# Patient Record
Sex: MALE | Race: BLACK OR AFRICAN AMERICAN | Employment: UNEMPLOYED | URBAN - METROPOLITAN AREA
[De-identification: names, ages, dates, MRNs, and addresses within clinical notes are randomized per-mention and may not be internally consistent; named-entity substitution may affect disease eponyms.]

---

## 2017-10-20 ENCOUNTER — APPOINTMENT (OUTPATIENT)
Dept: CT IMAGING | Age: 57
End: 2017-10-20
Attending: EMERGENCY MEDICINE
Payer: MEDICARE

## 2017-10-20 ENCOUNTER — HOSPITAL ENCOUNTER (EMERGENCY)
Age: 57
Discharge: HOME | End: 2017-10-20
Attending: EMERGENCY MEDICINE
Payer: MEDICARE

## 2017-10-20 ENCOUNTER — APPOINTMENT (OUTPATIENT)
Dept: GENERAL RADIOLOGY | Age: 57
End: 2017-10-20
Attending: EMERGENCY MEDICINE
Payer: MEDICARE

## 2017-10-20 VITALS
HEIGHT: 71 IN | RESPIRATION RATE: 17 BRPM | SYSTOLIC BLOOD PRESSURE: 168 MMHG | DIASTOLIC BLOOD PRESSURE: 98 MMHG | TEMPERATURE: 98 F | OXYGEN SATURATION: 100 % | WEIGHT: 181 LBS | BODY MASS INDEX: 25.34 KG/M2 | HEART RATE: 71 BPM

## 2017-10-20 DIAGNOSIS — R07.81 PLEURITIC CHEST PAIN: Primary | ICD-10-CM

## 2017-10-20 LAB
ALBUMIN SERPL-MCNC: 3 G/DL (ref 3.4–5)
ALBUMIN/GLOB SERPL: 0.9 {RATIO} (ref 0.8–1.7)
ALP SERPL-CCNC: 81 U/L (ref 45–117)
ALT SERPL-CCNC: 60 U/L (ref 16–61)
ANION GAP SERPL CALC-SCNC: 5 MMOL/L (ref 3–18)
AST SERPL-CCNC: 35 U/L (ref 15–37)
BASOPHILS # BLD: 0 K/UL (ref 0–0.1)
BASOPHILS NFR BLD: 0 % (ref 0–3)
BILIRUB SERPL-MCNC: 0.4 MG/DL (ref 0.2–1)
BUN SERPL-MCNC: 14 MG/DL (ref 7–18)
BUN/CREAT SERPL: 12 (ref 12–20)
CALCIUM SERPL-MCNC: 8.6 MG/DL (ref 8.5–10.1)
CHLORIDE SERPL-SCNC: 105 MMOL/L (ref 100–108)
CK MB CFR SERPL CALC: 0.6 % (ref 0–4)
CK MB SERPL-MCNC: 1 NG/ML (ref 5–25)
CK SERPL-CCNC: 160 U/L (ref 39–308)
CO2 SERPL-SCNC: 31 MMOL/L (ref 21–32)
CREAT SERPL-MCNC: 1.2 MG/DL (ref 0.6–1.3)
D DIMER PPP FEU-MCNC: 0.69 UG/ML(FEU)
DIFFERENTIAL METHOD BLD: ABNORMAL
EOSINOPHIL # BLD: 0.3 K/UL (ref 0–0.4)
EOSINOPHIL NFR BLD: 4 % (ref 0–5)
ERYTHROCYTE [DISTWIDTH] IN BLOOD BY AUTOMATED COUNT: 15.8 % (ref 11.6–14.5)
GLOBULIN SER CALC-MCNC: 3.2 G/DL (ref 2–4)
GLUCOSE SERPL-MCNC: 103 MG/DL (ref 74–99)
HCT VFR BLD AUTO: 38.4 % (ref 36–48)
HGB BLD-MCNC: 12.8 G/DL (ref 13–16)
INR PPP: 1 (ref 0.8–1.2)
LIPASE SERPL-CCNC: 108 U/L (ref 73–393)
LYMPHOCYTES # BLD: 3.5 K/UL (ref 0.8–3.5)
LYMPHOCYTES NFR BLD: 45 % (ref 20–51)
MAGNESIUM SERPL-MCNC: 1.9 MG/DL (ref 1.6–2.6)
MCH RBC QN AUTO: 24.7 PG (ref 24–34)
MCHC RBC AUTO-ENTMCNC: 33.3 G/DL (ref 31–37)
MCV RBC AUTO: 74.1 FL (ref 74–97)
MONOCYTES # BLD: 0.6 K/UL (ref 0–1)
MONOCYTES NFR BLD: 8 % (ref 2–9)
NEUTS SEG # BLD: 3.3 K/UL (ref 1.8–8)
NEUTS SEG NFR BLD: 43 % (ref 42–75)
PLATELET # BLD AUTO: 201 K/UL (ref 135–420)
PMV BLD AUTO: 10.4 FL (ref 9.2–11.8)
POTASSIUM SERPL-SCNC: 4.1 MMOL/L (ref 3.5–5.5)
PROT SERPL-MCNC: 6.2 G/DL (ref 6.4–8.2)
PROTHROMBIN TIME: 12.4 SEC (ref 11.5–15.2)
RBC # BLD AUTO: 5.18 M/UL (ref 4.7–5.5)
RBC MORPH BLD: ABNORMAL
SODIUM SERPL-SCNC: 141 MMOL/L (ref 136–145)
TROPONIN I SERPL-MCNC: <0.02 NG/ML (ref 0–0.06)
WBC # BLD AUTO: 7.7 K/UL (ref 4.6–13.2)
WBC MORPH BLD: ABNORMAL

## 2017-10-20 PROCEDURE — 80053 COMPREHEN METABOLIC PANEL: CPT | Performed by: EMERGENCY MEDICINE

## 2017-10-20 PROCEDURE — 83735 ASSAY OF MAGNESIUM: CPT | Performed by: EMERGENCY MEDICINE

## 2017-10-20 PROCEDURE — 71010 XR CHEST SNGL V: CPT

## 2017-10-20 PROCEDURE — 93005 ELECTROCARDIOGRAM TRACING: CPT

## 2017-10-20 PROCEDURE — 85025 COMPLETE CBC W/AUTO DIFF WBC: CPT | Performed by: EMERGENCY MEDICINE

## 2017-10-20 PROCEDURE — 85610 PROTHROMBIN TIME: CPT | Performed by: EMERGENCY MEDICINE

## 2017-10-20 PROCEDURE — 99285 EMERGENCY DEPT VISIT HI MDM: CPT

## 2017-10-20 PROCEDURE — 93971 EXTREMITY STUDY: CPT

## 2017-10-20 PROCEDURE — 74011250636 HC RX REV CODE- 250/636: Performed by: EMERGENCY MEDICINE

## 2017-10-20 PROCEDURE — 71275 CT ANGIOGRAPHY CHEST: CPT

## 2017-10-20 PROCEDURE — 94762 N-INVAS EAR/PLS OXIMTRY CONT: CPT

## 2017-10-20 PROCEDURE — 85379 FIBRIN DEGRADATION QUANT: CPT | Performed by: EMERGENCY MEDICINE

## 2017-10-20 PROCEDURE — 83690 ASSAY OF LIPASE: CPT | Performed by: EMERGENCY MEDICINE

## 2017-10-20 PROCEDURE — 82550 ASSAY OF CK (CPK): CPT | Performed by: EMERGENCY MEDICINE

## 2017-10-20 PROCEDURE — 74011636320 HC RX REV CODE- 636/320: Performed by: EMERGENCY MEDICINE

## 2017-10-20 PROCEDURE — 96374 THER/PROPH/DIAG INJ IV PUSH: CPT

## 2017-10-20 RX ORDER — HYDROCODONE BITARTRATE AND ACETAMINOPHEN 5; 325 MG/1; MG/1
TABLET ORAL
Qty: 12 TAB | Refills: 0 | Status: SHIPPED | OUTPATIENT
Start: 2017-10-20 | End: 2018-05-29

## 2017-10-20 RX ORDER — NITROGLYCERIN 0.4 MG/1
0.4 TABLET SUBLINGUAL
COMMUNITY

## 2017-10-20 RX ORDER — LISINOPRIL 5 MG/1
5 TABLET ORAL DAILY
COMMUNITY

## 2017-10-20 RX ORDER — ASPIRIN 81 MG/1
81 TABLET ORAL DAILY
COMMUNITY
End: 2018-05-29

## 2017-10-20 RX ORDER — MORPHINE SULFATE 2 MG/ML
4 INJECTION, SOLUTION INTRAMUSCULAR; INTRAVENOUS
Status: COMPLETED | OUTPATIENT
Start: 2017-10-20 | End: 2017-10-20

## 2017-10-20 RX ORDER — RANITIDINE 300 MG/1
300 TABLET ORAL DAILY
COMMUNITY

## 2017-10-20 RX ORDER — CLOPIDOGREL BISULFATE 75 MG/1
75 TABLET ORAL
COMMUNITY

## 2017-10-20 RX ORDER — ATORVASTATIN CALCIUM 20 MG/1
20 TABLET, FILM COATED ORAL
COMMUNITY

## 2017-10-20 RX ORDER — OXYCODONE AND ACETAMINOPHEN 5; 325 MG/1; MG/1
1 TABLET ORAL
COMMUNITY
End: 2017-10-20

## 2017-10-20 RX ADMIN — IOPAMIDOL 100 ML: 755 INJECTION, SOLUTION INTRAVENOUS at 11:51

## 2017-10-20 RX ADMIN — MORPHINE SULFATE 4 MG: 2 INJECTION, SOLUTION INTRAMUSCULAR; INTRAVENOUS at 10:14

## 2017-10-20 NOTE — PROCEDURES
[facility]  *** FINAL REPORT ***    Name: Shine Alanis  MRN: [id number]    Outpatient  : 20 Mar 1960  HIS Order #: [id number]  [address] [facility] [place] #: [id number]  Date: 20 Oct 2017    TYPE OF TEST: Peripheral Venous Testing    REASON FOR TEST  Pain in limb    Right Leg:-  Deep venous thrombosis:           No  Superficial venous thrombosis:    No  Deep venous insufficiency:        Not examined  Superficial venous insufficiency: Not examined      INTERPRETATION/FINDINGS  Duplex images were obtained using 2-D gray scale, color flow, and  spectral Doppler analysis. Right leg :  1. Deep vein(s) visualized include the common femoral, deep femoral,  proximal femoral, mid femoral, distal femoral, popliteal(above knee),  popliteal(fossa), popliteal(below knee), posterior tibial and peroneal   veins. 2. No evidence of deep venous thrombosis detected in the veins  visualized. 3. No evidence of deep vein thrombosis in the contralateral common  femoral vein. 4. Superficial vein(s) visualized include the great saphenous vein. 5. No evidence of superficial thrombosis detected. ADDITIONAL COMMENTS    I have personally reviewed the data relevant to the interpretation of  this  study.     TECHNOLOGIST: Luz Lopez  Signed: 10/20/2017 11:20 AM    PHYSICIAN: Colton Bolton MD  Signed: 10/20/2017 03:05 PM

## 2017-10-20 NOTE — ED PROVIDER NOTES
[address]  EMERGENCY DEPARTMENT HISTORY AND PHYSICAL EXAM       Date: 10/20/2017   Patient Name: Roland Otoole   YOB: 1960  Medical Record Number: [id number]    History of Presenting Illness     Chief Complaint   Patient presents with    Chest Pain        History Provided By:  patient    Additional History: 9:43 AM  Roland Otoole is a 65 y.o. male who presents to the emergency department via EMS C/O CP onset last night at 5 PM worsening with deep inspiration. Right foot swelling and pain. Pt had stent placement 1 week ago after an episode of chest pain. Pt tried Plavix and 1 baby ASA prior to EMS arrival. EMS gave 324 mg ASA. Pt is a former smoker who quit last week. No PMHx of DM. PMHx of HLD, HTN, and GERD. Followed by Cardiology Dr. Yi Mckeon. Pt denies N/V and any other associated signs or sx. Primary Care Provider: Alyse Syed MD   Specialist:    Past History     Past Medical History:   Past Medical History:   Diagnosis Date    Acid reflux     High cholesterol     Hypertension         Past Surgical History:   Past Surgical History:   Procedure Laterality Date    HX BACK SURGERY      HX CORONARY STENT PLACEMENT      HX OTHER SURGICAL Right     Knee surgery        Family History:   History reviewed. No pertinent family history. Social History:   Social History   Substance Use Topics    Smoking status: Current Some Day Smoker     Years: 35.00    Smokeless tobacco: Never Used      Comment: Pt reports he stopped smoking on 10/13/17 with coronary stent placement.  Alcohol use Yes      Comment: Occasional        Allergies:   No Known Allergies     Review of Systems   Review of Systems   Cardiovascular: Positive for chest pain and leg swelling. Gastrointestinal: Positive for abdominal pain. Negative for nausea and vomiting. Musculoskeletal:        (+) Foot swelling   All other systems reviewed and are negative.       Physical Exam  Vitals: 10/20/17 1000 10/20/17 1030 10/20/17 1130 10/20/17 1238   BP: (!) 156/94 (!) 143/94 (!) 147/97 (!) 168/98   Pulse: 70 65 63 71   Resp: 22 17 19 17   Temp:       SpO2: 98% 100% 96% 100%   Weight:       Height:           Physical Exam   Nursing note and vitals reviewed. Constitutional: Well appearing, no acute distress  Head: Normocephalic, Atraumatic  Neck: Supple  Cardiovascular: Regular rate and rhythm, no murmurs, rubs, or gallops  Chest: Normal work of breathing and chest excursion bilaterally  Lungs: Clear to ausculation bilaterally  Abdomen: Soft, non tender, non distended, normoactive bowel sounds  Back: No evidence of trauma or deformity  Extremities: No evidence of trauma or deformity, Mild RLE edema. Skin: Warm and dry  Neuro: Alert and appropriate  Psychiatric: Normal mood and affect       Diagnostic Study Results     Labs -      Recent Results (from the past 12 hour(s))   EKG, 12 LEAD, INITIAL    Collection Time: 10/20/17  9:49 AM   Result Value Ref Range    Ventricular Rate 68 BPM    Atrial Rate 68 BPM    P-R Interval 154 ms    QRS Duration 84 ms    Q-T Interval 408 ms    QTC Calculation (Bezet) 433 ms    Calculated P Axis 51 degrees    Calculated T Axis 10 degrees    Diagnosis       Normal sinus rhythm  Normal ECG  No previous ECGs available     CBC WITH AUTOMATED DIFF    Collection Time: 10/20/17  9:52 AM   Result Value Ref Range    WBC 7.7 4.6 - 13.2 K/uL    RBC 5.18 4.70 - 5.50 M/uL    HGB 12.8 (L) 13.0 - 16.0 g/dL    HCT 38.4 36.0 - 48.0 %    MCV 74.1 74.0 - 97.0 FL    MCH 24.7 24.0 - 34.0 PG    MCHC 33.3 31.0 - 37.0 g/dL    RDW 15.8 (H) 11.6 - 14.5 %    PLATELET 385 220 - 226 K/uL    MPV 10.4 9.2 - 11.8 FL    NEUTROPHILS 43 42 - 75 %    LYMPHOCYTES 45 20 - 51 %    MONOCYTES 8 2 - 9 %    EOSINOPHILS 4 0 - 5 %    BASOPHILS 0 0 - 3 %    ABS. NEUTROPHILS 3.3 1.8 - 8.0 K/UL    ABS. LYMPHOCYTES 3.5 0.8 - 3.5 K/UL    ABS. MONOCYTES 0.6 0 - 1.0 K/UL    ABS. EOSINOPHILS 0.3 0.0 - 0.4 K/UL    ABS. BASOPHILS 0.0 0.0 - 0.1 K/UL    RBC COMMENTS ANISOCYTOSIS  1+        RBC COMMENTS OVALOCYTES  1+        RBC COMMENTS SPHEROCYTES  1+        RBC COMMENTS TEARDROP CELLS  1+        RBC COMMENTS RBC FRAGMENTS  1+        WBC COMMENTS ATYPICAL LYMPHOCYTES PRESENT      DF MANUAL     D DIMER    Collection Time: 10/20/17  9:52 AM   Result Value Ref Range    D DIMER 0.69 (H) <0.46 ug/ml(FEU)   PROTHROMBIN TIME + INR    Collection Time: 10/20/17  9:52 AM   Result Value Ref Range    Prothrombin time 12.4 11.5 - 15.2 sec    INR 1.0 0.8 - 1.2     METABOLIC PANEL, COMPREHENSIVE    Collection Time: 10/20/17  9:52 AM   Result Value Ref Range    Sodium 141 136 - 145 mmol/L    Potassium 4.1 3.5 - 5.5 mmol/L    Chloride 105 100 - 108 mmol/L    CO2 31 21 - 32 mmol/L    Anion gap 5 3.0 - 18 mmol/L    Glucose 103 (H) 74 - 99 mg/dL    BUN 14 7.0 - 18 MG/DL    Creatinine 1.20 0.6 - 1.3 MG/DL    BUN/Creatinine ratio 12 12 - 20      GFR est AA >60 >60 ml/min/1.73m2    GFR est non-AA >60 >60 ml/min/1.73m2    Calcium 8.6 8.5 - 10.1 MG/DL    Bilirubin, total 0.4 0.2 - 1.0 MG/DL    ALT (SGPT) 60 16 - 61 U/L    AST (SGOT) 35 15 - 37 U/L    Alk. phosphatase 81 45 - 117 U/L    Protein, total 6.2 (L) 6.4 - 8.2 g/dL    Albumin 3.0 (L) 3.4 - 5.0 g/dL    Globulin 3.2 2.0 - 4.0 g/dL    A-G Ratio 0.9 0.8 - 1.7     MAGNESIUM    Collection Time: 10/20/17  9:52 AM   Result Value Ref Range    Magnesium 1.9 1.6 - 2.6 mg/dL   LIPASE    Collection Time: 10/20/17  9:52 AM   Result Value Ref Range    Lipase 108 73 - 393 U/L   CARDIAC PANEL,(CK, CKMB & TROPONIN)    Collection Time: 10/20/17  9:52 AM   Result Value Ref Range     39 - 308 U/L    CK - MB 1.0 <3.6 ng/ml    CK-MB Index 0.6 0.0 - 4.0 %    Troponin-I, Qt. <0.02 0.00 - 0.06 NG/ML       Radiologic Studies -  The following have been ordered and reviewed:  CTA CHEST W OR W WO CONT   Final Result   Impression:      1. No CTPA evidence of acute pulmonary thromboembolism.        2.  Mild bibasilar partial atelectasis. Anomalous draining left superior  pulmonary vein. Punctate indeterminate nodule apical segment right upper lobe;  statistically likely benign but if risk factors for lung cancer such as smoking  or significant exposure present, follow-up noncontrast chest CT in 12 months is  recommended. As read by the radiologist.    XR CHEST SNGL V   Final Result   IMPRESSION:        1. No acute cardiopulmonary disease. As read by the radiologist.    Barabara Nae LOWER EXT VENOUS RIGHT   INTERPRETATION/FINDINGS  Duplex images were obtained using 2-D gray scale, color flow, and  spectral Doppler analysis. Right leg :  1. Deep vein(s) visualized include the common femoral, deep femoral,  proximal femoral, mid femoral, distal femoral, popliteal(above knee),  popliteal(fossa), popliteal(below knee), posterior tibial and peroneal   veins. 2. No evidence of deep venous thrombosis detected in the veins  visualized. 3. No evidence of deep vein thrombosis in the contralateral common  femoral vein. 4. Superficial vein(s) visualized include the great saphenous vein. 5. No evidence of superficial thrombosis detected.     ADDITIONAL COMMENTS     I have personally reviewed the data relevant to the interpretation of  this study.     TECHNOLOGIST: Gerardo Mclaughlin  Signed: 10/20/2017 11:20 AM     As read by technologist       .    Medical Decision Making   I am the first provider for this patient. I reviewed the vital signs, available nursing notes, past medical history, past surgical history, family history and social history. Vital Signs-Reviewed the patient's vital signs. Patient Vitals for the past 12 hrs:   Temp Pulse Resp BP SpO2   10/20/17 1238 - 71 17 (!) 168/98 100 %   10/20/17 1130 - 63 19 (!) 147/97 96 %   10/20/17 1030 - 65 17 (!) 143/94 100 %   10/20/17 1000 - 70 22 (!) 156/94 98 %   10/20/17 0949 98 °F (36.7 °C) 64 18 (!) 150/109 100 %       Pulse Oximetry Analysis - Normal 100% on room air.      Cardiac Monitor:   Rate: 68 bpm  Rhythm: Normal Sinus Rhythm     EKG interpretation: (Subsequent EMS)  Rhythm: NSR. Rate (approx.): 71 bpm; QRS duration at 0.090 s. EKG read by Laron Duncan MD at 9:37 AM    EKG interpretation: (Preliminary)  NSR at 68 bpm. QRS duration at 84 ms. EKG read by Laron Duncan MD at 9:51 AM    Old Medical Records: Nursing notes. Provider Notes:      Procedures:   Procedures    ED Course:  9:43 AM  Initial assessment performed. The patients presenting problems have been discussed, and they are in agreement with the care plan formulated and outlined with them. I have encouraged them to ask questions as they arise throughout their visit. Medications Given in the ED:  Medications   morphine injection 4 mg (4 mg IntraVENous Given 10/20/17 1014)   iopamidol (ISOVUE-370) 76 % injection 100 mL (100 mL IntraVENous Given 10/20/17 1151)       Discharge Note:  12:35 PM  Pt has been reexamined. Patient has no new complaints, changes, or physical findings. Care plan outlined and precautions discussed. Results were reviewed with the patient. All medications were reviewed with the patient; will d/c home with Hydrocodone Acetaminophen. All of pt's questions and concerns were addressed. Patient was instructed and agrees to follow up with Cardiology, as well as to return to the ED upon further deterioration. Patient is ready to go home. Critical Care Time:       Diagnosis   Clinical Impression:   1. Pleuritic chest pain         Discussion:  65year old male presents with pleuritic CP since last night with recent stent. Trop negative. D Dimer postive, CTA negative. Discussed with cardio, Dr. Amanda Morelos, who recommends d/c with f/u in his office. Pt understands and agrees with this plan.       Follow-up Information     Follow up With Details Comments Contact Info    Randolph Todd MD Schedule an appointment as soon as possible for a visit For Cardiology Follow Up [place] [address] [facility]  [phone]      Saul Barber MD Schedule an appointment as soon as possible for a visit For Primary Care Follow Up [address]  Bettye Woodson and Amena PETIT. [phone] [facility]      [facility] EMERGENCY DEPT Go to As needed, If symptoms worsen [address] [place] Arellano  [phone]          Discharge Medication List as of 10/20/2017 12:36 PM      CONTINUE these medications which have NOT CHANGED    Details   clopidogrel (PLAVIX) 75 mg tab Take 75 mg by mouth., Historical Med      aspirin delayed-release 81 mg tablet Take 81 mg by mouth daily. , Historical Med      atorvastatin (LIPITOR) 20 mg tablet Take  by mouth daily. , Historical Med      lisinopril (PRINIVIL, ZESTRIL) 5 mg tablet Take 5 mg by mouth daily. , Historical Med      nitroglycerin (NITROSTAT) 0.4 mg SL tablet 0.4 mg by SubLINGual route every five (5) minutes as needed for Chest Pain., Historical Med      raNITIdine (ZANTAC) 300 mg tablet Take 300 mg by mouth daily. , Historical Med      oxyCODONE-acetaminophen (PERCOCET) 5-325 mg per tablet Take 1 Tab by mouth every four (4) hours as needed for Pain., Historical Med             _______________________________   Attestations: This note is prepared by Gerhard Kang, acting as a Scribe for Agusto Pritchett MD on 9:51 AM on 10/20/2017. Agusto Pritchett MD: The scribe's documentation has been prepared under my direction and personally reviewed by me in its entirety.   _______________________________

## 2017-10-20 NOTE — ED NOTES
Pt medicated for pain. Wife remains at bedside. Pt calm and cooperative. CCM showing NSR. Call light within reach. Warm blankets provided.

## 2017-10-20 NOTE — DISCHARGE INSTRUCTIONS
Chest Pain: Care Instructions  Your Care Instructions  There are many things that can cause chest pain. Some are not serious and will get better on their own in a few days. But some kinds of chest pain need more testing and treatment. Your doctor may have recommended a follow-up visit in the next 8 to 12 hours. If you are not getting better, you may need more tests or treatment. Even though your doctor has released you, you still need to watch for any problems. The doctor carefully checked you, but sometimes problems can develop later. If you have new symptoms or if your symptoms do not get better, get medical care right away. If you have worse or different chest pain or pressure that lasts more than 5 minutes or you passed out (lost consciousness), call 911 or seek other emergency help right away. A medical visit is only one step in your treatment. Even if you feel better, you still need to do what your doctor recommends, such as going to all suggested follow-up appointments and taking medicines exactly as directed. This will help you recover and help prevent future problems. How can you care for yourself at home? · Rest until you feel better. · Take your medicine exactly as prescribed. Call your doctor if you think you are having a problem with your medicine. · Do not drive after taking a prescription pain medicine. When should you call for help? Call 911 if:  · You passed out (lost consciousness). · You have severe difficulty breathing. · You have symptoms of a heart attack. These may include:  ¨ Chest pain or pressure, or a strange feeling in your chest.  ¨ Sweating. ¨ Shortness of breath. ¨ Nausea or vomiting. ¨ Pain, pressure, or a strange feeling in your back, neck, jaw, or upper belly or in one or both shoulders or arms. ¨ Lightheadedness or sudden weakness. ¨ A fast or irregular heartbeat.   After you call 911, the  may tell you to chew 1 adult-strength or 2 to 4 low-dose aspirin. Wait for an ambulance. Do not try to drive yourself. Call your doctor today if:  · You have any trouble breathing. · Your chest pain gets worse. · You are dizzy or lightheaded, or you feel like you may faint. · You are not getting better as expected. · You are having new or different chest pain. Where can you learn more? Go to http://nathaly-meghana.info/. Enter A120 in the search box to learn more about \"Chest Pain: Care Instructions. \"  Current as of: March 20, 2017  Content Version: 11.3  © 7899-3329 [employer]. Care instructions adapted under license by [employer] (which disclaims liability or warranty for this information). If you have questions about a medical condition or this instruction, always ask your healthcare professional. [address] any warranty or liability for your use of this information.

## 2017-10-20 NOTE — ED TRIAGE NOTES
Pt brought by EMS for chest pain, 9/10, onset 10/19/17 at 1700. Pt reports he had a stent placed at [facility] on Friday. Pt reports the chest pain increases with deep breathing. Sepsis Screening completed    (  )Patient meets SIRS criteria. (X) Patient does not meet SIRS criteria.       SIRS Criteria is achieved when two or more of the following are present   Temperature < 96.8°F (36°C) or > 100.9°F (38.3°C)   Heart Rate > 90 beats per minute   Respiratory Rate > 20 breaths per minute   WBC count > 12,000 or <4,000 or > 10% bands

## 2017-10-26 LAB
ATRIAL RATE: 68 BPM
CALCULATED P AXIS, ECG09: 51 DEGREES
CALCULATED T AXIS, ECG11: 10 DEGREES
DIAGNOSIS, 93000: NORMAL
P-R INTERVAL, ECG05: 154 MS
Q-T INTERVAL, ECG07: 408 MS
QRS DURATION, ECG06: 84 MS
QTC CALCULATION (BEZET), ECG08: 433 MS
VENTRICULAR RATE, ECG03: 68 BPM

## 2018-05-29 ENCOUNTER — HOSPITAL ENCOUNTER (OUTPATIENT)
Dept: PREADMISSION TESTING | Age: 58
Discharge: HOME | End: 2018-05-29
Payer: MEDICARE

## 2018-05-29 VITALS — HEIGHT: 71 IN | BODY MASS INDEX: 30.8 KG/M2 | WEIGHT: 220 LBS

## 2018-05-29 LAB
ALBUMIN SERPL-MCNC: 3.6 G/DL (ref 3.4–5)
ALBUMIN/GLOB SERPL: 1.1 {RATIO} (ref 0.8–1.7)
ALP SERPL-CCNC: 101 U/L (ref 45–117)
ALT SERPL-CCNC: 38 U/L (ref 16–61)
ANION GAP SERPL CALC-SCNC: 8 MMOL/L (ref 3–18)
APPEARANCE UR: CLEAR
AST SERPL-CCNC: 20 U/L (ref 15–37)
BACTERIA SPEC CULT: NORMAL
BILIRUB SERPL-MCNC: 0.5 MG/DL (ref 0.2–1)
BILIRUB UR QL: NEGATIVE
BUN SERPL-MCNC: 14 MG/DL (ref 7–18)
BUN/CREAT SERPL: 13 (ref 12–20)
CALCIUM SERPL-MCNC: 8.8 MG/DL (ref 8.5–10.1)
CHLORIDE SERPL-SCNC: 105 MMOL/L (ref 100–108)
CO2 SERPL-SCNC: 28 MMOL/L (ref 21–32)
COLOR UR: YELLOW
CREAT SERPL-MCNC: 1.1 MG/DL (ref 0.6–1.3)
ERYTHROCYTE [DISTWIDTH] IN BLOOD BY AUTOMATED COUNT: 16.5 % (ref 11.6–14.5)
GLOBULIN SER CALC-MCNC: 3.4 G/DL (ref 2–4)
GLUCOSE SERPL-MCNC: 86 MG/DL (ref 74–99)
GLUCOSE UR STRIP.AUTO-MCNC: NEGATIVE MG/DL
HCT VFR BLD AUTO: 39.6 % (ref 36–48)
HGB BLD-MCNC: 12.5 G/DL (ref 13–16)
HGB UR QL STRIP: NEGATIVE
KETONES UR QL STRIP.AUTO: NEGATIVE MG/DL
LEUKOCYTE ESTERASE UR QL STRIP.AUTO: NEGATIVE
MCH RBC QN AUTO: 23.9 PG (ref 24–34)
MCHC RBC AUTO-ENTMCNC: 31.6 G/DL (ref 31–37)
MCV RBC AUTO: 75.9 FL (ref 74–97)
NITRITE UR QL STRIP.AUTO: NEGATIVE
PH UR STRIP: 5 [PH] (ref 5–8)
PLATELET # BLD AUTO: 216 K/UL (ref 135–420)
POTASSIUM SERPL-SCNC: 4.2 MMOL/L (ref 3.5–5.5)
PROT SERPL-MCNC: 7 G/DL (ref 6.4–8.2)
PROT UR STRIP-MCNC: NEGATIVE MG/DL
RBC # BLD AUTO: 5.22 M/UL (ref 4.7–5.5)
SERVICE CMNT-IMP: NORMAL
SODIUM SERPL-SCNC: 141 MMOL/L (ref 136–145)
SP GR UR REFRACTOMETRY: 1.02 (ref 1–1.03)
UROBILINOGEN UR QL STRIP.AUTO: 0.2 EU/DL (ref 0.2–1)
WBC # BLD AUTO: 6.2 K/UL (ref 4.6–13.2)

## 2018-05-29 PROCEDURE — 80053 COMPREHEN METABOLIC PANEL: CPT | Performed by: ORTHOPAEDIC SURGERY

## 2018-05-29 PROCEDURE — 87086 URINE CULTURE/COLONY COUNT: CPT | Performed by: ORTHOPAEDIC SURGERY

## 2018-05-29 PROCEDURE — 85027 COMPLETE CBC AUTOMATED: CPT | Performed by: ORTHOPAEDIC SURGERY

## 2018-05-29 PROCEDURE — 81003 URINALYSIS AUTO W/O SCOPE: CPT | Performed by: ORTHOPAEDIC SURGERY

## 2018-05-29 PROCEDURE — 87641 MR-STAPH DNA AMP PROBE: CPT | Performed by: ORTHOPAEDIC SURGERY

## 2018-05-29 PROCEDURE — 36415 COLL VENOUS BLD VENIPUNCTURE: CPT | Performed by: ORTHOPAEDIC SURGERY

## 2018-05-29 RX ORDER — CEFAZOLIN SODIUM/WATER 2 G/20 ML
2 SYRINGE (ML) INTRAVENOUS ONCE
Status: DISCONTINUED | OUTPATIENT
Start: 2018-05-29 | End: 2018-05-29 | Stop reason: SDUPTHER

## 2018-05-29 RX ORDER — CALCIUM POLYCARBOPHIL 625 MG
625 TABLET ORAL DAILY
COMMUNITY

## 2018-05-29 RX ORDER — ACETAMINOPHEN 325 MG/1
TABLET ORAL
Status: ON HOLD | COMMUNITY
End: 2018-07-27

## 2018-05-29 RX ORDER — CLONAZEPAM 0.5 MG/1
0.5 TABLET ORAL
COMMUNITY

## 2018-05-29 RX ORDER — CEFAZOLIN SODIUM/WATER 2 G/20 ML
2 SYRINGE (ML) INTRAVENOUS ONCE
Status: CANCELLED | OUTPATIENT
Start: 2018-05-29 | End: 2018-05-29

## 2018-05-29 RX ORDER — SERTRALINE HYDROCHLORIDE 25 MG/1
25 TABLET, FILM COATED ORAL DAILY
Status: ON HOLD | COMMUNITY
End: 2018-07-02

## 2018-05-29 RX ORDER — SODIUM CHLORIDE, SODIUM LACTATE, POTASSIUM CHLORIDE, CALCIUM CHLORIDE 600; 310; 30; 20 MG/100ML; MG/100ML; MG/100ML; MG/100ML
125 INJECTION, SOLUTION INTRAVENOUS CONTINUOUS
Status: DISCONTINUED | OUTPATIENT
Start: 2018-05-29 | End: 2018-05-29 | Stop reason: SDUPTHER

## 2018-05-29 RX ORDER — SODIUM CHLORIDE, SODIUM LACTATE, POTASSIUM CHLORIDE, CALCIUM CHLORIDE 600; 310; 30; 20 MG/100ML; MG/100ML; MG/100ML; MG/100ML
125 INJECTION, SOLUTION INTRAVENOUS CONTINUOUS
Status: CANCELLED | OUTPATIENT
Start: 2018-05-29

## 2018-05-31 LAB
BACTERIA SPEC CULT: NORMAL
SERVICE CMNT-IMP: NORMAL

## 2018-06-25 ENCOUNTER — HOSPITAL ENCOUNTER (INPATIENT)
Age: 58
LOS: 2 days | Discharge: HOME HEALTH | DRG: 470 | End: 2018-06-27
Attending: ORTHOPAEDIC SURGERY | Admitting: ORTHOPAEDIC SURGERY
Payer: MEDICARE

## 2018-06-25 ENCOUNTER — ANESTHESIA (OUTPATIENT)
Dept: SURGERY | Age: 58
DRG: 470 | End: 2018-06-25
Payer: MEDICARE

## 2018-06-25 ENCOUNTER — ANESTHESIA EVENT (OUTPATIENT)
Dept: SURGERY | Age: 58
DRG: 470 | End: 2018-06-25
Payer: MEDICARE

## 2018-06-25 DIAGNOSIS — M17.0 PRIMARY OSTEOARTHRITIS OF BOTH KNEES: Primary | ICD-10-CM

## 2018-06-25 PROBLEM — M17.9 DJD (DEGENERATIVE JOINT DISEASE) OF KNEE: Status: ACTIVE | Noted: 2018-06-25

## 2018-06-25 LAB
ABO + RH BLD: NORMAL
ATRIAL RATE: 67 BPM
BLOOD GROUP ANTIBODIES SERPL: NORMAL
CALCULATED P AXIS, ECG09: 43 DEGREES
CALCULATED R AXIS, ECG10: -8 DEGREES
CALCULATED T AXIS, ECG11: 5 DEGREES
DIAGNOSIS, 93000: NORMAL
P-R INTERVAL, ECG05: 170 MS
Q-T INTERVAL, ECG07: 402 MS
QRS DURATION, ECG06: 84 MS
QTC CALCULATION (BEZET), ECG08: 424 MS
SPECIMEN EXP DATE BLD: NORMAL
VENTRICULAR RATE, ECG03: 67 BPM

## 2018-06-25 PROCEDURE — 74011250636 HC RX REV CODE- 250/636: Performed by: ORTHOPAEDIC SURGERY

## 2018-06-25 PROCEDURE — 77030005521 HC CATH URETH FOL38 BARD -B: Performed by: ORTHOPAEDIC SURGERY

## 2018-06-25 PROCEDURE — 97161 PT EVAL LOW COMPLEX 20 MIN: CPT

## 2018-06-25 PROCEDURE — 77030012508 HC MSK AIRWY LMA AMBU -A: Performed by: ANESTHESIOLOGY

## 2018-06-25 PROCEDURE — 77010033678 HC OXYGEN DAILY

## 2018-06-25 PROCEDURE — 76060000034 HC ANESTHESIA 1.5 TO 2 HR: Performed by: ORTHOPAEDIC SURGERY

## 2018-06-25 PROCEDURE — 77030020813 HC INST SCULP CEM KT DISP S&N -B: Performed by: ORTHOPAEDIC SURGERY

## 2018-06-25 PROCEDURE — C1713 ANCHOR/SCREW BN/BN,TIS/BN: HCPCS | Performed by: ORTHOPAEDIC SURGERY

## 2018-06-25 PROCEDURE — 77030011640 HC PAD GRND REM COVD -A: Performed by: ORTHOPAEDIC SURGERY

## 2018-06-25 PROCEDURE — 77030012893

## 2018-06-25 PROCEDURE — C9290 INJ, BUPIVACAINE LIPOSOME: HCPCS | Performed by: ORTHOPAEDIC SURGERY

## 2018-06-25 PROCEDURE — 77030039267 HC ADH SKN EXOFIN S2SG -B: Performed by: ORTHOPAEDIC SURGERY

## 2018-06-25 PROCEDURE — 77030002966 HC SUT PDS J&J -A: Performed by: ORTHOPAEDIC SURGERY

## 2018-06-25 PROCEDURE — 74011250636 HC RX REV CODE- 250/636

## 2018-06-25 PROCEDURE — 77030027138 HC INCENT SPIROMETER -A

## 2018-06-25 PROCEDURE — 77030011628: Performed by: ORTHOPAEDIC SURGERY

## 2018-06-25 PROCEDURE — 77030020754 HC CUF TRNQT 2BLA STRY -B: Performed by: ORTHOPAEDIC SURGERY

## 2018-06-25 PROCEDURE — 77030032489 HC SLV COMPR SCD FT CUF COVD -B: Performed by: ORTHOPAEDIC SURGERY

## 2018-06-25 PROCEDURE — 77030020259 HC SOL INJ SOD CL 0.9% 100ML BG: Performed by: ORTHOPAEDIC SURGERY

## 2018-06-25 PROCEDURE — 74011000250 HC RX REV CODE- 250: Performed by: ORTHOPAEDIC SURGERY

## 2018-06-25 PROCEDURE — 93005 ELECTROCARDIOGRAM TRACING: CPT

## 2018-06-25 PROCEDURE — 77030020782 HC GWN BAIR PAWS FLX 3M -B: Performed by: ORTHOPAEDIC SURGERY

## 2018-06-25 PROCEDURE — 36415 COLL VENOUS BLD VENIPUNCTURE: CPT | Performed by: ORTHOPAEDIC SURGERY

## 2018-06-25 PROCEDURE — 76210000006 HC OR PH I REC 0.5 TO 1 HR: Performed by: ORTHOPAEDIC SURGERY

## 2018-06-25 PROCEDURE — 74011258636 HC RX REV CODE- 258/636: Performed by: ORTHOPAEDIC SURGERY

## 2018-06-25 PROCEDURE — 77030038020 HC MANFLD NEPTUNE STRY -B: Performed by: ORTHOPAEDIC SURGERY

## 2018-06-25 PROCEDURE — 77030031118: Performed by: ORTHOPAEDIC SURGERY

## 2018-06-25 PROCEDURE — 76010000153 HC OR TIME 1.5 TO 2 HR: Performed by: ORTHOPAEDIC SURGERY

## 2018-06-25 PROCEDURE — 77030022295 HC SEAL BPLR VSL DISP MEDT -F: Performed by: ORTHOPAEDIC SURGERY

## 2018-06-25 PROCEDURE — 0SRC0J9 REPLACEMENT OF RIGHT KNEE JOINT WITH SYNTHETIC SUBSTITUTE, CEMENTED, OPEN APPROACH: ICD-10-PCS | Performed by: ORTHOPAEDIC SURGERY

## 2018-06-25 PROCEDURE — 77030013708 HC HNDPC SUC IRR PULS STRY –B: Performed by: ORTHOPAEDIC SURGERY

## 2018-06-25 PROCEDURE — 74011000258 HC RX REV CODE- 258: Performed by: ORTHOPAEDIC SURGERY

## 2018-06-25 PROCEDURE — 77030038010: Performed by: ORTHOPAEDIC SURGERY

## 2018-06-25 PROCEDURE — 74011000250 HC RX REV CODE- 250

## 2018-06-25 PROCEDURE — C1776 JOINT DEVICE (IMPLANTABLE): HCPCS | Performed by: ORTHOPAEDIC SURGERY

## 2018-06-25 PROCEDURE — 77030003028 HC SUT VCRL J&J -A: Performed by: ORTHOPAEDIC SURGERY

## 2018-06-25 PROCEDURE — 97116 GAIT TRAINING THERAPY: CPT

## 2018-06-25 PROCEDURE — 74011250636 HC RX REV CODE- 250/636: Performed by: ANESTHESIOLOGY

## 2018-06-25 PROCEDURE — 74011250637 HC RX REV CODE- 250/637: Performed by: ORTHOPAEDIC SURGERY

## 2018-06-25 PROCEDURE — 77030012893: Performed by: ORTHOPAEDIC SURGERY

## 2018-06-25 PROCEDURE — 65270000029 HC RM PRIVATE

## 2018-06-25 PROCEDURE — 86900 BLOOD TYPING SEROLOGIC ABO: CPT | Performed by: ORTHOPAEDIC SURGERY

## 2018-06-25 PROCEDURE — 77030036563 HC WRP CLD THER KNE S2SG -B: Performed by: ORTHOPAEDIC SURGERY

## 2018-06-25 DEVICE — CEMENT BNE 20GM HALF DOSE PMMA VISC RADPQ FAST: Type: IMPLANTABLE DEVICE | Site: KNEE | Status: FUNCTIONAL

## 2018-06-25 DEVICE — CEMENT BNE 40GM FULL DOSE PMMA W/O ANTIBIO HI VISC N RADPQ: Type: IMPLANTABLE DEVICE | Site: KNEE | Status: FUNCTIONAL

## 2018-06-25 DEVICE — IMPLANTABLE DEVICE: Type: IMPLANTABLE DEVICE | Site: KNEE | Status: FUNCTIONAL

## 2018-06-25 RX ORDER — DIPHENHYDRAMINE HYDROCHLORIDE 50 MG/ML
12.5 INJECTION, SOLUTION INTRAMUSCULAR; INTRAVENOUS
Status: DISCONTINUED | OUTPATIENT
Start: 2018-06-25 | End: 2018-06-27 | Stop reason: HOSPADM

## 2018-06-25 RX ORDER — TRAMADOL HYDROCHLORIDE 50 MG/1
100 TABLET ORAL 4 TIMES DAILY
Status: DISCONTINUED | OUTPATIENT
Start: 2018-06-25 | End: 2018-06-27 | Stop reason: HOSPADM

## 2018-06-25 RX ORDER — ZOLPIDEM TARTRATE 5 MG/1
5 TABLET ORAL
Status: DISCONTINUED | OUTPATIENT
Start: 2018-06-25 | End: 2018-06-27 | Stop reason: HOSPADM

## 2018-06-25 RX ORDER — NITROGLYCERIN 0.4 MG/1
0.4 TABLET SUBLINGUAL
Status: DISCONTINUED | OUTPATIENT
Start: 2018-06-25 | End: 2018-06-27 | Stop reason: HOSPADM

## 2018-06-25 RX ORDER — ATORVASTATIN CALCIUM 20 MG/1
20 TABLET, FILM COATED ORAL
Status: DISCONTINUED | OUTPATIENT
Start: 2018-06-25 | End: 2018-06-27 | Stop reason: HOSPADM

## 2018-06-25 RX ORDER — FENTANYL CITRATE 50 UG/ML
25 INJECTION, SOLUTION INTRAMUSCULAR; INTRAVENOUS
Status: ACTIVE | OUTPATIENT
Start: 2018-06-25 | End: 2018-06-25

## 2018-06-25 RX ORDER — SODIUM CHLORIDE 0.9 % (FLUSH) 0.9 %
5-10 SYRINGE (ML) INJECTION EVERY 8 HOURS
Status: DISCONTINUED | OUTPATIENT
Start: 2018-06-25 | End: 2018-06-27 | Stop reason: HOSPADM

## 2018-06-25 RX ORDER — ONDANSETRON 2 MG/ML
INJECTION INTRAMUSCULAR; INTRAVENOUS AS NEEDED
Status: DISCONTINUED | OUTPATIENT
Start: 2018-06-25 | End: 2018-06-25 | Stop reason: HOSPADM

## 2018-06-25 RX ORDER — ENOXAPARIN SODIUM 100 MG/ML
40 INJECTION SUBCUTANEOUS EVERY 24 HOURS
Status: DISCONTINUED | OUTPATIENT
Start: 2018-06-26 | End: 2018-06-27 | Stop reason: HOSPADM

## 2018-06-25 RX ORDER — LISINOPRIL 5 MG/1
5 TABLET ORAL DAILY
Status: DISCONTINUED | OUTPATIENT
Start: 2018-06-26 | End: 2018-06-27 | Stop reason: HOSPADM

## 2018-06-25 RX ORDER — INSULIN LISPRO 100 [IU]/ML
INJECTION, SOLUTION INTRAVENOUS; SUBCUTANEOUS ONCE
Status: DISCONTINUED | OUTPATIENT
Start: 2018-06-25 | End: 2018-06-25 | Stop reason: HOSPADM

## 2018-06-25 RX ORDER — EPHEDRINE SULFATE/0.9% NACL/PF 25 MG/5 ML
SYRINGE (ML) INTRAVENOUS AS NEEDED
Status: DISCONTINUED | OUTPATIENT
Start: 2018-06-25 | End: 2018-06-25 | Stop reason: HOSPADM

## 2018-06-25 RX ORDER — LIDOCAINE HYDROCHLORIDE 20 MG/ML
INJECTION, SOLUTION EPIDURAL; INFILTRATION; INTRACAUDAL; PERINEURAL AS NEEDED
Status: DISCONTINUED | OUTPATIENT
Start: 2018-06-25 | End: 2018-06-25 | Stop reason: HOSPADM

## 2018-06-25 RX ORDER — ONDANSETRON 2 MG/ML
4 INJECTION INTRAMUSCULAR; INTRAVENOUS ONCE
Status: DISCONTINUED | OUTPATIENT
Start: 2018-06-25 | End: 2018-06-25 | Stop reason: HOSPADM

## 2018-06-25 RX ORDER — CLONAZEPAM 0.5 MG/1
0.5 TABLET ORAL
Status: DISCONTINUED | OUTPATIENT
Start: 2018-06-25 | End: 2018-06-27 | Stop reason: HOSPADM

## 2018-06-25 RX ORDER — KETOROLAC TROMETHAMINE 15 MG/ML
15 INJECTION, SOLUTION INTRAMUSCULAR; INTRAVENOUS EVERY 6 HOURS
Status: COMPLETED | OUTPATIENT
Start: 2018-06-25 | End: 2018-06-26

## 2018-06-25 RX ORDER — CLOPIDOGREL BISULFATE 75 MG/1
75 TABLET ORAL DAILY
Status: DISCONTINUED | OUTPATIENT
Start: 2018-06-26 | End: 2018-06-27 | Stop reason: HOSPADM

## 2018-06-25 RX ORDER — SERTRALINE HYDROCHLORIDE 50 MG/1
25 TABLET, FILM COATED ORAL DAILY
Status: DISCONTINUED | OUTPATIENT
Start: 2018-06-26 | End: 2018-06-27 | Stop reason: HOSPADM

## 2018-06-25 RX ORDER — HYDROMORPHONE HYDROCHLORIDE 1 MG/ML
1 INJECTION, SOLUTION INTRAMUSCULAR; INTRAVENOUS; SUBCUTANEOUS
Status: DISCONTINUED | OUTPATIENT
Start: 2018-06-25 | End: 2018-06-27 | Stop reason: HOSPADM

## 2018-06-25 RX ORDER — NALOXONE HYDROCHLORIDE 0.4 MG/ML
0.1 INJECTION, SOLUTION INTRAMUSCULAR; INTRAVENOUS; SUBCUTANEOUS
Status: DISCONTINUED | OUTPATIENT
Start: 2018-06-25 | End: 2018-06-25 | Stop reason: HOSPADM

## 2018-06-25 RX ORDER — FENTANYL CITRATE 50 UG/ML
INJECTION, SOLUTION INTRAMUSCULAR; INTRAVENOUS AS NEEDED
Status: DISCONTINUED | OUTPATIENT
Start: 2018-06-25 | End: 2018-06-25 | Stop reason: HOSPADM

## 2018-06-25 RX ORDER — MIDAZOLAM HYDROCHLORIDE 1 MG/ML
INJECTION, SOLUTION INTRAMUSCULAR; INTRAVENOUS AS NEEDED
Status: DISCONTINUED | OUTPATIENT
Start: 2018-06-25 | End: 2018-06-25 | Stop reason: HOSPADM

## 2018-06-25 RX ORDER — CEFAZOLIN SODIUM/WATER 2 G/20 ML
2 SYRINGE (ML) INTRAVENOUS EVERY 8 HOURS
Status: COMPLETED | OUTPATIENT
Start: 2018-06-25 | End: 2018-06-26

## 2018-06-25 RX ORDER — OXYCODONE AND ACETAMINOPHEN 5; 325 MG/1; MG/1
1 TABLET ORAL AS NEEDED
Status: DISCONTINUED | OUTPATIENT
Start: 2018-06-25 | End: 2018-06-25 | Stop reason: HOSPADM

## 2018-06-25 RX ORDER — MAGNESIUM SULFATE 100 %
4 CRYSTALS MISCELLANEOUS AS NEEDED
Status: DISCONTINUED | OUTPATIENT
Start: 2018-06-25 | End: 2018-06-25 | Stop reason: HOSPADM

## 2018-06-25 RX ORDER — SODIUM CHLORIDE, SODIUM LACTATE, POTASSIUM CHLORIDE, CALCIUM CHLORIDE 600; 310; 30; 20 MG/100ML; MG/100ML; MG/100ML; MG/100ML
50 INJECTION, SOLUTION INTRAVENOUS CONTINUOUS
Status: DISCONTINUED | OUTPATIENT
Start: 2018-06-25 | End: 2018-06-25 | Stop reason: HOSPADM

## 2018-06-25 RX ORDER — CEFAZOLIN SODIUM/WATER 2 G/20 ML
2 SYRINGE (ML) INTRAVENOUS ONCE
Status: COMPLETED | OUTPATIENT
Start: 2018-06-25 | End: 2018-06-25

## 2018-06-25 RX ORDER — DOCUSATE SODIUM 100 MG/1
100 CAPSULE, LIQUID FILLED ORAL 2 TIMES DAILY
Status: DISCONTINUED | OUTPATIENT
Start: 2018-06-25 | End: 2018-06-27 | Stop reason: HOSPADM

## 2018-06-25 RX ORDER — ACETAMINOPHEN 325 MG/1
650 TABLET ORAL
Status: DISCONTINUED | OUTPATIENT
Start: 2018-06-25 | End: 2018-06-27 | Stop reason: HOSPADM

## 2018-06-25 RX ORDER — METOCLOPRAMIDE HYDROCHLORIDE 5 MG/ML
10 INJECTION INTRAMUSCULAR; INTRAVENOUS
Status: DISCONTINUED | OUTPATIENT
Start: 2018-06-25 | End: 2018-06-27 | Stop reason: HOSPADM

## 2018-06-25 RX ORDER — HYDROMORPHONE HYDROCHLORIDE 2 MG/ML
0.5 INJECTION, SOLUTION INTRAMUSCULAR; INTRAVENOUS; SUBCUTANEOUS
Status: COMPLETED | OUTPATIENT
Start: 2018-06-25 | End: 2018-06-25

## 2018-06-25 RX ORDER — SODIUM CHLORIDE, SODIUM LACTATE, POTASSIUM CHLORIDE, CALCIUM CHLORIDE 600; 310; 30; 20 MG/100ML; MG/100ML; MG/100ML; MG/100ML
125 INJECTION, SOLUTION INTRAVENOUS CONTINUOUS
Status: DISCONTINUED | OUTPATIENT
Start: 2018-06-25 | End: 2018-06-25

## 2018-06-25 RX ORDER — SODIUM CHLORIDE 0.9 % (FLUSH) 0.9 %
5-10 SYRINGE (ML) INJECTION AS NEEDED
Status: DISCONTINUED | OUTPATIENT
Start: 2018-06-25 | End: 2018-06-25 | Stop reason: HOSPADM

## 2018-06-25 RX ORDER — GLYCOPYRROLATE 0.2 MG/ML
INJECTION INTRAMUSCULAR; INTRAVENOUS AS NEEDED
Status: DISCONTINUED | OUTPATIENT
Start: 2018-06-25 | End: 2018-06-25 | Stop reason: HOSPADM

## 2018-06-25 RX ORDER — DEXTROSE 50 % IN WATER (D50W) INTRAVENOUS SYRINGE
25-50 AS NEEDED
Status: DISCONTINUED | OUTPATIENT
Start: 2018-06-25 | End: 2018-06-25 | Stop reason: HOSPADM

## 2018-06-25 RX ORDER — DEXTROSE, SODIUM CHLORIDE, SODIUM LACTATE, POTASSIUM CHLORIDE, AND CALCIUM CHLORIDE 5; .6; .31; .03; .02 G/100ML; G/100ML; G/100ML; G/100ML; G/100ML
75 INJECTION, SOLUTION INTRAVENOUS CONTINUOUS
Status: DISPENSED | OUTPATIENT
Start: 2018-06-25 | End: 2018-06-26

## 2018-06-25 RX ORDER — SODIUM CHLORIDE 0.9 % (FLUSH) 0.9 %
5-10 SYRINGE (ML) INJECTION AS NEEDED
Status: DISCONTINUED | OUTPATIENT
Start: 2018-06-25 | End: 2018-06-27 | Stop reason: HOSPADM

## 2018-06-25 RX ORDER — PROPOFOL 10 MG/ML
INJECTION, EMULSION INTRAVENOUS AS NEEDED
Status: DISCONTINUED | OUTPATIENT
Start: 2018-06-25 | End: 2018-06-25 | Stop reason: HOSPADM

## 2018-06-25 RX ORDER — RANITIDINE 150 MG/1
300 TABLET, FILM COATED ORAL DAILY
Status: DISCONTINUED | OUTPATIENT
Start: 2018-06-26 | End: 2018-06-27 | Stop reason: HOSPADM

## 2018-06-25 RX ORDER — OXYCODONE HYDROCHLORIDE 5 MG/1
5 TABLET ORAL
Status: DISCONTINUED | OUTPATIENT
Start: 2018-06-25 | End: 2018-06-27

## 2018-06-25 RX ORDER — CALCIUM POLYCARBOPHIL 625 MG
1 TABLET ORAL DAILY
Status: DISCONTINUED | OUTPATIENT
Start: 2018-06-26 | End: 2018-06-27 | Stop reason: HOSPADM

## 2018-06-25 RX ADMIN — KETOROLAC TROMETHAMINE 15 MG: 15 INJECTION, SOLUTION INTRAMUSCULAR; INTRAVENOUS at 23:00

## 2018-06-25 RX ADMIN — GLYCOPYRROLATE 0.2 MG: 0.2 INJECTION INTRAMUSCULAR; INTRAVENOUS at 10:04

## 2018-06-25 RX ADMIN — HYDROMORPHONE HYDROCHLORIDE 0.5 MG: 2 INJECTION, SOLUTION INTRAMUSCULAR; INTRAVENOUS; SUBCUTANEOUS at 12:17

## 2018-06-25 RX ADMIN — FENTANYL CITRATE 50 MCG: 50 INJECTION, SOLUTION INTRAMUSCULAR; INTRAVENOUS at 11:18

## 2018-06-25 RX ADMIN — DOCUSATE SODIUM 100 MG: 100 CAPSULE, LIQUID FILLED ORAL at 21:18

## 2018-06-25 RX ADMIN — OXYCODONE HYDROCHLORIDE 5 MG: 5 TABLET ORAL at 23:00

## 2018-06-25 RX ADMIN — TRAMADOL HYDROCHLORIDE 100 MG: 50 TABLET, FILM COATED ORAL at 14:09

## 2018-06-25 RX ADMIN — LIDOCAINE HYDROCHLORIDE 60 MG: 20 INJECTION, SOLUTION EPIDURAL; INFILTRATION; INTRACAUDAL; PERINEURAL at 10:06

## 2018-06-25 RX ADMIN — TRAMADOL HYDROCHLORIDE 100 MG: 50 TABLET, FILM COATED ORAL at 19:31

## 2018-06-25 RX ADMIN — ONDANSETRON 4 MG: 2 INJECTION INTRAMUSCULAR; INTRAVENOUS at 10:04

## 2018-06-25 RX ADMIN — OXYCODONE HYDROCHLORIDE 5 MG: 5 TABLET ORAL at 17:42

## 2018-06-25 RX ADMIN — ENOXAPARIN SODIUM 40 MG: 40 INJECTION, SOLUTION INTRAVENOUS; SUBCUTANEOUS at 23:50

## 2018-06-25 RX ADMIN — FENTANYL CITRATE 25 MCG: 50 INJECTION, SOLUTION INTRAMUSCULAR; INTRAVENOUS at 11:01

## 2018-06-25 RX ADMIN — FENTANYL CITRATE 25 MCG: 50 INJECTION, SOLUTION INTRAMUSCULAR; INTRAVENOUS at 10:47

## 2018-06-25 RX ADMIN — ATORVASTATIN CALCIUM 20 MG: 20 TABLET, FILM COATED ORAL at 21:18

## 2018-06-25 RX ADMIN — SODIUM CHLORIDE, SODIUM LACTATE, POTASSIUM CHLORIDE, AND CALCIUM CHLORIDE 125 ML/HR: 600; 310; 30; 20 INJECTION, SOLUTION INTRAVENOUS at 08:32

## 2018-06-25 RX ADMIN — SODIUM CHLORIDE, SODIUM LACTATE, POTASSIUM CHLORIDE, CALCIUM CHLORIDE, AND DEXTROSE MONOHYDRATE 75 ML/HR: 600; 310; 30; 20; 5 INJECTION, SOLUTION INTRAVENOUS at 15:19

## 2018-06-25 RX ADMIN — KETOROLAC TROMETHAMINE 15 MG: 15 INJECTION, SOLUTION INTRAMUSCULAR; INTRAVENOUS at 19:32

## 2018-06-25 RX ADMIN — SODIUM CHLORIDE, SODIUM LACTATE, POTASSIUM CHLORIDE, AND CALCIUM CHLORIDE: 600; 310; 30; 20 INJECTION, SOLUTION INTRAVENOUS at 11:05

## 2018-06-25 RX ADMIN — SODIUM CHLORIDE, SODIUM LACTATE, POTASSIUM CHLORIDE, CALCIUM CHLORIDE, AND DEXTROSE MONOHYDRATE 75 ML/HR: 600; 310; 30; 20; 5 INJECTION, SOLUTION INTRAVENOUS at 14:10

## 2018-06-25 RX ADMIN — Medication 2 G: at 10:01

## 2018-06-25 RX ADMIN — Medication 10 MG: at 10:21

## 2018-06-25 RX ADMIN — FENTANYL CITRATE 25 MCG: 50 INJECTION, SOLUTION INTRAMUSCULAR; INTRAVENOUS at 10:35

## 2018-06-25 RX ADMIN — MIDAZOLAM HYDROCHLORIDE 2 MG: 1 INJECTION, SOLUTION INTRAMUSCULAR; INTRAVENOUS at 10:01

## 2018-06-25 RX ADMIN — PROPOFOL 200 MG: 10 INJECTION, EMULSION INTRAVENOUS at 10:07

## 2018-06-25 RX ADMIN — FENTANYL CITRATE 100 MCG: 50 INJECTION, SOLUTION INTRAMUSCULAR; INTRAVENOUS at 10:06

## 2018-06-25 RX ADMIN — KETOROLAC TROMETHAMINE 15 MG: 15 INJECTION, SOLUTION INTRAMUSCULAR; INTRAVENOUS at 14:09

## 2018-06-25 RX ADMIN — HYDROMORPHONE HYDROCHLORIDE 0.5 MG: 2 INJECTION, SOLUTION INTRAMUSCULAR; INTRAVENOUS; SUBCUTANEOUS at 12:37

## 2018-06-25 RX ADMIN — Medication 2 G: at 17:42

## 2018-06-25 RX ADMIN — FENTANYL CITRATE 25 MCG: 50 INJECTION, SOLUTION INTRAMUSCULAR; INTRAVENOUS at 10:30

## 2018-06-25 RX ADMIN — Medication 10 MG: at 10:24

## 2018-06-25 RX ADMIN — TRAMADOL HYDROCHLORIDE 100 MG: 50 TABLET, FILM COATED ORAL at 21:18

## 2018-06-25 RX ADMIN — Medication 10 ML: at 21:18

## 2018-06-25 RX ADMIN — HYDROMORPHONE HYDROCHLORIDE 0.5 MG: 2 INJECTION, SOLUTION INTRAMUSCULAR; INTRAVENOUS; SUBCUTANEOUS at 12:07

## 2018-06-25 RX ADMIN — HYDROMORPHONE HYDROCHLORIDE 0.5 MG: 2 INJECTION, SOLUTION INTRAMUSCULAR; INTRAVENOUS; SUBCUTANEOUS at 12:27

## 2018-06-25 NOTE — ANESTHESIA PREPROCEDURE EVALUATION
Anesthetic History   No history of anesthetic complications            Review of Systems / Medical History  Patient summary reviewed, nursing notes reviewed and pertinent labs reviewed    Pulmonary  Within defined limits                 Neuro/Psych   Within defined limits           Cardiovascular    Hypertension          CAD         GI/Hepatic/Renal     GERD           Endo/Other  Within defined limits           Other Findings            Physical Exam    Airway  Mallampati: II  TM Distance: 4 - 6 cm  Neck ROM: normal range of motion   Mouth opening: Normal     Cardiovascular  Regular rate and rhythm,  S1 and S2 normal,  no murmur, click, rub, or gallop             Dental    Dentition: Lower partial plate and Upper partial plate     Pulmonary  Breath sounds clear to auscultation               Abdominal  GI exam deferred       Other Findings            Anesthetic Plan    ASA: 3  Anesthesia type: general          Induction: Intravenous  Anesthetic plan and risks discussed with: Family and Patient

## 2018-06-25 NOTE — PROGRESS NOTES
1320 - Patient arrives to unit at this time. Admission completed at this time. Patient is A/O x 4. IV to right hand intact and patent. Brando to LLE and plexi's applied bilaterally. Elastic dressing to RLE CDI. Denies numbness/tingling. Pain 9/10. Patient was oriented to the room to include use of call bell, meal ordering, and use of incentive spirometer. Patient was given explanation of \"up for dinner\" program and has verbalized understanding. Phone and call bell left within reach. Plan of care for the day addressed with patient. Educated on pain medication availability and possible side effects. 1742 - Pain 9/10. PRN oxycodone 5 mg pain medication administered at this time. Patient has been educated on side effects. Side effect education sheets have been provided. 1940 - Bedside and Verbal shift change report given to Tamie Christina RN by Deanna Camargo RN. Report included the following information SBAR, Kardex, OR Summary, Intake/Output and MAR.

## 2018-06-25 NOTE — OP NOTES
[facility] [place]  OPERATIVE REPORT    Ryan Carvalho  MR#: [id number]  : 1960  ACCOUNT #: [de-identified]   DATE OF SERVICE: 2018    SURGEON:  Lukasz Camacho MD    ASSISTANT:  None. ANESTHESIA:  General.      ANESTHESIOLOGIST:  Dr. Tolbert Range:  None. COMPLICATIONS:  None. ESTIMATED BLOOD LOSS:  25 mL     IMPLANTS:  ConforMIS iTotal right knee. INDICATIONS:  A 56-year-old male with symptomatic right knee arthritis who comes into surgery for knee replacement:      PREOPERATIVE DIAGNOSIS:  Right knee degenerative joint disease. POSTOPERATIVE DIAGNOSIS:  Right knee degenerative joint disease. OPERATION:  Right total knee replacement. DESCRIPTION:  The patient was brought to the operating room after receiving antibiotics. General anesthesia was then administered. Tourniquet was placed on the right side. The right lower extremity was prepped and draped sterilely. After exsanguination, tourniquet was inflated to 350 mmHg. Midline incision was made. Flaps were developed. Medial arthrotomy was performed. The patella was everted, measured, cut, drilled to accept a 3-hole oval patella and a protective cap was placed. At this point, with the knee flexed to 90 degrees, utilizing the patient's specific femoral guide, the distal femoral cut was made. A patient specific guide was used to cut the proximal tibia and the extension gap was verified. At this point, the patient specific guide was pinned onto the femur and the anterior, posterior and chamfering cut was made. Two additional blocks were used to create the chamfering cuts. At this point, the remnants of the menisci were removed preserving the PCL and popliteus. Posterior capsule was Bovied and additional local anesthesia was injected throughout the knee. A trial reduction was made with the femoral trial and a spacer block. The knee could be fully extended with normal stability.  At this point, the tibia was prepared with a tibial patient specific guide. At this point, the wound was thoroughly irrigated and cement was prepared. Tibial component was cemented. The medial and lateral bearings were snapped into position and then the femoral component was cemented, followed by the patella component. Excess cement was removed and allowed to cure. At this point, after irrigation, the arthrotomy incision was closed with Vicryl, subcu with Vicryl, skin was closed with Dermabond and staples. An Aquacel dressing, followed by a thigh-high YUSUF stocking was applied. Tourniquet was released with normal filling distally and the patient remained neurovascularly intact went to recovery in stable condition.       MD Dorcas Trujillo / Suzie.Goodpasture  D: 06/25/2018 09:54     T: 06/25/2018 12:45  JOB #: [id number]

## 2018-06-25 NOTE — IP AVS SNAPSHOT
[address] [address] 
[phone] Patient: Lisa Dubois MRN: [id number] A check briana indicates which time of day the medication should be taken. My Medications START taking these medications Instructions Each Dose to Equal  
 Morning Noon Evening Bedtime  
 oxyCODONE IR 5 mg immediate release tablet Commonly known as:  Lanie Mane Your last dose was: Your next dose is: Take 2 Tabs by mouth every four (4) hours as needed. Max Daily Amount: 60 mg.  
 10 mg  
    
   
   
   
  
 traMADol 50 mg tablet Commonly known as:  ULTRAM  
   
Your last dose was: Your next dose is: Take 2 Tabs by mouth four (4) times daily. Max Daily Amount: 400 mg.  
 100 mg CHANGE how you take these medications Instructions Each Dose to Equal  
 Morning Noon Evening Bedtime * TYLENOL 325 mg tablet Generic drug:  acetaminophen What changed:  Another medication with the same name was added. Make sure you understand how and when to take each. Your last dose was: Your next dose is: Take  by mouth every four (4) hours as needed for Pain. * acetaminophen 325 mg tablet Commonly known as:  TYLENOL What changed: You were already taking a medication with the same name, and this prescription was added. Make sure you understand how and when to take each. Your last dose was: Your next dose is: Take 2 Tabs by mouth every four (4) hours as needed. 650 mg  
    
   
   
   
  
 * Notice: This list has 2 medication(s) that are the same as other medications prescribed for you. Read the directions carefully, and ask your doctor or other care provider to review them with you. CONTINUE taking these medications  Instructions Each Dose to Equal  
 Morning Noon Evening Bedtime  
 atorvastatin 20 mg tablet Commonly known as:  LIPITOR Your last dose was: Your next dose is: Take 20 mg by mouth nightly. 20 mg  
    
   
   
   
  
 FIBER-TABS 625 mg tablet Generic drug:  calcium polycarbophil Your last dose was: Your next dose is: Take 625 mg by mouth daily. 625 mg  
    
   
   
   
  
 KlonoPIN 0.5 mg tablet Generic drug:  clonazePAM  
   
Your last dose was: Your next dose is: Take  by mouth nightly as needed. lisinopril 5 mg tablet Commonly known as:  Vancleave Ashwini Your last dose was: Your next dose is: Take 5 mg by mouth daily. 5 mg  
    
   
   
   
  
 nitroglycerin 0.4 mg SL tablet Commonly known as:  NITROSTAT Your last dose was: Your next dose is: 0.4 mg by SubLINGual route every five (5) minutes as needed for Chest Pain. 0.4 mg  
    
   
   
   
  
 PLAVIX 75 mg Tab Generic drug:  clopidogrel Your last dose was: Your next dose is: Take 75 mg by mouth. 75 mg  
    
   
   
   
  
 raNITIdine 300 mg tablet Commonly known as:  ZANTAC Your last dose was: Your next dose is: Take 300 mg by mouth daily. 300 mg ZOLOFT 25 mg tablet Generic drug:  sertraline Your last dose was: Your next dose is: Take 25 mg by mouth daily. 25 mg Where to Get Your Medications Information on where to get these meds will be given to you by the nurse or doctor. ! Ask your nurse or doctor about these medications  
  acetaminophen 325 mg tablet  
 oxyCODONE IR 5 mg immediate release tablet  
 traMADol 50 mg tablet

## 2018-06-25 NOTE — ANESTHESIA POSTPROCEDURE EVALUATION
Post-Anesthesia Evaluation and Assessment    Cardiovascular Function/Vital Signs  Visit Vitals    /90    Pulse 87    Temp 36.2 °C (97.2 °F)    Resp 19    Ht 5' 11\" (1.803 m)    Wt 97.6 kg (215 lb 2 oz)    SpO2 95%    BMI 30 kg/m2       Patient is status post Procedure(s):  RIGHT TOTAL KNEE REPLACEMENT W/CONFORMIS. Nausea/Vomiting: Controlled. Postoperative hydration reviewed and adequate. Pain:  Pain Scale 1: Numeric (0 - 10) (06/25/18 1236)  Pain Intensity 1: 2 (06/25/18 1236)   Managed. Neurological Status:   Neuro (WDL): Within Defined Limits (06/25/18 1155)   At baseline. Mental Status and Level of Consciousness: Baseline and stable. Pulmonary Status:   O2 Device: Nasal cannula (06/25/18 1236)   Adequate oxygenation and airway patent. Complications related to anesthesia: None    Post-anesthesia assessment completed. No concerns. Patient has met all discharge requirements.     Signed By: Clotilde Villatoro MD

## 2018-06-25 NOTE — PROGRESS NOTES
Problem: Mobility Impaired (Adult and Pediatric)  Goal: *Acute Goals and Plan of Care (Insert Text)  In 1-7 days pt will be able to perform:  ST.  Bed mobility:  Rolling L to R to L modified independent for positioning. 2.  Supine to sit to supine S with HR for meals. 3.  Sit to stand to sit S with RW in prep for ambulation. LT.  Gait:  Ambulate >150ft S with RW, WBAT, for home/community mobility. 2.  Stair Negotiation:  Ascend/descend >5 steps CGA with HR for home entry. 3.  Activity tolerance: Tolerate up in chair 1-2 hours for ADLs. 4.  Patient/Family Education:  Patient/family to be independent with HEP for follow-up care and safe discharge. physical Therapy EVALUATION    Patient: Austen Joyner (89 y.o. male)  Date: 2018  Primary Diagnosis: RIGHT KNEE DEGENERATIVE JOINT DISEASE,HYPERTENSION,HYPERLIPIDEMIA  DJD (degenerative joint disease) of knee  Procedure(s) (LRB):  RIGHT TOTAL KNEE REPLACEMENT W/CONFORMIS (Right) Day of Surgery   Precautions:   Fall, WBAT    ASSESSMENT :  Based on the objective data described below, the patient presents with decreased functional mobility and independence in regard to bed mobility, transfers, gt quality and tolerance, R knee AROM, R knee strength, pain, stair negotiation and safety due to recent R TKA surgery. Pt rating pain in R knee 9/10 on numerical pain scale. Pt required CGA for bed mobility, min A/CGA for sit<>stand. Pt able to participate in gt training w/ RW, WBAT, GB and CGA to hallway w/ antalgic pattern. Pt returned to sitting EOB  w/ all needs within reach, and ice pack to R knee. Nurse Jasbir aware and visitor present. Recommend [facility] upon hospital d/c. Recommend RW vs axillary crutches. Patient will benefit from skilled intervention to address the above impairments.   Patients rehabilitation potential is considered to be Good  Factors which may influence rehabilitation potential include:   []         None noted  []         Mental ability/status  []         Medical condition  []         Home/family situation and support systems  []         Safety awareness  [x]         Pain tolerance/management  []         Other:      PLAN :  Recommendations and Planned Interventions:  [x]           Bed Mobility Training             []    Neuromuscular Re-Education  [x]           Transfer Training                   []    Orthotic/Prosthetic Training  [x]           Gait Training                          []    Modalities  [x]           Therapeutic Exercises          []    Edema Management/Control  [x]           Therapeutic Activities            [x]    Patient and Family Training/Education  []           Other (comment):    Frequency/Duration: Patient will be followed by physical therapy twice daily to address goals. Discharge Recommendations: Home Health  Further Equipment Recommendations for Discharge: rolling walker     SUBJECTIVE:   Patient stated I did not even know the surgery was done.     OBJECTIVE DATA SUMMARY:     Past Medical History:   Diagnosis Date    Acid reflux     Arthritis     CAD (coronary artery disease)     MI x 2 with stents    GERD (gastroesophageal reflux disease)     High cholesterol     Hypertension 2010     Past Surgical History:   Procedure Laterality Date    HX CORONARY STENT PLACEMENT      HX HEENT      teeth removal    HX ORTHOPAEDIC Right     knee open procedure after accident    NEUROLOGICAL PROCEDURE UNLISTED      lower back     Barriers to Learning/Limitations: None  Compensate with: visual, verbal, tactile, kinesthetic cues/model  Prior Level of Function/Home Situation:   Home Situation  Home Environment: Apartment  # Steps to Enter: 15  Rails to Enter: Yes  Hand Rails : Bilateral  One/Two Story Residence: One story  Living Alone: No  Support Systems: Spouse/Significant Other/Partner  Patient Expects to be Discharged to[de-identified] Private residence  Current DME Used/Available at Home: Crutches  Critical Behavior:  Neurologic State: Alert; Appropriate for age  Orientation Level: Oriented X4  Cognition: Appropriate decision making; Appropriate for age attention/concentration; Appropriate safety awareness; Follows commands  Safety/Judgement: Awareness of environment  Psychosocial  Patient Behaviors: Calm; Cooperative  Purposeful Interaction: Yes  Pt Identified Daily Priority: Clinical issues (comment)  Caritas Process: Nurture loving kindness;Establish trust;Teaching/learning  Caring Interventions: Reassure  Reassure: Therapeutic listening; Informing  Skin Condition/Temp: Dry;Warm  Skin Integrity: Incision (comment) (R knee)  Skin Integumentary  Skin Color: Appropriate for ethnicity  Skin Condition/Temp: Dry;Warm  Skin Integrity: Incision (comment) (R knee)  Turgor: Non-tenting  Hair Growth: Present  Varicosities: Absent  Strength:    Strength: Generally decreased, functional  Tone & Sensation:   Tone: Normal  Sensation: Intact  Range Of Motion:  AROM: Generally decreased, functional  Functional Mobility:  Bed Mobility:  Supine to Sit: Contact guard assistance (vc)  Scooting: Contact guard assistance (vc)  Transfers:  Sit to Stand: Minimum assistance;Contact guard assistance (vc)  Stand to Sit: Contact guard assistance (vc)  Balance:   Sitting: Intact  Standing: Intact; With support  Ambulation/Gait Training:  Distance (ft): 30 Feet (ft)  Assistive Device: Walker, rolling;Gait belt  Ambulation - Level of Assistance: Contact guard assistance (vc)  Gait Abnormalities: Antalgic;Decreased step clearance; Step to gait  Right Side Weight Bearing: As tolerated  Base of Support: Shift to left  Stance: Right decreased  Speed/Suad: Slow  Step Length: Left shortened;Right shortened  Swing Pattern: Left asymmetrical;Right asymmetrical  Interventions: Safety awareness training; Tactile cues; Verbal cues  Therapeutic Exercises:   HEP written copy issued to pt per MD protocol.   Pain:  Pain Scale 1: Numeric (0 - 10)  Pain Intensity 1: 9  Pain Location 1: Knee  Pain Orientation 1: Right  Pain Description 1: Aching  Pain Intervention(s) 1: Medication (see MAR); Ice  Activity Tolerance:   Fair   Please refer to the flowsheet for vital signs taken during this treatment. After treatment:   [x]         Patient left in no apparent distress sitting up EOB  []         Patient left in no apparent distress in bed  [x]         Call bell left within reach  [x]         Nursing notified  []         Caregiver present  []         Bed alarm activated    COMMUNICATION/EDUCATION:   [x]         Fall prevention education was provided and the patient/caregiver indicated understanding. [x]         Patient/family have participated as able in goal setting and plan of care. [x]         Patient/family agree to work toward stated goals and plan of care. []         Patient understands intent and goals of therapy, but is neutral about his/her participation. []         Patient is unable to participate in goal setting and plan of care. Thank you for this referral.  Don Alvarado, PT   Time Calculation: 23 mins      Eval Complexity: History: HIGH Complexity :3+ comorbidities / personal factors will impact the outcome/ POC Exam:MEDIUM Complexity : 3 Standardized tests and measures addressing body structure, function, activity limitation and / or participation in recreation  Presentation: LOW Complexity : Stable, uncomplicated  Clinical Decision Making:Low Complexity amb >30' Overall Complexity:LOW      Mobility  Current  CJ= 20-39%   Goal  CI= 1-19%. The severity rating is based on the Level of Assistance required for Functional Mobility and ADLs.

## 2018-06-25 NOTE — BRIEF OP NOTE
BRIEF OPERATIVE NOTE    Date of Procedure: 6/25/2018   Preoperative Diagnosis: RIGHT KNEE DEGENERATIVE JOINT DISEASE  Postoperative Diagnosis: same    Procedure(s):  RIGHT TOTAL KNEE REPLACEMENT W/SIMONEIS  Surgeon(s) and Role:     * Bereket Nguyễn MD - Primary         Surgical Assistant: none    Surgical Staff:  * No surgical staff found *  No case tracking events are documented in the log.   Anesthesia: General   Estimated Blood Loss: 25ml  Specimens: * No specimens in log *   Findings: djd   Complications: none  Implants: * No implants in log *

## 2018-06-25 NOTE — H&P
History and Physical        Patient: Aysha Hallman               Sex: male          DOA: 6/25/2018         YOB: 1960      Age:  63 y.o.        LOS:  LOS: 0 days        HPI:     Aysha Hallman is a 63 y.o. male who has right knee DJD for TKR has failed non-op therapy    Past Medical History:   Diagnosis Date    Acid reflux     Arthritis     CAD (coronary artery disease)     MI x 2 with stents    GERD (gastroesophageal reflux disease)     High cholesterol     Hypertension 2010       Past Surgical History:   Procedure Laterality Date    HX CORONARY STENT PLACEMENT      HX HEENT      teeth removal    HX ORTHOPAEDIC Right     knee open procedure after accident   [id number] [facility]      lower back       History reviewed. No pertinent family history. Social History     Social History    Marital status:      Spouse name: N/A    Number of children: N/A    Years of education: N/A     Social History Main Topics    Smoking status: Current Some Day Smoker     Packs/day: 1.00     Years: 40.00    Smokeless tobacco: Never Used      Comment: advised to hold all tobacco 24 hours prior     Alcohol use Yes      Comment: 2 x month    Drug use: No    Sexual activity: Not Asked     Other Topics Concern    None     Social History Narrative       Prior to Admission medications    Medication Sig Start Date End Date Taking? Authorizing Provider   acetaminophen (TYLENOL) 325 mg tablet Take  by mouth every four (4) hours as needed for Pain. Yes Historical Provider   lisinopril (PRINIVIL, ZESTRIL) 5 mg tablet Take 5 mg by mouth daily. Yes Laverne Up MD   raNITIdine (ZANTAC) 300 mg tablet Take 300 mg by mouth daily. Yes Laverne Up MD   clonazePAM (KLONOPIN) 0.5 mg tablet Take  by mouth nightly as needed. Historical Provider   sertraline (ZOLOFT) 25 mg tablet Take 25 mg by mouth daily.     Historical Provider   calcium polycarbophil (FIBER-TABS) 625 mg tablet Take 625 mg by mouth daily. Historical Provider   clopidogrel (PLAVIX) 75 mg tab Take 75 mg by mouth. Laverne Up MD   atorvastatin (LIPITOR) 20 mg tablet Take 20 mg by mouth nightly. Laverne Up MD   nitroglycerin (NITROSTAT) 0.4 mg SL tablet 0.4 mg by SubLINGual route every five (5) minutes as needed for Chest Pain. Laverne Up MD       No Known Allergies    Review of Systems  Pertinent items are noted in the History of Present Illness. Physical Exam:      Visit Vitals    BP (!) 135/94 (BP 1 Location: Right arm, BP Patient Position: At rest)    Pulse 66    Temp 96.6 °F (35.9 °C)    Resp 18    Ht 5' 11\" (1.803 m)    Wt 97.6 kg (215 lb 2 oz)    SpO2 99%    BMI 30 kg/m2       Physical Exam:  Physical Exam:   General:  Alert, cooperative, no distress, appears stated age. Eyes:  Conjunctivae/corneas clear. PERRL, EOMs intact. Fundi benign   Ears:  Normal TMs and external ear canals both ears. Nose: Nares normal. Septum midline. Mucosa normal. No drainage or sinus tenderness. Mouth/Throat: Lips, mucosa, and tongue normal. Teeth and gums normal.   Neck: Supple, symmetrical, trachea midline, no adenopathy, thyroid: no enlargement/tenderness/nodules, no carotid bruit and no JVD. Back:   Symmetric, no curvature. ROM normal. No CVA tenderness. Lungs:   Clear to auscultation bilaterally. Heart:  Regular rate and rhythm, S1, S2 normal, no murmur, click, rub or gallop. Abdomen:   Soft, non-tender. Bowel sounds normal. No masses,  No organomegaly. Extremities: Extremities normal, atraumatic, no cyanosis or edema. Right knee pain with ROM   Pulses: 2+ and symmetric all extremities. Skin: Skin color, texture, turgor normal. No rashes or lesions   Lymph nodes: Cervical, supraclavicular, and axillary nodes normal.   Neurologic: CNII-XII intact. Normal strength, sensation and reflexes throughout. Labs Reviewed: All lab results for the last 24 hours reviewed.     Assessment/Plan     Active Problems:    DJD (degenerative joint disease) of knee (6/25/2018)    CAD hx stent    Right TKR

## 2018-06-25 NOTE — IP AVS SNAPSHOT
[address] [address] 
[phone] Patient: Robbi Sparks MRN: [id number] ZTA: About your hospitalization You were admitted on:  2018 You last received care in the:  THE [facility] [address] You were discharged on:  2018 Why you were hospitalized Your primary diagnosis was:  Not on File Your diagnoses also included:  Djd (Degenerative Joint Disease) Of Knee Follow-up Information Follow up With Details Comments Contact Info Harrison Ireland MD On 7/10/2018 Follow up appointment @ 9:45am [address] 1000 [facility] [id number] [phone] 300 [facility] to continue managing your healthcare needs. [phone] Jozef Moise MD    [address] 1000 [facility] [address] 
[phone] Discharge Orders None A check briana indicates which time of day the medication should be taken. My Medications START taking these medications Instructions Each Dose to Equal  
 Morning Noon Evening Bedtime  
 oxyCODONE IR 5 mg immediate release tablet Commonly known as:  Seldon Breslow Your last dose was: Your next dose is: Take 2 Tabs by mouth every four (4) hours as needed. Max Daily Amount: 60 mg.  
 10 mg  
    
   
   
   
  
 traMADol 50 mg tablet Commonly known as:  ULTRAM  
   
Your last dose was: Your next dose is: Take 2 Tabs by mouth four (4) times daily. Max Daily Amount: 400 mg.  
 100 mg CHANGE how you take these medications Instructions Each Dose to Equal  
 Morning Noon Evening Bedtime * TYLENOL 325 mg tablet Generic drug:  acetaminophen What changed:  Another medication with the same name was added. Make sure you understand how and when to take each. Your last dose was: Your next dose is: Take  by mouth every four (4) hours as needed for Pain. * acetaminophen 325 mg tablet Commonly known as:  TYLENOL What changed: You were already taking a medication with the same name, and this prescription was added. Make sure you understand how and when to take each. Your last dose was: Your next dose is: Take 2 Tabs by mouth every four (4) hours as needed. 650 mg  
    
   
   
   
  
 * Notice: This list has 2 medication(s) that are the same as other medications prescribed for you. Read the directions carefully, and ask your doctor or other care provider to review them with you. CONTINUE taking these medications Instructions Each Dose to Equal  
 Morning Noon Evening Bedtime  
 atorvastatin 20 mg tablet Commonly known as:  LIPITOR Your last dose was: Your next dose is: Take 20 mg by mouth nightly. 20 mg  
    
   
   
   
  
 FIBER-TABS 625 mg tablet Generic drug:  calcium polycarbophil Your last dose was: Your next dose is: Take 625 mg by mouth daily. 625 mg  
    
   
   
   
  
 KlonoPIN 0.5 mg tablet Generic drug:  clonazePAM  
   
Your last dose was: Your next dose is: Take  by mouth nightly as needed. lisinopril 5 mg tablet Commonly known as:  Pecola Cypher Your last dose was: Your next dose is: Take 5 mg by mouth daily. 5 mg  
    
   
   
   
  
 nitroglycerin 0.4 mg SL tablet Commonly known as:  NITROSTAT Your last dose was: Your next dose is: 0.4 mg by SubLINGual route every five (5) minutes as needed for Chest Pain. 0.4 mg  
    
   
   
   
  
 PLAVIX 75 mg Tab Generic drug:  clopidogrel Your last dose was: Your next dose is: Take 75 mg by mouth. 75 mg  
    
   
   
   
  
 raNITIdine 300 mg tablet Commonly known as:  ZANTAC Your last dose was: Your next dose is: Take 300 mg by mouth daily. 300 mg ZOLOFT 25 mg tablet Generic drug:  sertraline Your last dose was: Your next dose is: Take 25 mg by mouth daily. 25 mg Where to Get Your Medications Information on where to get these meds will be given to you by the nurse or doctor. ! Ask your nurse or doctor about these medications  
  acetaminophen 325 mg tablet  
 oxyCODONE IR 5 mg immediate release tablet  
 traMADol 50 mg tablet Opioid Education Prescription Opioids: What You Need to Know: 
 
Prescription opioids can be used to help relieve moderate-to-severe pain and are often prescribed following a surgery or injury, or for certain health conditions. These medications can be an important part of treatment but also come with serious risks. Opioids are strong pain medicines. Examples include hydrocodone, oxycodone, fentanyl, and morphine. Heroin is an example of an illegal opioid. It is important to work with your health care provider to make sure you are getting the safest, most effective care. WHAT ARE THE RISKS AND SIDE EFFECTS OF OPIOID USE? Prescription opioids carry serious risks of addiction and overdose, especially with prolonged use. An opioid overdose, often marked by slow breathing, can cause sudden death. The use of prescription opioids can have a number of side effects as well, even when taken as directed. · Tolerance-meaning you might need to take more of a medication for the same pain relief · Physical dependence-meaning you have symptoms of withdrawal when the medication is stopped. Withdrawal symptoms can include nausea, sweating, chills, diarrhea, stomach cramps, and muscle aches. Withdrawal can last up to several weeks, depending on which drug you took and how long you took it. · Increased sensitivity to pain · Constipation · Nausea, vomiting, and dry mouth · Sleepiness and dizziness · Confusion · Depression · Low levels of testosterone that can result in lower sex drive, energy, and strength · Itching and sweating RISKS ARE GREATER WITH:      
· History of drug misuse, substance use disorder, or overdose · Mental health conditions (such as depression or anxiety) · Sleep apnea · Older age (72 years or older) · Pregnancy Avoid alcohol while taking prescription opioids. Also, unless specifically advised by your health care provider, medications to avoid include: · Benzodiazepines (such as Xanax or Valium) · Muscle relaxants (such as Soma or Flexeril) · Hypnotics (such as Ambien or Lunesta) · Other prescription opioids KNOW YOUR OPTIONS Talk to your health care provider about ways to manage your pain that don't involve prescription opioids. Some of these options may actually work better and have fewer risks and side effects. Options may include: 
· Pain relievers such as acetaminophen, ibuprofen, and naproxen · Some medications that are also used for depression or seizures · Physical therapy and exercise · Counseling to help patients learn how to cope better with triggers of pain and stress. · Application of heat or cold compress · Massage therapy · Relaxation techniques Be Informed Make sure you know the name of your medication, how much and how often to take it, and its potential risks & side effects. IF YOU ARE PRESCRIBED OPIOIDS FOR PAIN: 
· Never take opioids in greater amounts or more often than prescribed. Remember the goal is not to be pain-free but to manage your pain at a tolerable level. · Follow up with your primary care provider to: · Work together to create a plan on how to manage your pain. · Talk about ways to help manage your pain that don't involve prescription opioids. · Talk about any and all concerns and side effects. · Help prevent misuse and abuse. · Never sell or share prescription opioids · Help prevent misuse and abuse. · Store prescription opioids in a secure place and out of reach of others (this may include visitors, children, friends, and family). · Safely dispose of unused/unwanted prescription opioids: Find your community drug take-back program or your pharmacy mail-back program, or flush them down the toilet, following guidance from the Food and Drug Administration (www.fda.gov/Drugs/ResourcesForYou). · Visit www.cdc.gov/drugoverdose to learn about the risks of opioid abuse and overdose. · If you believe you may be struggling with addiction, tell your health care provider and ask for guidance or call [employer] at 1-582-588-OHID. Discharge Instructions Knee Arthroscopy: What to Expect at [facility] Your Recovery Arthroscopy is a way to find problems and do surgery inside a joint without making a large cut (incision). Your doctor put a lighted tube with a tiny camera-called an arthroscope, or scope-and surgical tools through small incisions in your knee. You will feel tired for several days. Your knee will be swollen, and you may notice that your skin is a different color near the cuts (incisions). The swelling is normal and will start to go away in a few days. Keeping your leg higher than your heart will help with swelling and pain. You will probably need about 6 weeks to recover. If your doctor repaired damaged tissue, recovery will take longer. You may have to limit your activity until your knee strength and movement return to normal. You may also be in a physical rehabilitation (rehab) program. 
You may be able to return to a desk job or your normal routine in a few days.  But if you do physical labor, it may be as long as 2 months before you can return to work. This care sheet gives you a general idea about how long it will take for you to recover. But each person recovers at a different pace. Follow the steps below to get better as quickly as possible. How can you care for yourself at home? Activity ? · Rest when you feel tired. Getting enough sleep will help you recover. Use pillows to raise your ankle and leg above the level of your heart. ? · Try to walk each day, after your doctor has said you can. Start by walking a little more than you did the day before. Bit by bit, increase the amount you walk. Walking boosts blood flow and helps prevent pneumonia and constipation. ? · You may have a brace or crutches or both. ? · Your doctor will tell you how often and how much you can move your leg and knee. ? · If you have a desk job, you may be able to return to work a few days after the surgery. If you lift things or stand or walk a lot at work, it may be as long as 2 months before you can return. ? · You can take a shower 48 to 72 hours after surgery and clean the incisions with regular soap and water. Do not take a bath or soak your knee until your doctor says it is okay. ? · Ask your doctor when you can drive again. ? · If you had a repair of torn tissue, follow your doctor's instructions for lifting things or moving your knee. Diet ? · You can eat your normal diet. If your stomach is upset, try bland, low-fat foods like plain rice, broiled chicken, toast, and yogurt. ? · Drink plenty of fluids, unless your doctor tells you not to. ? · You may notice that your bowel movements are not regular right after your surgery. This is common. Try to avoid constipation and straining with bowel movements. You may want to take a fiber supplement every day. If you have not had a bowel movement after a couple of days, ask your doctor about taking a mild laxative. Medicines ? · Your doctor will tell you if and when you can restart your medicines. He or she will also give you instructions about taking any new medicines. ? · If you take blood thinners, such as warfarin (Coumadin), clopidogrel (Plavix), or aspirin, be sure to talk to your doctor. He or she will tell you if and when to start taking those medicines again. Make sure that you understand exactly what your doctor wants you to do. ? · Be safe with medicines. Take pain medicines exactly as directed. ¨ If the doctor gave you a prescription medicine for pain, take it as prescribed. ¨ If you are not taking a prescription pain medicine, ask your doctor if you can take an over-the-counter medicine. ? · If you think your pain medicine is making you sick to your stomach: 
¨ Take your medicine after meals (unless your doctor has told you not to). ¨ Ask your doctor for a different pain medicine. ? · If your doctor prescribed antibiotics, take them as directed. Do not stop taking them just because you feel better. You need to take the full course of antibiotics. Incision care ? · If you have a dressing over your cuts (incisions), keep it clean and dry. You may remove it 48 to 72 hours after the surgery. ? · If your incisions are open to the air, keep the area clean and dry. ? · If you have strips of tape on the incisions, leave the tape on for a week or until it falls off. Exercise ? · Move your toes and ankle as much as your bandages will allow. ? · Bend and straighten your knee slowly several times during the day. ? · Depending on why you had the surgery, you may have to do ankle and leg exercises. Your doctor or physical therapist will give you exercises as part of a rehabilitation program.  
? · Stop any activity that causes sharp pain. Talk to your doctor or physical therapist about what sports or other exercise you can do. Ice and elevation ? · To reduce swelling and pain, put ice or a cold pack on your knee for 10 to 20 minutes at a time. Do this every 1 to 2 hours. Put a thin cloth between the ice and your skin. Follow-up care is a key part of your treatment and safety. Be sure to make and go to all appointments, and call your doctor if you are having problems. It's also a good idea to know your test results and keep a list of the medicines you take. When should you call for help? Call 911 anytime you think you may need emergency care. For example, call if: 
? · You passed out (lost consciousness). ? · You have severe trouble breathing. ? · You have sudden chest pain and shortness of breath, or you cough up blood. ?Call your doctor now or seek immediate medical care if: 
? · Your foot or toes are numb or tingling. ? · Your foot is cool or pale, or it changes color. ? · You have signs of a blood clot, such as: 
¨ Pain in your calf, back of the knee, thigh, or groin. ¨ Redness and swelling in your leg or groin. ? · You are sick to your stomach or cannot keep fluids down. ? · You have pain that does not get better after you take pain medicine. ? · You have loose stitches, or your incision comes open. ? · Bright red blood has soaked through the bandage over your incision. ? · You have signs of infection, such as: 
¨ Increased pain, swelling, warmth, or redness. ¨ Red streaks leading from the incisions. ¨ Pus draining from the incisions. ¨ A fever. ? Watch closely for any changes in your health, and be sure to contact your doctor if: 
? · You do not have a bowel movement after taking a laxative. Where can you learn more? Go to http://nathaly-meghana.info/. Enter K551 in the search box to learn more about \"Knee Arthroscopy: What to Expect at Home. \" Current as of: March 21, 2017 Content Version: 11.4 © 5985-6410 Healthwise[employer] Incorporated.  Care instructions adapted under license by Keegan Stokes (which disclaims liability or warranty for this information). If you have questions about a medical condition or this instruction, always ask your healthcare professional. [address] any warranty or liability for your use of this information. Madefire Announcement We are excited to announce that we are making your provider's discharge notes available to you in Madefire. You will see these notes when they are completed and signed by the physician that discharged you from your recent hospital stay. If you have any questions or concerns about any information you see in Madefire, please call the Health Information Department where you were seen or reach out to your Primary Care Provider for more information about your plan of care. Introducing [facility]! Radhames Gallegos introduces Madefire patient portal. Now you can access parts of your medical record, email your doctor's office, and request medication refills online. 1. In your internet browser, go to [email or web address] 2. Click on the First Time User? Click Here link in the Sign In box. You will see the New Member Sign Up page. 3. Enter your Madefire Access Code exactly as it appears below. You will not need to use this code after youve completed the sign-up process. If you do not sign up before the expiration date, you must request a new code. · Madefire Access Code: [email or web address] Expires: 8/27/2018  8:56 AM 
 
4. Enter the last four digits of your Social Security Number (xxxx) and Date of Birth (mm/dd/yyyy) as indicated and click Submit. You will be taken to the next sign-up page. 5. Create a Madefire ID. This will be your Madefire login ID and cannot be changed, so think of one that is secure and easy to remember. 6. Create a Madefire password. You can change your password at any time. 7. Enter your Password Reset Question and Answer. This can be used at a later time if you forget your password. 8. Enter your e-mail address. You will receive e-mail notification when new information is available in [address] 9. Click Sign Up. You can now view and download portions of your medical record. 10. Click the Download Summary menu link to download a portable copy of your medical information. If you have questions, please visit the Frequently Asked Questions section of the [employer] website. Remember, [employer] is NOT to be used for urgent needs. For medical emergencies, dial 911. Now available from your iPhone and Android! Introducing Jed Butterfield As a [employer] patient, I wanted to make you aware of our electronic visit tool called Jed Butterfield. [employer] 24/7 allows you to connect within minutes with a medical provider 24 hours a day, seven days a week via a mobile device or tablet or logging into a secure website from your computer. You can access Jed Butterfield from anywhere in the [place]. A virtual visit might be right for you when you have a simple condition and feel like you just dont want to get out of bed, or cant get away from work for an appointment, when your regular [employer] provider is not available (evenings, weekends or holidays), or when youre out of town and need minor care. Electronic visits cost only $49 and if the [employer] 24/7 provider determines a prescription is needed to treat your condition, one can be electronically transmitted to a nearby pharmacy*. Please take a moment to enroll today if you have not already done so. The enrollment process is free and takes just a few minutes. To enroll, please download the [employer] 24/7 jono to your tablet or phone, or visit [email or web address] to enroll on your computer.    
And, as an [address] [place] [address] patient with a [employer] account, the results of your visits will be scanned into your electronic medical record and your primary care provider will be able to view the scanned results. We urge you to continue to see your regular [employer] provider for your ongoing medical care. And while your primary care provider may not be the one available when you seek a Jed Gonzalesfin virtual visit, the peace of mind you get from getting a real diagnosis real time can be priceless. For more information on Jed [employer]Med[employer]rahamfin, view our Frequently Asked Questions (FAQs) at [email or web address]. Sincerely, 
 
Tonio Vargas MD 
Chief Medical Officer Hilton Sanabria *:  certain medications cannot be prescribed via My [facility]I Unresulted tests-please follow up with your PCP on these results Procedure/Test Authorizing Provider CBC W/O DIFF Michael Hammer MD  
 EKG, 12 LEAD, INITIAL Michael Hammer MD  
 METABOLIC PANEL, Christiana Klein MD  
  
Providers Seen During Your Hospitalization Provider Specialty Primary office phone Michael Hammer MD Orthopedic Surgery [phone] Your Primary Care Physician (PCP) Primary Care Physician Office Phone Office Fax [place] Cornea [phone] You are allergic to the following No active allergies Recent Documentation Height Weight BMI Smoking Status 1.803 m 97.6 kg 30 kg/m2 Current Some Day Smoker Emergency Contacts Name Discharge Info Relation Home Work Mobile Alen Rizoheron DISCHARGE CAREGIVER [3] Girlfriend [18]   [phone] Patient Belongings  The following personal items are in your possession at time of discharge: 
  Dental Appliances:  (wtih Cordellia)  Visual Aid: Glasses      Home Medications: None   Jewelry: None  Clothing: Undergarments, Footwear, Socks, Shirt, Pants (with Cordellia)    Other Valuables: Eyeglasses, Jeovanny Copeland (comment) (and overnight bag with toiletries with Cordellia) Please provide this summary of care documentation to your next provider. Signatures-by signing, you are acknowledging that this After Visit Summary has been reviewed with you and you have received a copy. Patient Signature:  ____________________________________________________________ Date:  ____________________________________________________________  
  
Lavanda Ferries Provider Signature:  ____________________________________________________________ Date:  ____________________________________________________________

## 2018-06-26 LAB
ANION GAP SERPL CALC-SCNC: 7 MMOL/L (ref 3–18)
BUN SERPL-MCNC: 14 MG/DL (ref 7–18)
BUN/CREAT SERPL: 11 (ref 12–20)
CALCIUM SERPL-MCNC: 7.9 MG/DL (ref 8.5–10.1)
CHLORIDE SERPL-SCNC: 101 MMOL/L (ref 100–108)
CO2 SERPL-SCNC: 29 MMOL/L (ref 21–32)
CREAT SERPL-MCNC: 1.22 MG/DL (ref 0.6–1.3)
ERYTHROCYTE [DISTWIDTH] IN BLOOD BY AUTOMATED COUNT: 15.7 % (ref 11.6–14.5)
GLUCOSE SERPL-MCNC: 146 MG/DL (ref 74–99)
HCT VFR BLD AUTO: 31.7 % (ref 36–48)
HGB BLD-MCNC: 10.4 G/DL (ref 13–16)
MCH RBC QN AUTO: 24.4 PG (ref 24–34)
MCHC RBC AUTO-ENTMCNC: 32.8 G/DL (ref 31–37)
MCV RBC AUTO: 74.2 FL (ref 74–97)
PLATELET # BLD AUTO: 129 K/UL (ref 135–420)
POTASSIUM SERPL-SCNC: 4 MMOL/L (ref 3.5–5.5)
RBC # BLD AUTO: 4.27 M/UL (ref 4.7–5.5)
SODIUM SERPL-SCNC: 137 MMOL/L (ref 136–145)
WBC # BLD AUTO: 8.5 K/UL (ref 4.6–13.2)

## 2018-06-26 PROCEDURE — 80048 BASIC METABOLIC PNL TOTAL CA: CPT | Performed by: ORTHOPAEDIC SURGERY

## 2018-06-26 PROCEDURE — 97166 OT EVAL MOD COMPLEX 45 MIN: CPT

## 2018-06-26 PROCEDURE — 36415 COLL VENOUS BLD VENIPUNCTURE: CPT | Performed by: ORTHOPAEDIC SURGERY

## 2018-06-26 PROCEDURE — 85027 COMPLETE CBC AUTOMATED: CPT | Performed by: ORTHOPAEDIC SURGERY

## 2018-06-26 PROCEDURE — 97110 THERAPEUTIC EXERCISES: CPT

## 2018-06-26 PROCEDURE — 97116 GAIT TRAINING THERAPY: CPT

## 2018-06-26 PROCEDURE — 74011258636 HC RX REV CODE- 258/636: Performed by: ORTHOPAEDIC SURGERY

## 2018-06-26 PROCEDURE — 65270000029 HC RM PRIVATE

## 2018-06-26 PROCEDURE — 74011250636 HC RX REV CODE- 250/636: Performed by: ORTHOPAEDIC SURGERY

## 2018-06-26 PROCEDURE — 74011250637 HC RX REV CODE- 250/637: Performed by: ORTHOPAEDIC SURGERY

## 2018-06-26 RX ORDER — CYCLOBENZAPRINE HCL 10 MG
10 TABLET ORAL
Status: DISCONTINUED | OUTPATIENT
Start: 2018-06-26 | End: 2018-06-27 | Stop reason: HOSPADM

## 2018-06-26 RX ADMIN — OXYCODONE HYDROCHLORIDE 5 MG: 5 TABLET ORAL at 21:19

## 2018-06-26 RX ADMIN — OXYCODONE HYDROCHLORIDE 5 MG: 5 TABLET ORAL at 08:08

## 2018-06-26 RX ADMIN — DOCUSATE SODIUM 100 MG: 100 CAPSULE, LIQUID FILLED ORAL at 21:19

## 2018-06-26 RX ADMIN — OXYCODONE HYDROCHLORIDE 5 MG: 5 TABLET ORAL at 12:18

## 2018-06-26 RX ADMIN — SODIUM CHLORIDE, SODIUM LACTATE, POTASSIUM CHLORIDE, CALCIUM CHLORIDE, AND DEXTROSE MONOHYDRATE 75 ML/HR: 600; 310; 30; 20; 5 INJECTION, SOLUTION INTRAVENOUS at 04:37

## 2018-06-26 RX ADMIN — DOCUSATE SODIUM 100 MG: 100 CAPSULE, LIQUID FILLED ORAL at 08:05

## 2018-06-26 RX ADMIN — OXYCODONE HYDROCHLORIDE 5 MG: 5 TABLET ORAL at 17:35

## 2018-06-26 RX ADMIN — KETOROLAC TROMETHAMINE 15 MG: 15 INJECTION, SOLUTION INTRAMUSCULAR; INTRAVENOUS at 12:18

## 2018-06-26 RX ADMIN — TRAMADOL HYDROCHLORIDE 100 MG: 50 TABLET, FILM COATED ORAL at 19:46

## 2018-06-26 RX ADMIN — SERTRALINE HYDROCHLORIDE 25 MG: 50 TABLET ORAL at 08:06

## 2018-06-26 RX ADMIN — RANITIDINE 300 MG: 150 TABLET ORAL at 08:05

## 2018-06-26 RX ADMIN — CALCIUM POLYCARBOPHIL 625 MG: 625 TABLET, FILM COATED ORAL at 08:05

## 2018-06-26 RX ADMIN — KETOROLAC TROMETHAMINE 15 MG: 15 INJECTION, SOLUTION INTRAMUSCULAR; INTRAVENOUS at 05:06

## 2018-06-26 RX ADMIN — OXYCODONE HYDROCHLORIDE 5 MG: 5 TABLET ORAL at 03:50

## 2018-06-26 RX ADMIN — TRAMADOL HYDROCHLORIDE 100 MG: 50 TABLET, FILM COATED ORAL at 08:08

## 2018-06-26 RX ADMIN — CYCLOBENZAPRINE HYDROCHLORIDE 10 MG: 10 TABLET, FILM COATED ORAL at 13:39

## 2018-06-26 RX ADMIN — ATORVASTATIN CALCIUM 20 MG: 20 TABLET, FILM COATED ORAL at 21:19

## 2018-06-26 RX ADMIN — TRAMADOL HYDROCHLORIDE 100 MG: 50 TABLET, FILM COATED ORAL at 13:39

## 2018-06-26 RX ADMIN — Medication 1 MG: at 22:20

## 2018-06-26 RX ADMIN — Medication 10 ML: at 13:40

## 2018-06-26 RX ADMIN — Medication 10 ML: at 21:19

## 2018-06-26 RX ADMIN — TRAMADOL HYDROCHLORIDE 100 MG: 50 TABLET, FILM COATED ORAL at 01:47

## 2018-06-26 RX ADMIN — CLOPIDOGREL BISULFATE 75 MG: 75 TABLET ORAL at 08:06

## 2018-06-26 RX ADMIN — Medication 2 G: at 01:47

## 2018-06-26 NOTE — PROGRESS NOTES
Problem: Falls - Risk of  Goal: *Absence of Falls  Document Patricia Fall Risk and appropriate interventions in the flowsheet.    Outcome: Progressing Towards Goal  Fall Risk Interventions:  Mobility Interventions: Assess mobility with egress test, Utilize walker, cane, or other assitive device, Patient to call before getting OOB, OT consult for ADLs, PT Consult for mobility concerns         Medication Interventions: Assess postural VS orthostatic hypotension, Teach patient to arise slowly, Patient to call before getting OOB    Elimination Interventions: Elevated toilet seat, Call light in reach, Toileting schedule/hourly rounds    History of Falls Interventions: Door open when patient unattended, Investigate reason for fall

## 2018-06-26 NOTE — PROGRESS NOTES
Transition of Care (YVON) Plan:     Chart reviewed, met with pt and wife in room. Pt plans discharge home, wife will be home to assist. [address] offered, pt chose [facility] [phone] for follow up; referral placed with CMS. Pt has RW for home. YVNO Transportation:   How is patient being transported at discharge? Family/Friend      When? Once cleared by Therapy between 12-2pm     Is transport scheduled? N/A      Follow-up appointment and transportation:   PCP/Specialist?  See AVS for Appointment         Who is transporting to the follow-up appointment? Family/Friend      Is transport for follow up appointment scheduled? N/A    Communication plan (with patient/family): Who is being called? Patient or Next of Kin? Responsible party? Patient      What number(s) is to be used? See Facesheet      What service provider is calling for [address] services? When are they calling? 24-48 hours following discharge    Readmission Risk? (Green/Low; Yellow/Moderate; Red/High):  Green    Care Management Interventions  PCP Verified by CM:  Yes  Transition of Care Consult (CM Consult): [address]: No  [facility] Patient already serviced by other home care/hospice agency [facility])  Discharge Durable Medical Equipment: No  Physical Therapy Consult: Yes  Occupational Therapy Consult: Yes  Current Support Network: Lives with Spouse, Own Home  Confirm Follow Up Transport: Family  Plan discussed with Pt/Family/Caregiver: Yes  Freedom of Choice Offered: Yes  Discharge Location  Discharge Placement: Home with home health

## 2018-06-26 NOTE — PROGRESS NOTES
Problem: Falls - Risk of  Goal: *Absence of Falls  Document Patricia Fall Risk and appropriate interventions in the flowsheet.    Outcome: Progressing Towards Goal  Fall Risk Interventions:  Mobility Interventions: Patient to call before getting OOB, Utilize walker, cane, or other assitive device         Medication Interventions: Assess postural VS orthostatic hypotension, Patient to call before getting OOB, Teach patient to arise slowly    Elimination Interventions: Call light in reach, Patient to call for help with toileting needs, Urinal in reach    History of Falls Interventions: Door open when patient unattended, Investigate reason for fall

## 2018-06-26 NOTE — PROGRESS NOTES
Problem: Mobility Impaired (Adult and Pediatric)  Goal: *Acute Goals and Plan of Care (Insert Text)  In 1-7 days pt will be able to perform:  ST.  Bed mobility:  Rolling L to R to L modified independent for positioning. 2.  Supine to sit to supine S with HR for meals. 3.  Sit to stand to sit S with RW in prep for ambulation. LT.  Gait:  Ambulate >150ft S with RW, WBAT, for home/community mobility. 2.  Stair Negotiation:  Ascend/descend >5 steps CGA with HR for home entry. 3.  Activity tolerance: Tolerate up in chair 1-2 hours for ADLs. 4.  Patient/Family Education:  Patient/family to be independent with HEP for follow-up care and safe discharge. Pt refused PT due to:    []  Nausea/vomiting  []  Eating  []  Pain  []  Pt lethargic  [x]  Pt LE draining serosanguineous fluid. Mina Holcomb RN in the room to reinforce dressing. Will f/u later as schedule allows. Thank you.     Shelly Herring PT, DPT

## 2018-06-26 NOTE — PROGRESS NOTES
Problem: Mobility Impaired (Adult and Pediatric)  Goal: *Acute Goals and Plan of Care (Insert Text)  In 1-7 days pt will be able to perform:  ST.  Bed mobility:  Rolling L to R to L modified independent for positioning. 2.  Supine to sit to supine S with HR for meals. 3.  Sit to stand to sit S with RW in prep for ambulation. LT.  Gait:  Ambulate >150ft S with RW, WBAT, for home/community mobility. 2.  Stair Negotiation:  Ascend/descend >5 steps CGA with HR for home entry. 3.  Activity tolerance: Tolerate up in chair 1-2 hours for ADLs. 4.  Patient/Family Education:  Patient/family to be independent with HEP for follow-up care and safe discharge. Pt not seen by PT due to:    []  Nausea/vomiting  []  Eating  []  Pain  [x]  Pt lethargic; just got back to bed and requested time to rest.  []  Off Unit  Will f/u later as schedule allows. Thank you.     Shelly Herring PT, DPT

## 2018-06-26 NOTE — PROGRESS NOTES
Problem: Falls - Risk of  Goal: *Absence of Falls  Document Patricia Fall Risk and appropriate interventions in the flowsheet.    Outcome: Progressing Towards Goal  Fall Risk Interventions:  Mobility Interventions: Patient to call before getting OOB         Medication Interventions: Patient to call before getting OOB    Elimination Interventions: Patient to call for help with toileting needs    History of Falls Interventions: Door open when patient unattended, Investigate reason for fall

## 2018-06-26 NOTE — PROGRESS NOTES
POD1  Alert  VSS  N/V intact  Hgb good  Right knee dressing in place  PT improving; not cleared  Plan : Dressing change, PT: Home weds

## 2018-06-26 NOTE — PROGRESS NOTES
Problem: Mobility Impaired (Adult and Pediatric)  Goal: *Acute Goals and Plan of Care (Insert Text)  In 1-7 days pt will be able to perform:  ST.  Bed mobility:  Rolling L to R to L modified independent for positioning. 2.  Supine to sit to supine S with HR for meals. 3.  Sit to stand to sit S with RW in prep for ambulation. LT.  Gait:  Ambulate >150ft S with RW, WBAT, for home/community mobility. 2.  Stair Negotiation:  Ascend/descend >5 steps CGA with HR for home entry. 3.  Activity tolerance: Tolerate up in chair 1-2 hours for ADLs. 4.  Patient/Family Education:  Patient/family to be independent with HEP for follow-up care and safe discharge. physical Therapy TREATMENT    Patient: Belle Chris (61 y.o. male)  Date: 2018  Diagnosis: RIGHT KNEE DEGENERATIVE JOINT DISEASE,HYPERTENSION,HYPERLIPIDEMIA  DJD (degenerative joint disease) of knee <principal problem not specified>  Procedure(s) (LRB):  RIGHT TOTAL KNEE REPLACEMENT W/CONFORMIS (Right) 1 Day Post-Op  Precautions: Fall, WBAT   Chart, physical therapy assessment, plan of care and goals were reviewed. ASSESSMENT:  Pt making good progress toward PT goals at this time. Pt did require SBA for bed mobility and supervision level assist for functional transfers. During gait training pt ambulated [facility] distances with RW/GB use with supervision level assist with increased knee flexion during all phases of gait thus required moderate verbal cuing for proper heel strike instead of forefoot strike. Left pt in bed with all needs met, call bell within reach, ice pack to R knee and flexi pumps in place. PT will focus on transfer training, gait training and stair training for safe return to home.     Progression toward goals:  []      Improving appropriately and progressing toward goals  [x]      Improving slowly and progressing toward goals  []      Not making progress toward goals and plan of care will be adjusted     PLAN:  Patient continues to benefit from skilled intervention to address the above impairments. Continue treatment per established plan of care. Discharge Recommendations:  Home Health  Further Equipment Recommendations for Discharge:  rolling walker     SUBJECTIVE:   Patient stated I am doing ok, my leg feels really stiff and swollen.     OBJECTIVE DATA SUMMARY:   Critical Behavior:  Neurologic State: Alert, Appropriate for age  Orientation Level: Appropriate for age, Oriented X4  Cognition: Follows commands  Safety/Judgement: Awareness of environment, Fall prevention, Good awareness of safety precautions, Insight into deficits  Functional Mobility Training:  Bed Mobility:   Supine to Sit: Stand-by assistance  Sit to Supine: Stand-by assistance  Scooting: Stand-by assistance   Transfers:  Sit to Stand: Supervision; Additional time  Stand to Sit: Supervision; Additional time  Balance:  Sitting: Intact  Standing: Intact; With support  Ambulation/Gait Training:  Distance (ft): 160 Feet (ft)  Assistive Device: Walker, rolling;Gait belt  Ambulation - Level of Assistance: Supervision   Gait Abnormalities: Antalgic;Decreased step clearance  Right Side Weight Bearing: As tolerated   Base of Support: Shift to left  Stance: Right decreased; Left increased  Speed/Suad: Pace decreased (<100 feet/min)  Step Length: Right shortened;Left shortened  Swing Pattern: Right asymmetrical;Left asymmetrical   Interventions: Visual/Demos; Verbal cues; Tactile cues; Safety awareness training   Therapeutic Exercises:   HEP included ankle pumps, heel slides, quad sets, AAROM SLR, LAQ, knee flexion and seated marches x 10 reps  Pain:  Pain Scale 1: Numeric (0 - 10)  Pain Intensity 1: 7  Pain Location 1: Knee  Pain Orientation 1: Right; Anterior  Pain Description 1: Aching  Pain Intervention(s) 1: Cold pack; Rest;Repositioned  Activity Tolerance:   Fair+  Please refer to the flowsheet for vital signs taken during this treatment.   After treatment:   [] Patient left in no apparent distress sitting up in chair  [x] Patient left in no apparent distress in bed  [x] Call bell left within reach  [x] Nursing notified  [x] Caregiver present  [] Bed alarm activated      Amanda Herring PT, DPT       Time Calculation: 15 mins

## 2018-06-26 NOTE — PROGRESS NOTES
0725 Assumed care of pt at this time. Pt in bed with no signs of distress. Pt left with call light within reach and encouraged to call for assistance. 4870 Head to toe assessment completed at this time  Patient is A&OX4. Pt denies N/V chest pain and SOB or distress. Pt is calm and cooperative. Pedal pulses are present. Capillary refill less than 3 seconds. Skin in warm and dry with ACE wrap dressing on right knee and is CDI. Lungs are clear bilaterally. Patient instructed on use and reason for incentive spirometer. Bowel sounds are active. Abdomen is soft and non-tender. YUSUF on LLE & Plexi in place bilaterally . Positive dorsalis pedis pulse, sensation, warm. No tingling or numbness to lower extremities. 18 g needle in the Rt hand. Pain scale explained to patient. Reasons for taking PRN meds explained to patient. Patient instructed to call for prn when needed. Pain level is 7, medicated as per MAR. Patient was oriented to call bell and bed function. Will continue to monitor    0920 Reinforced dressing with ACE wrap and placed ice pack, left the pt in comfortable condition. 1218 Pt state pain as 8/10. Pt received medication as per MAR. Potential medication side effects explained to patient, patient verbalizes understanding, opportunities for questions provided. Patient stable, no apparent distress at this time, bed in locked position, call bell within reach. 1339 Pt state pain as 7/10. Pt received medication as per MAR. Potential medication side effects explained to patient, patient verbalizes understanding, opportunities for questions provided. Patient stable, no apparent distress at this time, bed in locked position, call bell within reach. 1735 Pt state pain as 8/10. Pt received medication as per MAR. Potential medication side effects explained to patient, patient verbalizes understanding, opportunities for questions provided.   Patient stable, no apparent distress at this time, bed in locked position, call bell within reach. Shift summary: Patient had uneventful shift. Patient ambulated with assistance using walker. Pain remained well-controlled with medication.  No issues/concerns at this time

## 2018-06-26 NOTE — PROGRESS NOTES
20:00 Assessment completed. Lungs are clear bilat. but are decreased in the bases. Ace wrap on RLE remains C/D/I with + CMS & + PP. Denies pain or discomfort in calves. Ice pack was refilled & re-applied. Resting quietly in bed with visitor @ bedside. 21:00 Ace wrap on RLE re-enforced with ABD pads x2 & ace wrap.    22:30 Shift assessment completed. See ns flow sheet for details. 02:50 Reassessed with 0 changes noted. Ace wrap on RLE remains C/D/I with + CMS & + PP. Ice pack was refilled & re-applied. Resting quietly in bed with eyes closed between cares x for voiding per urinal w/o difficulty. Girlfriend remains @ bedside. 07:25 Bedside and Verbal shift change report given to Robert Mercedes RN (oncoming nurse) by Jesi Schmitz RN (offgoing nurse). Report included the following information SBAR.

## 2018-06-26 NOTE — PROGRESS NOTES
1936 -Assumed care of pt at this time. Pt in bed with no signs of distress. Pt left with call light within reach and encouraged to call for assistance. 1947 -  Head to toe assessment performed at this time. Pt denies any chest pain or SOB. Pt denies any numbness or tingling to extremities. Pt encouraged to manage pain and to use the incentive spirometer. Pt educated on the side effects of medications taken. Pt left with call light within reach and bed in low position. Will continue to monitor. 2220 - Pt state pain as 10/10. Pt received medication per MAR and ice. Pt informed of the side effects of the medication and the next time medication can be given. Pt encouraged to call for assistance and to manage pain. Pt encouraged to use incentive spirometer. Pt left with call light within reach, bed in low position and wheels locked. Will continue to monitor.

## 2018-06-26 NOTE — ROUTINE PROCESS
Bedside shift change report given to Floridalma SIMON Rn (oncoming nurse) by Anu SOLANO RN (offgoing nurse). Report included the following information SBAR, Kardex and MAR.

## 2018-06-26 NOTE — PROGRESS NOTES
Problem: Mobility Impaired (Adult and Pediatric)  Goal: *Acute Goals and Plan of Care (Insert Text)  In 1-7 days pt will be able to perform:  ST.  Bed mobility:  Rolling L to R to L modified independent for positioning. 2.  Supine to sit to supine S with HR for meals. 3.  Sit to stand to sit S with RW in prep for ambulation. LT.  Gait:  Ambulate >150ft S with RW, WBAT, for home/community mobility. 2.  Stair Negotiation:  Ascend/descend >5 steps CGA with HR for home entry. 3.  Activity tolerance: Tolerate up in chair 1-2 hours for ADLs. 4.  Patient/Family Education:  Patient/family to be independent with HEP for follow-up care and safe discharge. physical Therapy TREATMENT    Patient: Valeria Marie (53 y.o. male)  Date: 2018  Diagnosis: RIGHT KNEE DEGENERATIVE JOINT DISEASE,HYPERTENSION,HYPERLIPIDEMIA  DJD (degenerative joint disease) of knee <principal problem not specified>  Procedure(s) (LRB):  RIGHT TOTAL KNEE REPLACEMENT W/CONFORMIS (Right) 1 Day Post-Op  Precautions: Fall, WBAT   Chart, physical therapy assessment, plan of care and goals were reviewed. ASSESSMENT:  Pt motivated to participate with PT at this time with c/o 6/10 pain prior to PT and 7/10 pain post PT. During gait training pt ambulated 160 feet with RW/GB use with a slow/antalgic gait pattern with decreased heel strike requiring verbal cuing to perform heel-toe pattern. Left pt sitting up at the EOB with all needs met, call bell within reach and ice pack to R knee. Educated pt to NOT get up without nursing staff; pt and fiance verbalized understanding. Recommend HHPT at time of discharge. Progression toward goals:  []      Improving appropriately and progressing toward goals  [x]      Improving slowly and progressing toward goals  []      Not making progress toward goals and plan of care will be adjusted     PLAN:  Patient continues to benefit from skilled intervention to address the above impairments.   Continue treatment per established plan of care. Discharge Recommendations:  Home Health  Further Equipment Recommendations for Discharge:  rolling walker     SUBJECTIVE:   Patient stated I am feeling better.     OBJECTIVE DATA SUMMARY:   Critical Behavior:  Neurologic State: Alert, Appropriate for age  Orientation Level: Appropriate for age, Oriented X4  Cognition: Appropriate decision making, Appropriate safety awareness, Appropriate for age attention/concentration, Follows commands  Safety/Judgement: Awareness of environment, Fall prevention, Good awareness of safety precautions, Insight into deficits  Functional Mobility Training:  Bed Mobility:  Supine to Sit: Contact guard assistance; Additional time   Scooting: Supervision; Additional time   Transfers:  Sit to Stand: Supervision; Additional time  Stand to Sit: Supervision  Balance:  Sitting: Intact  Standing: Intact; With support  Ambulation/Gait Training:  Distance (ft): 160 Feet (ft)  Assistive Device: Walker, rolling;Gait belt  Ambulation - Level of Assistance: Supervision   Gait Abnormalities: Antalgic;Decreased step clearance  Right Side Weight Bearing: As tolerated   Base of Support: Shift to left  Stance: Right decreased; Left increased  Speed/Suad: Slow  Step Length: Right shortened;Left shortened  Swing Pattern: Right asymmetrical;Left asymmetrical   Interventions: Visual/Demos; Tactile cues; Verbal cues; Safety awareness training   Therapeutic Exercises:   HEP included ankle pumps, heel slides, marching, LAQ, quad sets x 10 reps  Pain:  Pain Scale 1: Numeric (0 - 10)  Pain Intensity 1: 6  Pain Location 1: Knee  Pain Orientation 1: Right  Pain Description 1: Aching  Pain Intervention(s) 1: Cold pack; Rest;Repositioned  Activity Tolerance:   Fair+/Good  Please refer to the flowsheet for vital signs taken during this treatment.   After treatment:   [] Patient left in no apparent distress sitting up in chair  [x] Patient left in no apparent distress in bed  [x] Call cook left within reach  [x] Nursing notified  [x] Caregiver present  [] Bed alarm activated      Carlo Herring PT, DPT     Time Calculation: 32 mins

## 2018-06-26 NOTE — PROGRESS NOTES
Problem: Self Care Deficits Care Plan (Adult)  Goal: *Acute Goals and Plan of Care (Insert Text)  Outcome: Resolved/Met Date Met: 06/26/18  Occupational Therapy EVALUATION/discharge    Patient: Robbi Sparks (83 y.o. male)  Date: 6/26/2018  Primary Diagnosis: RIGHT KNEE DEGENERATIVE JOINT Nichole Climjames  DJD (degenerative joint disease) of knee  Procedure(s) (LRB):  RIGHT TOTAL KNEE REPLACEMENT W/CONFORMIS (Right) 1 Day Post-Op   Precautions: R TKA,  Fall, WBAT    ASSESSMENT AND RECOMMENDATIONS:  Based on the objective data described below, the patient presents with ability to perform ADL tasks at baseline level. Patient and spouse in bed w/ pt agreeable to sitting up. Pt w/ significant knee flexion. Encouraged full upright standing w/ knee straightening. Pt instructed on adaptive strategies and RW mgmt. Noted shoulders hiked and RW adjusted w/ improved ability to safely move in room and improved knee extension. Spouse instructed on assisting w/ YUSUF hose w/ verbalizing understanding. Pt with no further OT/ADL concerns at this time. Education: Reviewed home safety, body mechanics, importance of moving every hour to prevent joint stiffness, role of ice for edema/pain control, Rolling Walker management/safety, and adaptive dressing techniques with patient verbalizing understanding at this time     Discharge Recommendations: Home Health  Further Equipment Recommendations for Discharge: rolling walker      SUBJECTIVE:   Patient stated I got my pants on already.     OBJECTIVE DATA SUMMARY:     Past Medical History:   Diagnosis Date    Acid reflux     Arthritis     CAD (coronary artery disease)     MI x 2 with stents    GERD (gastroesophageal reflux disease)     High cholesterol     Hypertension 2010     Past Surgical History:   Procedure Laterality Date    HX CORONARY STENT PLACEMENT      HX HEENT      teeth removal    HX ORTHOPAEDIC Right     knee open procedure after accident    NEUROLOGICAL PROCEDURE UNLISTED      lower back     Barriers to Learning/Limitations: yes;  cognitive  Compensate with: visual, verbal, tactile, kinesthetic cues/model    Prior Level of Function/Home Situation: Supportive spouse; architectural barriers of steps  Home Situation  Home Environment: Apartment  # Steps to Enter: 15  Rails to Enter: Yes  Hand Rails : Bilateral  One/Two Story Residence: One story  Living Alone: No  Support Systems: Spouse/Significant Other/Partner  Patient Expects to be Discharged to[de-identified] Private residence  Current DME Used/Available at Home: Crutches, Transfer bench  Tub or Shower Type: Tub/Shower combination  [x]     Right hand dominant   []     Left hand dominant    Cognitive/Behavioral Status:  Neurologic State: Alert  Orientation Level: Oriented X4  Cognition: Follows commands  Safety/Judgement: Awareness of environment;Decreased awareness of need for safety    Skin: R knee incision w/ Mepilex   Edema: R knee w/ mild edema, ACE and ice applied    Vision/Perceptual:     WFL      Coordination:  Coordination: Within functional limits  Fine Motor Skills-Upper: Left Intact; Right Intact    Gross Motor Skills-Upper: Left Intact; Right Intact    Balance:  Sitting: Intact  Standing: Intact; With support    Strength:  Strength: Generally decreased, functional  Tone & Sensation:  Tone: Normal  Sensation: Intact  Range of Motion:  AROM: Generally decreased, functional  PROM: Generally decreased, functional    Functional Mobility and Transfers for ADLs:  Bed Mobility:  Supine to Sit: Stand-by assistance  Sit to Supine: Stand-by assistance  Scooting: Stand-by assistance  Transfers:  Sit to Stand: Supervision   Toilet Transfer : Supervision   Bathroom Mobility: Supervision/set up;Contact guard assistance    ADL Assessment:  Feeding: Setup    Oral Facial Hygiene/Grooming: Contact guard assistance    Bathing: Contact guard assistance;Minimum assistance    Upper Body Dressing: Setup    Lower Body Dressing: Contact guard assistance;Minimum assistance; Additional time    Toileting: Supervision    ADL Intervention:  Feeding  Drink to Mouth: Supervision/set-up    Upper Body Dressing Assistance  Pullover Shirt: Supervision/set-up    Lower Body Dressing Assistance  Underpants: Stand-by assistance  Pants With Button/Zipper: Stand-by assistance  Socks: Contact guard assistance  Antiembolitic Stockings: Minimum assistance  Position Performed: Seated edge of bed;Bending forward method    Toileting  Toileting Assistance: Supervision/set up  Bladder Hygiene: Supervision/set-up  Clothing Management: Contact guard assistance    Cognitive Retraining  Problem Solving: Inductive reason; Identifying the task; Identifying the problem;General alternative solution;Deductive reason  Executive Functions: Executing cognitive plans  Organizing/Sequencing: Breaking task down;Prioritizing  Following Commands: Awareness of environment; Follows one step commands/directions  Safety/Judgement: Awareness of environment;Decreased awareness of need for safety  Cues: Tactile cues provided;Verbal cues provided;Visual cues provided    Pain:  Pre-treatment: 5/10  Post-treatment: 5/10    Activity Tolerance:   Patient able to stand 1-2 minute(s). Patient able to complete ADLs with frequent rest breaks. Patient limited by pain/ROM/strength. Patient unsteady     Please refer to the flowsheet for vital signs taken during this treatment. After treatment:   []  Patient left in no apparent distress sitting up in chair  [x]  Patient left in no apparent distress in bed  [x]  Call bell left within reach  [x]  Nursing notified  [x]  Caregiver present/spouse  []  Bed alarm activated    COMMUNICATION/EDUCATION:   Communication/Collaboration:  [x]      Home safety education was provided and the patient/caregiver indicated understanding. [x]      Patient/family have participated as able and agree with findings and recommendations.   []      Patient is unable to participate in plan of care at this time. Thank you for this referral.  Courtney Escobar, OTR/L  Time Calculation: 16 mins    G-Codes (GP)  Self Care   Current  CJ= 20-39%   Goal  CJ= 20-39%   D/C  CJ= 20-39%  The severity rating is based on the professional judgement & direct observation of Level of Assistance required for Functional Mobility and ADLs. Eval Complexity: History: HIGH Complexity : Extensive review of history including physical, cognitive and psychosocial history ; Examination: MEDIUM Complexity : 3-5 performance deficits relating to physical, cognitive , or psychosocial skils that result in activity limitations and / or participation restrictions; Decision Making:MEDIUM Complexity : Patient may present with comorbidities that affect occupational performnce.  Miniml to moderate modification of tasks or assistance (eg, physical or verbal ) with assesment(s) is necessary to enable patient to complete evaluation

## 2018-06-27 VITALS
SYSTOLIC BLOOD PRESSURE: 151 MMHG | WEIGHT: 215.13 LBS | HEART RATE: 90 BPM | DIASTOLIC BLOOD PRESSURE: 82 MMHG | HEIGHT: 71 IN | RESPIRATION RATE: 18 BRPM | BODY MASS INDEX: 30.12 KG/M2 | TEMPERATURE: 98.8 F | OXYGEN SATURATION: 96 %

## 2018-06-27 PROBLEM — M17.9 DJD (DEGENERATIVE JOINT DISEASE) OF KNEE: Status: RESOLVED | Noted: 2018-06-25 | Resolved: 2018-06-27

## 2018-06-27 PROCEDURE — 97530 THERAPEUTIC ACTIVITIES: CPT

## 2018-06-27 PROCEDURE — 97116 GAIT TRAINING THERAPY: CPT

## 2018-06-27 PROCEDURE — 74011250637 HC RX REV CODE- 250/637: Performed by: ORTHOPAEDIC SURGERY

## 2018-06-27 PROCEDURE — 74011250636 HC RX REV CODE- 250/636: Performed by: ORTHOPAEDIC SURGERY

## 2018-06-27 RX ORDER — OXYCODONE HYDROCHLORIDE 5 MG/1
10 TABLET ORAL
Qty: 60 TAB | Refills: 0 | Status: SHIPPED | OUTPATIENT
Start: 2018-06-27 | End: 2018-07-30

## 2018-06-27 RX ORDER — ACETAMINOPHEN 325 MG/1
650 TABLET ORAL
Qty: 120 TAB | Refills: 0 | Status: ON HOLD | OUTPATIENT
Start: 2018-06-27 | End: 2018-07-27

## 2018-06-27 RX ORDER — OXYCODONE HYDROCHLORIDE 5 MG/1
10 TABLET ORAL
Status: DISCONTINUED | OUTPATIENT
Start: 2018-06-27 | End: 2018-06-27 | Stop reason: HOSPADM

## 2018-06-27 RX ORDER — TRAMADOL HYDROCHLORIDE 50 MG/1
100 TABLET ORAL 4 TIMES DAILY
Qty: 60 TAB | Refills: 0 | Status: SHIPPED | OUTPATIENT
Start: 2018-06-27 | End: 2018-07-30

## 2018-06-27 RX ADMIN — SERTRALINE HYDROCHLORIDE 25 MG: 50 TABLET ORAL at 08:26

## 2018-06-27 RX ADMIN — OXYCODONE HYDROCHLORIDE 5 MG: 5 TABLET ORAL at 08:26

## 2018-06-27 RX ADMIN — DOCUSATE SODIUM 100 MG: 100 CAPSULE, LIQUID FILLED ORAL at 08:26

## 2018-06-27 RX ADMIN — CALCIUM POLYCARBOPHIL 625 MG: 625 TABLET, FILM COATED ORAL at 08:26

## 2018-06-27 RX ADMIN — RANITIDINE 300 MG: 150 TABLET ORAL at 08:26

## 2018-06-27 RX ADMIN — Medication 1 MG: at 14:41

## 2018-06-27 RX ADMIN — ACETAMINOPHEN 650 MG: 325 TABLET ORAL at 09:13

## 2018-06-27 RX ADMIN — OXYCODONE HYDROCHLORIDE 5 MG: 5 TABLET ORAL at 04:24

## 2018-06-27 RX ADMIN — CLOPIDOGREL BISULFATE 75 MG: 75 TABLET ORAL at 08:26

## 2018-06-27 RX ADMIN — TRAMADOL HYDROCHLORIDE 100 MG: 50 TABLET, FILM COATED ORAL at 14:35

## 2018-06-27 RX ADMIN — ENOXAPARIN SODIUM 40 MG: 40 INJECTION, SOLUTION INTRAVENOUS; SUBCUTANEOUS at 00:38

## 2018-06-27 RX ADMIN — Medication 1 MG: at 10:40

## 2018-06-27 RX ADMIN — ACETAMINOPHEN 650 MG: 325 TABLET ORAL at 00:38

## 2018-06-27 RX ADMIN — OXYCODONE HYDROCHLORIDE 5 MG: 5 TABLET ORAL at 00:38

## 2018-06-27 RX ADMIN — TRAMADOL HYDROCHLORIDE 100 MG: 50 TABLET, FILM COATED ORAL at 02:19

## 2018-06-27 RX ADMIN — LISINOPRIL 5 MG: 5 TABLET ORAL at 08:26

## 2018-06-27 NOTE — DISCHARGE INSTRUCTIONS
Knee Arthroscopy: What to Expect at Home  Your Recovery    Arthroscopy is a way to find problems and do surgery inside a joint without making a large cut (incision). Your doctor put a lighted tube with a tiny camera-called an arthroscope, or scope-and surgical tools through small incisions in your knee. You will feel tired for several days. Your knee will be swollen, and you may notice that your skin is a different color near the cuts (incisions). The swelling is normal and will start to go away in a few days. Keeping your leg higher than your heart will help with swelling and pain. You will probably need about 6 weeks to recover. If your doctor repaired damaged tissue, recovery will take longer. You may have to limit your activity until your knee strength and movement return to normal. You may also be in a physical rehabilitation (rehab) program.  You may be able to return to a desk job or your normal routine in a few days. But if you do physical labor, it may be as long as 2 months before you can return to work. This care sheet gives you a general idea about how long it will take for you to recover. But each person recovers at a different pace. Follow the steps below to get better as quickly as possible. How can you care for yourself at home? Activity  ? · Rest when you feel tired. Getting enough sleep will help you recover. Use pillows to raise your ankle and leg above the level of your heart. ? · Try to walk each day, after your doctor has said you can. Start by walking a little more than you did the day before. Bit by bit, increase the amount you walk. Walking boosts blood flow and helps prevent pneumonia and constipation. ? · You may have a brace or crutches or both. ? · Your doctor will tell you how often and how much you can move your leg and knee. ? · If you have a desk job, you may be able to return to work a few days after the surgery.  If you lift things or stand or walk a lot at work, it may be as long as 2 months before you can return. ? · You can take a shower 48 to 72 hours after surgery and clean the incisions with regular soap and water. Do not take a bath or soak your knee until your doctor says it is okay. ? · Ask your doctor when you can drive again. ? · If you had a repair of torn tissue, follow your doctor's instructions for lifting things or moving your knee. Diet  ? · You can eat your normal diet. If your stomach is upset, try bland, low-fat foods like plain rice, broiled chicken, toast, and yogurt. ? · Drink plenty of fluids, unless your doctor tells you not to. ? · You may notice that your bowel movements are not regular right after your surgery. This is common. Try to avoid constipation and straining with bowel movements. You may want to take a fiber supplement every day. If you have not had a bowel movement after a couple of days, ask your doctor about taking a mild laxative. Medicines  ? · Your doctor will tell you if and when you can restart your medicines. He or she will also give you instructions about taking any new medicines. ? · If you take blood thinners, such as warfarin (Coumadin), clopidogrel (Plavix), or aspirin, be sure to talk to your doctor. He or she will tell you if and when to start taking those medicines again. Make sure that you understand exactly what your doctor wants you to do. ? · Be safe with medicines. Take pain medicines exactly as directed. ¨ If the doctor gave you a prescription medicine for pain, take it as prescribed. ¨ If you are not taking a prescription pain medicine, ask your doctor if you can take an over-the-counter medicine. ? · If you think your pain medicine is making you sick to your stomach:  ¨ Take your medicine after meals (unless your doctor has told you not to). ¨ Ask your doctor for a different pain medicine. ? · If your doctor prescribed antibiotics, take them as directed.  Do not stop taking them just because you feel better. You need to take the full course of antibiotics. Incision care  ? · If you have a dressing over your cuts (incisions), keep it clean and dry. You may remove it 48 to 72 hours after the surgery. ? · If your incisions are open to the air, keep the area clean and dry. ? · If you have strips of tape on the incisions, leave the tape on for a week or until it falls off. Exercise  ? · Move your toes and ankle as much as your bandages will allow. ? · Bend and straighten your knee slowly several times during the day. ? · Depending on why you had the surgery, you may have to do ankle and leg exercises. Your doctor or physical therapist will give you exercises as part of a rehabilitation program.   ? · Stop any activity that causes sharp pain. Talk to your doctor or physical therapist about what sports or other exercise you can do. Ice and elevation  ? · To reduce swelling and pain, put ice or a cold pack on your knee for 10 to 20 minutes at a time. Do this every 1 to 2 hours. Put a thin cloth between the ice and your skin. Follow-up care is a key part of your treatment and safety. Be sure to make and go to all appointments, and call your doctor if you are having problems. It's also a good idea to know your test results and keep a list of the medicines you take. When should you call for help? Call 911 anytime you think you may need emergency care. For example, call if:  ? · You passed out (lost consciousness). ? · You have severe trouble breathing. ? · You have sudden chest pain and shortness of breath, or you cough up blood. ?Call your doctor now or seek immediate medical care if:  ? · Your foot or toes are numb or tingling. ? · Your foot is cool or pale, or it changes color. ? · You have signs of a blood clot, such as:  ¨ Pain in your calf, back of the knee, thigh, or groin. ¨ Redness and swelling in your leg or groin.    ? · You are sick to your stomach or cannot keep fluids down.   ? · You have pain that does not get better after you take pain medicine. ? · You have loose stitches, or your incision comes open. ? · Bright red blood has soaked through the bandage over your incision. ? · You have signs of infection, such as:  ¨ Increased pain, swelling, warmth, or redness. ¨ Red streaks leading from the incisions. ¨ Pus draining from the incisions. ¨ A fever. ? Watch closely for any changes in your health, and be sure to contact your doctor if:  ? · You do not have a bowel movement after taking a laxative. Where can you learn more? Go to http://nathaly-meghana.info/. Enter A956 in the search box to learn more about \"Knee Arthroscopy: What to Expect at Home. \"  Current as of: March 21, 2017  Content Version: 11.4  © 6385-3377 [employer]. Care instructions adapted under license by [employer] (which disclaims liability or warranty for this information). If you have questions about a medical condition or this instruction, always ask your healthcare professional. [address] any warranty or liability for your use of this information.

## 2018-06-27 NOTE — PROGRESS NOTES
Problem: Mobility Impaired (Adult and Pediatric)  Goal: *Acute Goals and Plan of Care (Insert Text)  In 1-7 days pt will be able to perform:  ST.  Bed mobility:  Rolling L to R to L modified independent for positioning. 2.  Supine to sit to supine S with HR for meals. 3.  Sit to stand to sit S with RW in prep for ambulation. LT.  Gait:  Ambulate >150ft S with RW, WBAT, for home/community mobility. 2.  Stair Negotiation:  Ascend/descend >5 steps CGA with HR for home entry. 3.  Activity tolerance: Tolerate up in chair 1-2 hours for ADLs. 4.  Patient/Family Education:  Patient/family to be independent with HEP for follow-up care and safe discharge. Attempted PT session this morning however pt just received pain med and is requesting PT to return later. Will return as pt schedule allows and is willing to participate. Nurse Razia aware and present.

## 2018-06-27 NOTE — DISCHARGE SUMMARY
Discharge Summary    Patient: Magen Arrington               Sex: male          DOA: 6/25/2018         YOB: 1960      Age:  63 y.o.        LOS:  LOS: 2 days                Admit Date: 6/25/2018    Discharge Date: 6/27/2018    Admission Diagnoses: RIGHT KNEE DEGENERATIVE JOINT Dasia Martínez  DJD (degenerative joint disease) of knee    Discharge Diagnoses:    Problem List as of 6/27/2018  Date Reviewed: 6/27/2018          Codes Class Noted - Resolved    RESOLVED: DJD (degenerative joint disease) of knee ICD-10-CM: M17.10  ICD-9-CM: 715.36  6/25/2018 - 6/27/2018              Discharge Condition: Good    Hospital Course: tkr    Consults: None    Significant Diagnostic Studies: none    Discharge Medications:     Current Discharge Medication List      START taking these medications    Details   !! acetaminophen (TYLENOL) 325 mg tablet Take 2 Tabs by mouth every four (4) hours as needed. Qty: 120 Tab, Refills: 0    Associated Diagnoses: Primary osteoarthritis of both knees      oxyCODONE IR (ROXICODONE) 5 mg immediate release tablet Take 2 Tabs by mouth every four (4) hours as needed. Max Daily Amount: 60 mg.  Qty: 60 Tab, Refills: 0    Associated Diagnoses: Primary osteoarthritis of both knees      traMADol (ULTRAM) 50 mg tablet Take 2 Tabs by mouth four (4) times daily. Max Daily Amount: 400 mg. Qty: 60 Tab, Refills: 0    Associated Diagnoses: Primary osteoarthritis of both knees       ! ! - Potential duplicate medications found. Please discuss with provider. CONTINUE these medications which have NOT CHANGED    Details   !! acetaminophen (TYLENOL) 325 mg tablet Take  by mouth every four (4) hours as needed for Pain. lisinopril (PRINIVIL, ZESTRIL) 5 mg tablet Take 5 mg by mouth daily. raNITIdine (ZANTAC) 300 mg tablet Take 300 mg by mouth daily. clonazePAM (KLONOPIN) 0.5 mg tablet Take  by mouth nightly as needed.       sertraline (ZOLOFT) 25 mg tablet Take 25 mg by mouth daily. calcium polycarbophil (FIBER-TABS) 625 mg tablet Take 625 mg by mouth daily. clopidogrel (PLAVIX) 75 mg tab Take 75 mg by mouth. atorvastatin (LIPITOR) 20 mg tablet Take 20 mg by mouth nightly. nitroglycerin (NITROSTAT) 0.4 mg SL tablet 0.4 mg by SubLINGual route every five (5) minutes as needed for Chest Pain. !! - Potential duplicate medications found. Please discuss with provider.           Activity: Activity as tolerated    Diet: Regular Diet    Wound Care: Keep wound clean and dry    Follow-up: 2 weeks

## 2018-06-27 NOTE — PROGRESS NOTES
[address] 86 care of pt at this time. Assessment complete. Pt alert and oriented x 4. Denies SOB and chest pain. Pt lungs clear bilaterally. Cap refill  less than 3 seconds. Pt denies numbness and tingling to all extremities. Stated pain 8/10. Pt has 18 G IV to right hand. Pt has ACE bandage dressing to right knee CDI. Plexis bilaterally to feet and TEDs in place to LLE. Pt encouraged to continue use of IS. Pt verbalized understanding. Ice pack applied. Call light and possessions within reach. Bed in low position. Will continue to monitor. 1  Pt spouse concerned pt \"feels hot\". Temp taken axillary 99.2 Tylenol given, nurse educated pt on purpose and significance of using Incentive spirometer. Pt and spouse verbalized understanding. Pain is not being managed with 5mg of oxycodone nurse will call physician and make him aware. Will recommend dilaudid PO    0922  Paged Dr Majo Damon at this time to inform him that oxycodone is not managing his pain. Pt has had IV dilaudid one time last night and he said it helped. Nurse will suggest dilaudid PO for pain control. 1048  ACE bandage removed. Incision site intact. Knee slightly swollen with few blisters. No s/s of infection. ABD pad and YUSUF hose applied to RLE    1236  Nurse aware that pt has cleared PT at this time. Awaiting for rolling walker to be delivered    1237  Pt states dilaudid works better to control his pain. Nurse will contact Dr Majo Damon to see if pres. Can be changed. 56  Dr Majo Damon paged at this time. In regard for pain medication script. 1  Pt states he is ok going home with oxycodone does not want it changed. 1433  Dual AVS reviewed with Mariam Whittnigton RN. All medications reviewed individually with patient. Opportunities for questions and concerns provided. Patient to be discharged via (mode of transport ie. Car, ambulance or air transport) car. Patient's arm band appropriately discarded.

## 2018-06-27 NOTE — PROGRESS NOTES
Problem: Falls - Risk of  Goal: *Absence of Falls  Document Patricia Fall Risk and appropriate interventions in the flowsheet.    Outcome: Progressing Towards Goal  Fall Risk Interventions:  Mobility Interventions: Utilize walker, cane, or other assitive device, PT Consult for assist device competence, PT Consult for mobility concerns, Patient to call before getting OOB, Communicate number of staff needed for ambulation/transfer         Medication Interventions: Teach patient to arise slowly, Patient to call before getting OOB    Elimination Interventions: Call light in reach, Patient to call for help with toileting needs    History of Falls Interventions: Door open when patient unattended, Investigate reason for fall

## 2018-06-27 NOTE — PROGRESS NOTES
Pt ready for discharge, told RN he was waiting on walker to be delivered to room. CM spoke with pt, who had indicated yesterday when CM met with him and fiance that he has walker. RW has not been ordered, pt states he can obtain walker for home. [facility] aware pt discharging home today.

## 2018-06-27 NOTE — PROGRESS NOTES
Problem: Mobility Impaired (Adult and Pediatric)  Goal: *Acute Goals and Plan of Care (Insert Text)  In 1-7 days pt will be able to perform:  ST.  Bed mobility:  Rolling L to R to L modified independent for positioning. 2.  Supine to sit to supine S with HR for meals. 3.  Sit to stand to sit S with RW in prep for ambulation. LT.  Gait:  Ambulate >150ft S with RW, WBAT, for home/community mobility. 2.  Stair Negotiation:  Ascend/descend >5 steps CGA with HR for home entry. 3.  Activity tolerance: Tolerate up in chair 1-2 hours for ADLs. 4.  Patient/Family Education:  Patient/family to be independent with HEP for follow-up care and safe discharge. Outcome: Resolved/Met Date Met: 18  physical Therapy TREATMENT/DISCHARGE    Patient: Skyler Kline (54 y.o. male)  Date: 2018  Diagnosis: RIGHT KNEE DEGENERATIVE JOINT DISEASE,HYPERTENSION,HYPERLIPIDEMIA  DJD (degenerative joint disease) of knee <principal problem not specified>  Procedure(s) (LRB):  RIGHT TOTAL KNEE REPLACEMENT W/CONFORMIS (Right) 2 Days Post-Op  Precautions: Fall, WBAT  Chart, physical therapy assessment, plan of care and goals were reviewed. ASSESSMENT:  Pt meets needs for safe home mobility, expresses understanding of HEP and is cleared from this level of PT. Progression toward goals:  [x]      Goals met  []      Improving appropriately and progressing toward goals  []      Improving slowly and progressing toward goals  []      Not making progress toward goals and plan of care will be adjusted     PLAN:  Patient will be discharged from physical therapy at this time.   Rationale for discharge:  [x] Goals Achieved  [] [address]  [] Patient not participating in therapy  [] Other:  Discharge Recommendations:  Home Health  Further Equipment Recommendations for Discharge:  rolling walker     SUBJECTIVE:   Patient stated  I am better now     OBJECTIVE DATA SUMMARY:   Critical Behavior:  Neurologic State: Appropriate for age, Alert  Orientation Level: Oriented X4  Cognition: Appropriate decision making  Safety/Judgement: Awareness of environment, Fall prevention, Good awareness of safety precautions, Insight into deficits  Functional Mobility Training:  Bed Mobility:  Supine to Sit: Supervision  Sit to Supine: Supervision  Scooting: Supervision  Transfers:  Sit to Stand: Supervision  Stand to Sit: Supervision  Balance:  Sitting: Intact  Standing: Intact; With support  Ambulation/Gait Training:  Distance (ft): 150 Feet (ft)  Assistive Device: Walker, rolling  Ambulation - Level of Assistance: Supervision  Gait Abnormalities: Antalgic;Decreased step clearance; Step to gait  Right Side Weight Bearing: As tolerated  Base of Support: Widened  Stance: Right decreased; Left increased  Speed/Usad: Slow  Step Length: Left shortened;Right shortened  Swing Pattern: Left asymmetrical;Right asymmetrical  Interventions: Verbal cues; Safety awareness training    Stairs:  Number of Stairs Trained: 12 (full flight )  Stairs - Level of Assistance: Contact guard assistance   Rail Use: Left     Therapeutic Exercises:   Reviewed HEP     Pain:  Pain Scale 1: Numeric (0 - 10)  Pain Intensity 1: 8  Pain Location 1: Knee  Pain Orientation 1: Right  Pain Description 1: Aching; Sore  Pain Intervention(s) 1: Medication (see MAR)  Activity Tolerance:   Good    After treatment:   [] Patient left in no apparent distress sitting up in chair  [x] Patient left in no apparent distress in bed  [x] Call bell left within reach  [x] Nursing notified  [x] Caregiver present  [] Bed alarm activated  Shan Thompson PTA   Time Calculation: 35 mins

## 2018-06-30 ENCOUNTER — HOSPITAL ENCOUNTER (INPATIENT)
Age: 58
LOS: 2 days | Discharge: HOME | DRG: 920 | End: 2018-07-03
Attending: EMERGENCY MEDICINE | Admitting: ORTHOPAEDIC SURGERY
Payer: MEDICARE

## 2018-06-30 DIAGNOSIS — G89.18 POST-OP PAIN: Primary | ICD-10-CM

## 2018-06-30 DIAGNOSIS — R23.8 SKIN BULLA: ICD-10-CM

## 2018-06-30 DIAGNOSIS — L76.82 POSTOPERATIVE SURGICAL COMPLICATION INVOLVING SKIN ASSOCIATED WITH DERMATOLOGIC PROCEDURE, UNSPECIFIED COMPLICATION: ICD-10-CM

## 2018-06-30 PROCEDURE — 99285 EMERGENCY DEPT VISIT HI MDM: CPT

## 2018-06-30 NOTE — IP AVS SNAPSHOT
Candice Boggs 
 
 
 [address] [facility] [address] 
[phone] Patient: Susy Kessler MRN: [id number] About your hospitalization You were admitted on:  July 1, 2018 You last received care in the:  [address] [place] [address] You were discharged on:  July 3, 2018 Why you were hospitalized Your primary diagnosis was:  Post-Operative Complication Your diagnoses also included:  Skin Bulla Follow-up Information Follow up With Details Comments Contact Info Yelitza Conklin MD    [address] [place] [address] 
[phone] Margot Stanley MD On 7/10/2018 Follow up appointment scheduled for July 10, 2018 at 9:45 a.m. (post-op follow-up appointment). [address] [facility] [place] [facility] [address] [place] [id number] 
[phone] 300 [facility] to continue managing your healthcare needs. [phone] Discharge Orders None A check briana indicates which time of day the medication should be taken. My Medications CONTINUE taking these medications Instructions Each Dose to Equal  
 Morning Noon Evening Bedtime  
 atorvastatin 20 mg tablet Commonly known as:  LIPITOR Your last dose was: Your next dose is: Take 20 mg by mouth nightly. 20 mg  
    
   
   
   
  
 COLACE 100 mg capsule Generic drug:  docusate sodium Your last dose was: Your next dose is: Take 100 mg by mouth four (4) times daily as needed for Constipation. 100 mg FIBER-TABS 625 mg tablet Generic drug:  calcium polycarbophil Your last dose was: Your next dose is: Take 625 mg by mouth daily. 625 mg  
    
   
   
   
  
 KlonoPIN 0.5 mg tablet Generic drug:  clonazePAM  
   
Your last dose was: Your next dose is: Take 0.5 mg by mouth two (2) times daily as needed.   
 0.5 mg  
 lisinopril 5 mg tablet Commonly known as:  Muna November Your last dose was: Your next dose is: Take 5 mg by mouth daily. 5 mg  
    
   
   
   
  
 nitroglycerin 0.4 mg SL tablet Commonly known as:  NITROSTAT Your last dose was: Your next dose is: 0.4 mg by SubLINGual route every five (5) minutes as needed for Chest Pain. 0.4 mg  
    
   
   
   
  
 oxyCODONE IR 5 mg immediate release tablet Commonly known as:  Phill [place] Your last dose was: Your next dose is: Take 2 Tabs by mouth every four (4) hours as needed. Max Daily Amount: 60 mg.  
 10 mg  
    
   
   
   
  
 PLAVIX 75 mg Tab Generic drug:  clopidogrel Your last dose was: Your next dose is: Take 75 mg by mouth. 75 mg  
    
   
   
   
  
 QUEtiapine 300 mg tablet Commonly known as:  SEROquel Your last dose was: Your next dose is: Take 300 mg by mouth two (2) times a day. Indications: DEPRESSION TREATMENT ADJUNCT  
 300 mg  
    
   
   
   
  
 raNITIdine 300 mg tablet Commonly known as:  ZANTAC Your last dose was: Your next dose is: Take 300 mg by mouth daily. 300 mg  
    
   
   
   
  
 traMADol 50 mg tablet Commonly known as:  ULTRAM  
   
Your last dose was: Your next dose is: Take 2 Tabs by mouth four (4) times daily. Max Daily Amount: 400 mg.  
 100 mg  
    
   
   
   
  
 * TYLENOL 325 mg tablet Generic drug:  acetaminophen Your last dose was: Your next dose is: Take  by mouth every four (4) hours as needed for Pain. * acetaminophen 325 mg tablet Commonly known as:  TYLENOL Your last dose was: Your next dose is: Take 2 Tabs by mouth every four (4) hours as needed. 650 mg  
    
   
   
   
  
 ZOLOFT 100 mg tablet Generic drug:  sertraline Your last dose was: Your next dose is: Take 100 mg by mouth two (2) times a day. 100 mg * Notice: This list has 2 medication(s) that are the same as other medications prescribed for you. Read the directions carefully, and ask your doctor or other care provider to review them with you. Opioid Education Prescription Opioids: What You Need to Know: 
 
Prescription opioids can be used to help relieve moderate-to-severe pain and are often prescribed following a surgery or injury, or for certain health conditions. These medications can be an important part of treatment but also come with serious risks. Opioids are strong pain medicines. Examples include hydrocodone, oxycodone, fentanyl, and morphine. Heroin is an example of an illegal opioid. It is important to work with your health care provider to make sure you are getting the safest, most effective care. WHAT ARE THE RISKS AND SIDE EFFECTS OF OPIOID USE? Prescription opioids carry serious risks of addiction and overdose, especially with prolonged use. An opioid overdose, often marked by slow breathing, can cause sudden death. The use of prescription opioids can have a number of side effects as well, even when taken as directed. · Tolerance-meaning you might need to take more of a medication for the same pain relief · Physical dependence-meaning you have symptoms of withdrawal when the medication is stopped. Withdrawal symptoms can include nausea, sweating, chills, diarrhea, stomach cramps, and muscle aches. Withdrawal can last up to several weeks, depending on which drug you took and how long you took it. · Increased sensitivity to pain · Constipation · Nausea, vomiting, and dry mouth · Sleepiness and dizziness · Confusion · Depression · Low levels of testosterone that can result in lower sex drive, energy, and strength · Itching and sweating RISKS ARE GREATER WITH:      
· History of drug misuse, substance use disorder, or overdose · Mental health conditions (such as depression or anxiety) · Sleep apnea · Older age (72 years or older) · Pregnancy Avoid alcohol while taking prescription opioids. Also, unless specifically advised by your health care provider, medications to avoid include: · Benzodiazepines (such as Xanax or Valium) · Muscle relaxants (such as Soma or Flexeril) · Hypnotics (such as Ambien or Lunesta) · Other prescription opioids KNOW YOUR OPTIONS Talk to your health care provider about ways to manage your pain that don't involve prescription opioids. Some of these options may actually work better and have fewer risks and side effects. Options may include: 
· Pain relievers such as acetaminophen, ibuprofen, and naproxen · Some medications that are also used for depression or seizures · Physical therapy and exercise · Counseling to help patients learn how to cope better with triggers of pain and stress. · Application of heat or cold compress · Massage therapy · Relaxation techniques Be Informed Make sure you know the name of your medication, how much and how often to take it, and its potential risks & side effects. IF YOU ARE PRESCRIBED OPIOIDS FOR PAIN: 
· Never take opioids in greater amounts or more often than prescribed. Remember the goal is not to be pain-free but to manage your pain at a tolerable level. · Follow up with your primary care provider to: · Work together to create a plan on how to manage your pain. · Talk about ways to help manage your pain that don't involve prescription opioids. · Talk about any and all concerns and side effects. · Help prevent misuse and abuse. · Never sell or share prescription opioids · Help prevent misuse and abuse. · Store prescription opioids in a secure place and out of reach of others (this may include visitors, children, friends, and family). · Safely dispose of unused/unwanted prescription opioids: Find your community drug take-back program or your pharmacy mail-back program, or flush them down the toilet, following guidance from the Food and Drug Administration (www.fda.gov/Drugs/ResourcesForYou). · Visit www.cdc.gov/drugoverdose to learn about the risks of opioid abuse and overdose. · If you believe you may be struggling with addiction, tell your health care provider and ask for guidance or call [address] [place], [place] [address] at 2-344-438-XILD. Discharge Instructions Infection After Surgery: Care Instructions Your Care Instructions After surgery, an infection is always possible. It doesn't mean that the surgery didn't go well. Because an infection can be serious, your doctor has taken steps to manage it. Your doctor checked the infection and cleaned it if necessary. He or she may have made an opening in the area so that the pus can drain out. You may have gauze in the cut so that the area will stay open and keep draining. You may need antibiotics. You will need to follow up with your doctor to make sure the infection has gone away. Follow-up care is a key part of your treatment and safety. Be sure to make and go to all appointments, and call your doctor if you are having problems. It's also a good idea to know your test results and keep a list of the medicines you take. How can you care for yourself at home? · Make sure your surgeon knows that you saw a doctor about the infection. · If your doctor prescribed antibiotics, take them as directed. Do not stop taking them just because you feel better. You need to take the full course of antibiotics. · Ask your doctor if you can take an over-the-counter pain medicine, such as acetaminophen (Tylenol), ibuprofen (Advil, Motrin), or naproxen (Aleve). Be safe with medicines. Read and follow all instructions on the label. · Do not take two or more pain medicines at the same time unless the doctor told you to. Many pain medicines have acetaminophen, which is Tylenol. Too much acetaminophen (Tylenol) can be harmful. · Prop up the area on a pillow anytime you sit or lie down during the next 3 days. Try to keep it above the level of your heart. This will help reduce swelling. · Keep the skin clean and dry. · If you have a bandage, keep it clean and dry. · You may have a dressing over the cut (incision). A dressing helps the incision heal and protects it. Your doctor will tell you how to take care of this. You can expect drainage from the wound. · If your doctor told you how to care for your incision, follow your doctor's instructions. If you did not get instructions, follow this general advice: ¨ Wash around the incision with clean water 2 times a day. Don't use hydrogen peroxide or alcohol, which can slow healing. When should you call for help? Call your doctor now or seek immediate medical care if: 
? · You have signs that your infection is getting worse, such as: 
¨ Increased pain, swelling, warmth, or redness in the area. ¨ Red streaks leading from the area. ¨ Pus draining from the wound. ¨ A new or higher fever. ? Watch closely for changes in your health, and be sure to contact your doctor if you have any problems. Where can you learn more? Go to http://nathaly-meghana.info/. Enter C340 in the search box to learn more about \"Infection After Surgery: Care Instructions. \" Current as of: March 20, 2017 Content Version: 11.4 © 0011-7032 [employer]. Care instructions adapted under license by [employer] (which disclaims liability or warranty for this information). If you have questions about a medical condition or this instruction, always ask your healthcare professional. [address] any warranty or liability for your use of this information. Bouncefootball Announcement We are excited to announce that we are making your provider's discharge notes available to you in Bouncefootball. You will see these notes when they are completed and signed by the physician that discharged you from your recent hospital stay. If you have any questions or concerns about any information you see in Bouncefootball, please call the Health Information Department where you were seen or reach out to your Primary Care Provider for more information about your plan of care. Introducing [facility]! [employer] introduces Bouncefootball patient portal. Now you can access parts of your medical record, email your doctor's office, and request medication refills online. 1. In your internet browser, go to [email or web address] 2. Click on the First Time User? Click Here link in the Sign In box. You will see the New Member Sign Up page. 3. Enter your Bouncefootball Access Code exactly as it appears below. You will not need to use this code after youve completed the sign-up process. If you do not sign up before the expiration date, you must request a new code. · Bouncefootball Access Code: [email or web address] Expires: 8/27/2018  8:56 AM 
 
4. Enter the last four digits of your Social Security Number (xxxx) and Date of Birth (mm/dd/yyyy) as indicated and click Submit. You will be taken to the next sign-up page. 5. Create a Bouncefootball ID. This will be your Bouncefootball login ID and cannot be changed, so think of one that is secure and easy to remember. 6. Create a Bouncefootball password. You can change your password at any time. 7. Enter your Password Reset Question and Answer. This can be used at a later time if you forget your password. 8. Enter your e-mail address. You will receive e-mail notification when new information is available in [address] 9. Click Sign Up. You can now view and download portions of your medical record. 10. Click the Download Summary menu link to download a portable copy of your medical information. If you have questions, please visit the Frequently Asked Questions section of the [employer] website. Remember, [employer] is NOT to be used for urgent needs. For medical emergencies, dial 911. Now available from your iPhone and Android! Introducing Jed Butterfield As a Tae Longoria patient, I wanted to make you aware of our electronic visit tool called Jed Butterfield. Tae Von 24/7 allows you to connect within minutes with a medical provider 24 hours a day, seven days a week via a mobile device or tablet or logging into a secure website from your computer. You can access Jed Butterfield from anywhere in the [place]. A virtual visit might be right for you when you have a simple condition and feel like you just dont want to get out of bed, or cant get away from work for an appointment, when your regular [facility] provider is not available (evenings, weekends or holidays), or when youre out of town and need minor care. Electronic visits cost only $49 and if the Tae Von 24/7 provider determines a prescription is needed to treat your condition, one can be electronically transmitted to a nearby pharmacy*. Please take a moment to enroll today if you have not already done so. The enrollment process is free and takes just a few minutes. To enroll, please download the Peter Single ToVieFor/7 jono to your tablet or phone, or visit [email or web address] to enroll on your computer. And, as an [address] [place] [address] patient with a [employer] account, the results of your visits will be scanned into your electronic medical record and your primary care provider will be able to view the scanned results. We urge you to continue to see your regular Tae Longoria provider for your ongoing medical care.   And while your primary care provider may not be the one available when you seek a Jed Butterfield virtual visit, the peace of mind you get from getting a real diagnosis real time can be priceless. For more information on Jed Butterfield, view our Frequently Asked Questions (FAQs) at [email or web address]. Sincerely, 
 
Fabby Dallas MD 
Chief Medical Officer Hilton Sanabria *:  certain medications cannot be prescribed via Jed Butterfield Providers Seen During Your Hospitalization Provider Specialty Primary office phone Matthew Rodarte MD Emergency Medicine [phone] Edmond Bravo MD Orthopedic Surgery [phone] Your Primary Care Physician (PCP) Primary Care Physician Office Phone Office Fax Sita Chaveztrinity [phone] You are allergic to the following No active allergies Recent Documentation Height Weight BMI Smoking Status 1.803 m 102.1 kg 31.38 kg/m2 Current Some Day Smoker Emergency Contacts Name Discharge Info Relation Home Work Mobile Erik Rizowarren DISCHARGE CAREGIVER [3] Girlfriend [18]   [phone] Patient Belongings The following personal items are in your possession at time of discharge: 
  Dental Appliances: Uppers, Lowers  Visual Aid: Glasses, With patient      Home Medications: None   Jewelry: None  Clothing: At bedside    Other Valuables: None Please provide this summary of care documentation to your next provider. Signatures-by signing, you are acknowledging that this After Visit Summary has been reviewed with you and you have received a copy. Patient Signature:  ____________________________________________________________ Date:  ____________________________________________________________  
  
Angelica Kwon Provider Signature:  ____________________________________________________________ Date:  ____________________________________________________________

## 2018-07-01 PROBLEM — T81.9XXA POST-OPERATIVE COMPLICATION: Status: ACTIVE | Noted: 2018-07-01

## 2018-07-01 PROBLEM — R23.8 SKIN BULLA: Status: ACTIVE | Noted: 2018-07-01

## 2018-07-01 LAB
ALBUMIN SERPL-MCNC: 2.5 G/DL (ref 3.4–5)
ALBUMIN/GLOB SERPL: 0.5 {RATIO} (ref 0.8–1.7)
ALP SERPL-CCNC: 87 U/L (ref 45–117)
ALT SERPL-CCNC: 36 U/L (ref 16–61)
ANION GAP SERPL CALC-SCNC: 8 MMOL/L (ref 3–18)
AST SERPL-CCNC: 25 U/L (ref 15–37)
BILIRUB SERPL-MCNC: 1 MG/DL (ref 0.2–1)
BUN SERPL-MCNC: 13 MG/DL (ref 7–18)
BUN/CREAT SERPL: 11 (ref 12–20)
CALCIUM SERPL-MCNC: 8.2 MG/DL (ref 8.5–10.1)
CHLORIDE SERPL-SCNC: 98 MMOL/L (ref 100–108)
CO2 SERPL-SCNC: 28 MMOL/L (ref 21–32)
CREAT SERPL-MCNC: 1.2 MG/DL (ref 0.6–1.3)
GLOBULIN SER CALC-MCNC: 4.6 G/DL (ref 2–4)
GLUCOSE SERPL-MCNC: 188 MG/DL (ref 74–99)
POTASSIUM SERPL-SCNC: 4.3 MMOL/L (ref 3.5–5.5)
PROT SERPL-MCNC: 7.1 G/DL (ref 6.4–8.2)
SODIUM SERPL-SCNC: 134 MMOL/L (ref 136–145)

## 2018-07-01 PROCEDURE — 36415 COLL VENOUS BLD VENIPUNCTURE: CPT | Performed by: ORTHOPAEDIC SURGERY

## 2018-07-01 PROCEDURE — 74011250636 HC RX REV CODE- 250/636: Performed by: ORTHOPAEDIC SURGERY

## 2018-07-01 PROCEDURE — 65270000029 HC RM PRIVATE

## 2018-07-01 PROCEDURE — 80053 COMPREHEN METABOLIC PANEL: CPT | Performed by: ORTHOPAEDIC SURGERY

## 2018-07-01 PROCEDURE — 77030027138 HC INCENT SPIROMETER -A

## 2018-07-01 PROCEDURE — 74011250636 HC RX REV CODE- 250/636: Performed by: EMERGENCY MEDICINE

## 2018-07-01 PROCEDURE — 74011250637 HC RX REV CODE- 250/637: Performed by: ORTHOPAEDIC SURGERY

## 2018-07-01 PROCEDURE — 96374 THER/PROPH/DIAG INJ IV PUSH: CPT

## 2018-07-01 RX ORDER — HYDROMORPHONE HYDROCHLORIDE 2 MG/1
2-4 TABLET ORAL
Status: DISCONTINUED | OUTPATIENT
Start: 2018-07-01 | End: 2018-07-01

## 2018-07-01 RX ORDER — HYDROMORPHONE HYDROCHLORIDE 2 MG/ML
1 INJECTION, SOLUTION INTRAMUSCULAR; INTRAVENOUS; SUBCUTANEOUS ONCE
Status: COMPLETED | OUTPATIENT
Start: 2018-07-01 | End: 2018-07-01

## 2018-07-01 RX ORDER — QUETIAPINE FUMARATE 300 MG/1
300 TABLET, FILM COATED ORAL 2 TIMES DAILY
COMMUNITY

## 2018-07-01 RX ORDER — TRAMADOL HYDROCHLORIDE 50 MG/1
100 TABLET ORAL 4 TIMES DAILY
Status: DISCONTINUED | OUTPATIENT
Start: 2018-07-01 | End: 2018-07-03

## 2018-07-01 RX ORDER — SODIUM CHLORIDE, SODIUM LACTATE, POTASSIUM CHLORIDE, CALCIUM CHLORIDE 600; 310; 30; 20 MG/100ML; MG/100ML; MG/100ML; MG/100ML
70 INJECTION, SOLUTION INTRAVENOUS CONTINUOUS
Status: DISCONTINUED | OUTPATIENT
Start: 2018-07-01 | End: 2018-07-01

## 2018-07-01 RX ORDER — SERTRALINE HYDROCHLORIDE 50 MG/1
25 TABLET, FILM COATED ORAL DAILY
Status: DISCONTINUED | OUTPATIENT
Start: 2018-07-01 | End: 2018-07-02 | Stop reason: DRUGHIGH

## 2018-07-01 RX ORDER — ACETAMINOPHEN 325 MG/1
650 TABLET ORAL 4 TIMES DAILY
Status: DISCONTINUED | OUTPATIENT
Start: 2018-07-01 | End: 2018-07-03

## 2018-07-01 RX ORDER — DOCUSATE SODIUM 100 MG/1
100 CAPSULE, LIQUID FILLED ORAL
COMMUNITY

## 2018-07-01 RX ORDER — LISINOPRIL 5 MG/1
5 TABLET ORAL DAILY
Status: DISCONTINUED | OUTPATIENT
Start: 2018-07-01 | End: 2018-07-02

## 2018-07-01 RX ORDER — SODIUM CHLORIDE, SODIUM LACTATE, POTASSIUM CHLORIDE, CALCIUM CHLORIDE 600; 310; 30; 20 MG/100ML; MG/100ML; MG/100ML; MG/100ML
70 INJECTION, SOLUTION INTRAVENOUS CONTINUOUS
Status: DISCONTINUED | OUTPATIENT
Start: 2018-07-01 | End: 2018-07-03 | Stop reason: HOSPADM

## 2018-07-01 RX ORDER — CLONAZEPAM 0.5 MG/1
0.5 TABLET ORAL DAILY
Status: DISCONTINUED | OUTPATIENT
Start: 2018-07-01 | End: 2018-07-03

## 2018-07-01 RX ORDER — FACIAL-BODY WIPES
10 EACH TOPICAL DAILY PRN
Status: DISCONTINUED | OUTPATIENT
Start: 2018-07-01 | End: 2018-07-03 | Stop reason: HOSPADM

## 2018-07-01 RX ORDER — QUETIAPINE FUMARATE 100 MG/1
300 TABLET, FILM COATED ORAL 2 TIMES DAILY
Status: DISCONTINUED | OUTPATIENT
Start: 2018-07-01 | End: 2018-07-03

## 2018-07-01 RX ORDER — HYDROMORPHONE HYDROCHLORIDE 2 MG/1
2-4 TABLET ORAL
Status: DISCONTINUED | OUTPATIENT
Start: 2018-07-01 | End: 2018-07-03 | Stop reason: HOSPADM

## 2018-07-01 RX ORDER — VANCOMYCIN 2 GRAM/500 ML IN 0.9 % SODIUM CHLORIDE INTRAVENOUS
2000 ONCE
Status: DISCONTINUED | OUTPATIENT
Start: 2018-07-01 | End: 2018-07-01

## 2018-07-01 RX ORDER — ACETAMINOPHEN 325 MG/1
650 TABLET ORAL
Status: DISCONTINUED | OUTPATIENT
Start: 2018-07-01 | End: 2018-07-01

## 2018-07-01 RX ORDER — CIPROFLOXACIN 500 MG/1
750 TABLET ORAL 2 TIMES DAILY
Status: DISCONTINUED | OUTPATIENT
Start: 2018-07-01 | End: 2018-07-02

## 2018-07-01 RX ADMIN — ACETAMINOPHEN 650 MG: 325 TABLET, FILM COATED ORAL at 21:37

## 2018-07-01 RX ADMIN — TRAMADOL HYDROCHLORIDE 100 MG: 50 TABLET, FILM COATED ORAL at 14:05

## 2018-07-01 RX ADMIN — ACETAMINOPHEN 650 MG: 325 TABLET, FILM COATED ORAL at 18:20

## 2018-07-01 RX ADMIN — CIPROFLOXACIN HYDROCHLORIDE 750 MG: 500 TABLET, FILM COATED ORAL at 09:09

## 2018-07-01 RX ADMIN — SODIUM CHLORIDE 1000 MG: 900 INJECTION, SOLUTION INTRAVENOUS at 20:22

## 2018-07-01 RX ADMIN — ACETAMINOPHEN 650 MG: 325 TABLET, FILM COATED ORAL at 09:10

## 2018-07-01 RX ADMIN — TRAMADOL HYDROCHLORIDE 100 MG: 50 TABLET, FILM COATED ORAL at 18:20

## 2018-07-01 RX ADMIN — HYDROMORPHONE HYDROCHLORIDE 2 MG: 2 TABLET ORAL at 20:32

## 2018-07-01 RX ADMIN — CIPROFLOXACIN HYDROCHLORIDE 750 MG: 500 TABLET, FILM COATED ORAL at 20:31

## 2018-07-01 RX ADMIN — HYDROMORPHONE HYDROCHLORIDE 4 MG: 2 TABLET ORAL at 07:46

## 2018-07-01 RX ADMIN — QUETIAPINE FUMARATE 300 MG: 100 TABLET ORAL at 20:31

## 2018-07-01 RX ADMIN — LISINOPRIL 5 MG: 5 TABLET ORAL at 12:52

## 2018-07-01 RX ADMIN — CLONAZEPAM 0.5 MG: 0.5 TABLET ORAL at 14:05

## 2018-07-01 RX ADMIN — HYDROMORPHONE HYDROCHLORIDE 4 MG: 2 TABLET ORAL at 12:52

## 2018-07-01 RX ADMIN — HYDROMORPHONE HYDROCHLORIDE 1 MG: 2 INJECTION, SOLUTION INTRAMUSCULAR; INTRAVENOUS; SUBCUTANEOUS at 00:41

## 2018-07-01 RX ADMIN — HYDROMORPHONE HYDROCHLORIDE 4 MG: 2 TABLET ORAL at 03:56

## 2018-07-01 RX ADMIN — SODIUM CHLORIDE 1000 MG: 900 INJECTION, SOLUTION INTRAVENOUS at 09:05

## 2018-07-01 RX ADMIN — SERTRALINE HYDROCHLORIDE 25 MG: 50 TABLET ORAL at 12:52

## 2018-07-01 RX ADMIN — SODIUM CHLORIDE, SODIUM LACTATE, POTASSIUM CHLORIDE, AND CALCIUM CHLORIDE 70 ML/HR: 600; 310; 30; 20 INJECTION, SOLUTION INTRAVENOUS at 05:00

## 2018-07-01 NOTE — PROGRESS NOTES
Shift summary: Pt A&Ox4, up with assistance, incision with staples to right knee noted to have multiple blisters around incision. Small amount of serous drainage from distal end of incision. Incision open to air. Wife at bedside.

## 2018-07-01 NOTE — ED NOTES
Pt received Zosyn, tylenol (650mg at 1830), vancomycin and LR 3000ml at [place]. Pt also received morphine 8mg and zofran IV at 1907. Pt reports worsening pain. xrays and labs [facility]. IV is saline locked currently.

## 2018-07-01 NOTE — PROGRESS NOTES
Reason for Readmission:    Post op complication          RRAT Score and Risk Level:     Mod; 18     Level of Readmission: Mod       Care Conference scheduled:   TBD       Resources/supports as identified by patient/family:   N/A       Top Challenges facing patient (as identified by patient/family and CM):    TBD     Finances/Medication cost?       Transportation        Support system or lack thereof? Living arrangements? Self-care/ADLs/Cognition? Current Advanced Directive/Advance Care Plan:  Not on file           Plan for utilizing home health:   Current with [facility]             Likelihood of additional readmission:                Transition of Care Plan:    Based on readmission, the patient's previous Plan of Care   has been evaluated and/or modified. The current Transition of Care Plan is:       TBD    Pt admitted due to a post op complication. Pt recently underwent a right TKRA. Pt presented to the ER with increased swelling and pain. CM reviewed previous admission documentaion and noted [facility] was arranged. Pt is currently being treated with IVABX and awaiting physician recommendation to move forward with plan of care. CM to continue to follow. Care Management Interventions  PCP Verified by CM: Yes  Mode of Transport at Discharge:  Other (see comment) (significant other)  Transition of Care Consult (CM Consult): [address]: No  Reason Outside [place]: Patient already serviced by other home care/hospice agency (current with [facility])  Health Maintenance Reviewed: Yes  Physical Therapy Consult: Yes  Occupational Therapy Consult: Yes  Current Support Network: Family Lives [place], Other (significant other)  Confirm Follow Up Transport: Family  Discharge Location  Discharge Placement: Home with home health (current with [facility])

## 2018-07-01 NOTE — ROUTINE PROCESS
Bedside and Verbal shift change report given to Danyell Rodriguez RN (oncoming nurse) by JAYLEN Coronado RN (offgoing nurse). Report included the following information SBAR, Kardex, Intake/Output and MAR.

## 2018-07-01 NOTE — ED NOTES
Pt requesting pain medication. MD notified. VSS. Pt denies any other needs. Pt repositioned for comfort.

## 2018-07-01 NOTE — ROUTINE PROCESS
TRANSFER - OUT REPORT:    Verbal report given to Ari Johnson RN(name) on Surinder Hopper  being transferred to [facility] Surg room 327 (unit) for routine progression of care       Report consisted of patients Situation, Background, Assessment and   Recommendations(SBAR). Information from the following report(s) SBAR and ED Summary was reviewed with the receiving nurse. Lines:   Peripheral IV 06/30/18 Right Antecubital (Active)   Site Assessment Clean, dry, & intact 6/30/2018 10:54 PM   Dressing Status Clean, dry, & intact 6/30/2018 10:54 PM        Opportunity for questions and clarification was provided.       Patient transported with:   [employer]

## 2018-07-01 NOTE — H&P
History and Physical        Patient: Magui Up               Sex: male          DOA: 6/30/2018         YOB: 1960      Age:  63 y.o.        LOS:  LOS: 0 days        HPI:     Magui Up is a 63 y.o. male who underwent right TKRA with Dr. Fabio Mccauley last Monday. He has developed increased swelling and pain. He presented to [facility] who felt this may represent an infection secondary to the swelling and large blisters. He was transferred to [facility] and admitted for eval.  Significant IV antibiotics have already been given. No current fevers, chills, or suggestion of infection. Past Medical History:   Diagnosis Date    Acid reflux     Arthritis     CAD (coronary artery disease)     MI x 2 with stents    GERD (gastroesophageal reflux disease)     High cholesterol     Hypertension 2010       Past Surgical History:   Procedure Laterality Date    HX CORONARY STENT PLACEMENT      HX HEENT      teeth removal    HX ORTHOPAEDIC Right     knee open procedure after accident   [address] [facility]      lower back       History reviewed. No pertinent family history. Social History     Social History    Marital status:      Spouse name: N/A    Number of children: N/A    Years of education: N/A     Social History Main Topics    Smoking status: Current Some Day Smoker     Packs/day: 1.00     Years: 40.00    Smokeless tobacco: Never Used      Comment: advised to hold all tobacco 24 hours prior     Alcohol use Yes      Comment: 2 x month    Drug use: No    Sexual activity: Not Asked     Other Topics Concern    None     Social History Narrative       Prior to Admission medications    Medication Sig Start Date End Date Taking? Authorizing Provider   docusate sodium (COLACE) 100 mg capsule Take 100 mg by mouth four (4) times daily as needed for Constipation.    Yes Historical Provider   oxyCODONE IR (ROXICODONE) 5 mg immediate release tablet Take 2 Tabs by mouth every four (4) hours as needed. Max Daily Amount: 60 mg. 6/27/18  Yes Shannon Nolan MD   traMADol Geovanny Rubins) 50 mg tablet Take 2 Tabs by mouth four (4) times daily. Max Daily Amount: 400 mg. 6/27/18  Yes Shannon Nolan MD   calcium polycarbophil (FIBER-TABS) 625 mg tablet Take 625 mg by mouth daily. Yes Historical Provider   acetaminophen (TYLENOL) 325 mg tablet Take  by mouth every four (4) hours as needed for Pain. Yes Historical Provider   atorvastatin (LIPITOR) 20 mg tablet Take 20 mg by mouth nightly. Yes Laverne Up MD   lisinopril (PRINIVIL, ZESTRIL) 5 mg tablet Take 5 mg by mouth daily. Yes Laverne Up MD   raNITIdine (ZANTAC) 300 mg tablet Take 300 mg by mouth daily. Yes Laverne Up MD   acetaminophen (TYLENOL) 325 mg tablet Take 2 Tabs by mouth every four (4) hours as needed. 6/27/18   Shannon Nolan MD   clonazePAM (KLONOPIN) 0.5 mg tablet Take  by mouth nightly as needed. Historical Provider   sertraline (ZOLOFT) 25 mg tablet Take 25 mg by mouth daily. Historical Provider   clopidogrel (PLAVIX) 75 mg tab Take 75 mg by mouth. Laverne Up MD   nitroglycerin (NITROSTAT) 0.4 mg SL tablet 0.4 mg by SubLINGual route every five (5) minutes as needed for Chest Pain. Laverne Up MD       No Known Allergies    Review of Systems  A comprehensive review of systems was negative except for that written in the History of Present Illness. Physical Exam:      Visit Vitals    /76 (BP 1 Location: Left arm, BP Patient Position: At rest)    Pulse 89    Temp 99.8 °F (37.7 °C)    Resp 18    Ht 5' 11\" (1.803 m)    Wt 97.5 kg (215 lb)    SpO2 96%    BMI 29.99 kg/m2       Physical Exam:  Physical Exam:   General:  Alert, cooperative, no distress, appears stated age. Eyes:  Conjunctivae/corneas clear. PERRL, EOMs intact. Fundi benign   Ears:  Normal TMs and external ear canals both ears. Nose: Nares normal. Septum midline. Mucosa normal. No drainage or sinus tenderness.    Mouth/Throat: Lips, mucosa, and tongue normal. Teeth and gums normal.   Neck: Supple, symmetrical, trachea midline, no adenopathy, thyroid: no enlargement/tenderness/nodules, no carotid bruit and no JVD. Back:   Symmetric, no curvature. ROM normal. No CVA tenderness. Lungs:   Clear to auscultation bilaterally. Heart:  Regular rate and rhythm, S1, S2 normal, no murmur, click, rub or gallop. Abdomen:   Soft, non-tender. Bowel sounds normal. No masses,  No organomegaly. Extremities: Extremities normal, atraumatic, no cyanosis or edema. Right knee large blood filled blisters with swelling and slight warmth consistent with post operative hematoma. Negative calf TTP   Pulses: 2+ and symmetric all extremities. Skin: Skin color, texture, turgor normal. No rashes or lesions   Lymph nodes: Cervical, supraclavicular, and axillary nodes normal.   Neurologic: CNII-XII intact. Normal strength, sensation and reflexes throughout. Labs Reviewed: All lab results for the last 24 hours reviewed. Assessment/Plan     Active Problems:    Skin bulla (7/1/2018)      Post-operative complication (8/9/4550)        Patient has already had significant antibiotics. Discussed arthrocentesis today but patient is needlephobic and does not want to proceed. Discussed with him that this seems to represent a hematoma secondary to his blood thinners and may need to have aspirated. Pt wants to wait for Dr. Bhanu Nichols to discuss this. He is currently on prophylactic antibiotics and pain is well controlled.  I will hold his blood thinner and place into calf hose and foot pumps and continue ice until Dr. Tello hernandez tomorrow

## 2018-07-01 NOTE — ROUTINE PROCESS
Bedside and Verbal shift change report given to Renée Mittal RN (oncoming nurse) by Elie Arredondo   (offgoing nurse). Report included the following information SBAR, Kardex and MAR.

## 2018-07-01 NOTE — PROGRESS NOTES
Pharmacy Dosing Services: Vancomycin    Consult for Vancomycin Dosing by Pharmacy by Dr. Gwen Corrigan provided for this 63y.o. year old male , for indication of surgical site infection. Day of Therapy 1    Ht Readings from Last 1 Encounters:   06/30/18 180.3 cm (71\")        Wt Readings from Last 1 Encounters:   06/30/18 97.5 kg (215 lb)        Last Level   Other Current Antibiotics   Significant Cultures   Serum Creatinine   Creatinine Clearance   BUN   WBC   H/H   Platelets   Temp     Start Vancomycin therapy, with loading dose of 2000(mg) at 0200/07/01/2018(time/date). Follow with maintenance dose of 1000(mg) at 1500/ 07/01/2018 (time/date), every 12 hours (frequency). Dose calculated to approximate a therapeutic trough of 15-20mcg/mL. Pharmacy to follow daily and will make changes to dose and/or frequency based on clinical status. Estimated Pharmacokinetic Parameters (based on population kinetics)  Vd: 68 L (0.7 L/kg)   Oliver: 0.054 hr-1 (T1/2 = 12.8 hrs)     Dosing Recommendations   Vancomycin dose: 1000 mg IV Q12hrs (infused over 1 hr)   Estimated peak: 29.9 mcg/mL   Estimated trough: 16.5 mcg/mL   Estimated AUC:NOHELIA: 543 mcg*hr/mL (assumed NOHELIA 1 mcg/mL)     A/P:   1. Recommend vancomycin 1000 mg IV Q12hrs (10 mg/kg)   2. Consider a vancomycin trough level prior to the 4th dose. 3. Please monitor renal function (urine output, BUN/SCr). Dose adjustments may be necessary with a significant change in renal function. 4. Vancomycin loading dose of 2000 mg to facilitate rapid attainment of target trough serum vancomycin levels.   Pharmacist Angelique English, PHARMD

## 2018-07-01 NOTE — PROGRESS NOTES
Problem: Falls - Risk of  Goal: *Absence of Falls  Document Patricia Fall Risk and appropriate interventions in the flowsheet.    Outcome: Progressing Towards Goal  Fall Risk Interventions:  Mobility Interventions: Communicate number of staff needed for ambulation/transfer         Medication Interventions: Teach patient to arise slowly         History of Falls Interventions: Door open when patient unattended

## 2018-07-01 NOTE — ED PROVIDER NOTES
EMERGENCY DEPARTMENT HISTORY AND PHYSICAL EXAM    Date: 6/30/2018  Patient Name: Kia Quintero    History of Presenting Illness     Chief Complaint   Patient presents with    Post OP Complication         History Provided By: Patient & Pt's spouse    Chief Complaint: knee pain and wounds  Duration: 5 Days  Timing:  Worsening  Location: right knee  Severity: 9 out of 10  Associated Symptoms: fever    Additional History (Context):     11:36 PM    Kia Quintero is a 63 y.o. male with pertinent PMHx of HLD, arthritis, and HTN presenting via EMS to the ED c/o increasing 9/10 right knee pain and blistering s/p surgery 5 days ago. Pt has been taking Oxycodone and Tylenol for the pain. Pt specifically denies any cough, SOB or CP. Pt arrives via EMS from [facility] for complications post surgery. Pt had right knee surgery on 6/25/18 by Dr. Yudelka Cuevas. Pt was discharged on 6/28/18. Pt has blisters and swelling to right knee, which have worsened since surgery. Pt's spouse states that pt has had a fever since before his discharge on 6/28/18. Pt's spouse states that pt has not had bowel movement since the surgery. Pt was sent here for fever and slightly elevated WBC, but lactate was nml. Pt denies any hx of DM. PCP: Kate Haney MD    There are no other complaints, changes, or physical findings at this time. Current Outpatient Prescriptions   Medication Sig Dispense Refill    acetaminophen (TYLENOL) 325 mg tablet Take 2 Tabs by mouth every four (4) hours as needed. 120 Tab 0    oxyCODONE IR (ROXICODONE) 5 mg immediate release tablet Take 2 Tabs by mouth every four (4) hours as needed. Max Daily Amount: 60 mg. 60 Tab 0    traMADol (ULTRAM) 50 mg tablet Take 2 Tabs by mouth four (4) times daily. Max Daily Amount: 400 mg. 60 Tab 0    clonazePAM (KLONOPIN) 0.5 mg tablet Take  by mouth nightly as needed.  sertraline (ZOLOFT) 25 mg tablet Take 25 mg by mouth daily.       calcium polycarbophil (FIBER-TABS) 625 mg tablet Take 625 mg by mouth daily.  acetaminophen (TYLENOL) 325 mg tablet Take  by mouth every four (4) hours as needed for Pain.  clopidogrel (PLAVIX) 75 mg tab Take 75 mg by mouth.  atorvastatin (LIPITOR) 20 mg tablet Take 20 mg by mouth nightly.  lisinopril (PRINIVIL, ZESTRIL) 5 mg tablet Take 5 mg by mouth daily.  nitroglycerin (NITROSTAT) 0.4 mg SL tablet 0.4 mg by SubLINGual route every five (5) minutes as needed for Chest Pain.  raNITIdine (ZANTAC) 300 mg tablet Take 300 mg by mouth daily. Past History     Past Medical History:  Past Medical History:   Diagnosis Date    Acid reflux     Arthritis     CAD (coronary artery disease)     MI x 2 with stents    GERD (gastroesophageal reflux disease)     High cholesterol     Hypertension 2010       Past Surgical History:  Past Surgical History:   Procedure Laterality Date    HX CORONARY STENT PLACEMENT      HX HEENT      teeth removal    HX ORTHOPAEDIC Right     knee open procedure after accident    NEUROLOGICAL PROCEDURE UNLISTED      lower back       Family History:  History reviewed. No pertinent family history. Social History:  Social History   Substance Use Topics    Smoking status: Current Some Day Smoker     Packs/day: 1.00     Years: 40.00    Smokeless tobacco: Never Used      Comment: advised to hold all tobacco 24 hours prior     Alcohol use Yes      Comment: 2 x month       Allergies:  No Known Allergies      Review of Systems   Review of Systems   Constitutional: Positive for fever. Respiratory: Negative for shortness of breath. Cardiovascular: Negative for chest pain. Musculoskeletal: Positive for arthralgias (right knee). Skin: Positive for wound (right knee with blistering). All other systems reviewed and are negative.       Physical Exam     Vitals:    06/30/18 2345 07/01/18 0000 07/01/18 0015 07/01/18 0030   BP: 133/73 158/85 (!) 128/92 165/88   Pulse: 90 82 83 88   Resp: 21 19 25 20 Temp:  99.3 °F (37.4 °C)     SpO2: 98% 96% 95% 95%   Weight:       Height:         Physical Exam   Constitutional: He is oriented to person, place, and time. He appears well-developed and well-nourished. Feels hot   HENT:   Head: Normocephalic and atraumatic. Right Ear: External ear normal.   Left Ear: External ear normal.   Mouth/Throat: Oropharynx is clear and moist. No oropharyngeal exudate. Eyes: Conjunctivae and EOM are normal. Pupils are equal, round, and reactive to light. No scleral icterus. No pallor   Neck: Normal range of motion. Neck supple. No JVD present. No tracheal deviation present. No thyromegaly present. Cardiovascular: Normal rate, regular rhythm and normal heart sounds. Nml DP and PT pulses to the RLE   Pulmonary/Chest: Effort normal and breath sounds normal. No stridor. No respiratory distress. Abdominal: Soft. Bowel sounds are normal. He exhibits no distension. There is no tenderness. There is no rebound and no guarding. Musculoskeletal:   RLE  Leg is swollen and TTP with large blister appearance to top and peripheral portions of wounds. Good distal blood flow. Lymphadenopathy:     He has no cervical adenopathy. Neurological: He is alert and oriented to person, place, and time. He has normal reflexes. No cranial nerve deficit. Coordination normal.   Skin: Skin is warm and dry. He is not diaphoretic. See musculoskeletal   Psychiatric: He has a normal mood and affect. His behavior is normal. Judgment and thought content normal.   Nursing note and vitals reviewed. Diagnostic Study Results     Labs -   No results found for this or any previous visit (from the past 12 hour(s)). Radiologic Studies -   No orders to display       Medical Decision Making   I am the first provider for this patient. I reviewed the vital signs, available nursing notes, past medical history, past surgical history, family history and social history.     Vital Signs-Reviewed the patient's vital signs. Pulse Oximetry Analysis - 97% on RA     Records Reviewed: Nursing Notes and Old Medical Records    Provider Notes (Medical Decision Making): Allergic response vs infections, will contact Dr. Lauren Zhang and discuss further management      PROCEDURES:  Procedures    MEDICATIONS GIVEN IN THE ED:  Medications   HYDROmorphone (PF) (DILAUDID) injection 1 mg (1 mg IntraVENous Given 7/1/18 0041)        ED COURSE:   11:36 PM   Initial assessment performed. CONSULT NOTE:  12:02 AM  SunTrmegan Madrid MD spoke with Dr. Lauren Zhang,  Specialty: Ortho  Discussed pt's hx, disposition, and available diagnostic and imaging results. Reviewed care plans. Consultant agrees with plans as outlined. Dr. Lauren Zhang will admit the pt. Written by Kiana Cespedes, ED Scribe, as dictated by Avril Madrid MD.       Diagnosis and Disposition       Core Measures:  For Hospitalized Patients:    1. Hospitalization Decision Time:  The decision to hospitalize the patient was made by [employer]. Jeremy Madrid MD at 11:36 PM on 6/30/2018    2. Aspirin: Aspirin was not given because the patient did not present with a stroke at the time of their Emergency Department evaluation    12:03 AM  Patient is being admitted to the hospital by Dr. Lauren Zhang. The results of their tests and reasons for their admission have been discussed with them and/or available family. They convey agreement and understanding for the need to be admitted and for their admission diagnosis. CONDITIONS ON ADMISSION:  Sepsis is not present at the time of admission. Deep Vein Thrombosis is not present at the time of admission. Thrombosis is not present at the time of admission. Urinary Tract Infection is not present at the time of admission. Pneumonia is not present at the time of admission. Pressure Ulcer is not present at the time of admission. CLINICAL IMPRESSION:  1. Post-op pain    2. Skin bulla    3.  Postoperative surgical complication involving skin associated with dermatologic procedure, unspecified complication        PLAN:  1. ADMIT TO MEDICAL  _______________________________    Attestations: This note is prepared by Geovanna Posey, acting as Scribe for Richard. Jose Martin MD.    [employer]. Jose Martin MD:  The scribe's documentation has been prepared under my direction and personally reviewed by me in its entirety.   I confirm that the note above accurately reflects all work, treatment, procedures, and medical decision making performed by me.  _______________________________

## 2018-07-01 NOTE — PROGRESS NOTES
Ortho  Chart reviewed. Alert  VSS  Right knee multiple bullae. Min effusion  Stable  Plan: At bedside Bullae decompressed.  ABD/ace dressing  Regular diet ordered, am lab, suppository- no BM 7 days

## 2018-07-01 NOTE — PROGRESS NOTES
Physical Therapy Screening:  Services are indicated and therapy order is required. An InBasket screening referral was triggered for physical therapy based on results obtained during the nursing admission assessment. The patients chart was reviewed and the patient is appropriate for a skilled therapy evaluation, post Ortho consult. Please order a consult for physical therapy if you are in agreement and would like an evaluation to be completed. Thank you.     Rickie Bowman PTA

## 2018-07-02 ENCOUNTER — APPOINTMENT (OUTPATIENT)
Dept: VASCULAR SURGERY | Age: 58
DRG: 920 | End: 2018-07-02
Attending: HOSPITALIST
Payer: MEDICARE

## 2018-07-02 LAB
ANION GAP SERPL CALC-SCNC: 8 MMOL/L (ref 3–18)
BUN SERPL-MCNC: 13 MG/DL (ref 7–18)
BUN/CREAT SERPL: 10 (ref 12–20)
CALCIUM SERPL-MCNC: 8.6 MG/DL (ref 8.5–10.1)
CHLORIDE SERPL-SCNC: 100 MMOL/L (ref 100–108)
CO2 SERPL-SCNC: 29 MMOL/L (ref 21–32)
CREAT SERPL-MCNC: 1.26 MG/DL (ref 0.6–1.3)
ERYTHROCYTE [DISTWIDTH] IN BLOOD BY AUTOMATED COUNT: 15.1 % (ref 11.6–14.5)
GLUCOSE SERPL-MCNC: 162 MG/DL (ref 74–99)
HCT VFR BLD AUTO: 21 % (ref 36–48)
HCT VFR BLD AUTO: 23 % (ref 36–48)
HGB BLD-MCNC: 6.8 G/DL (ref 13–16)
HGB BLD-MCNC: 7.6 G/DL (ref 13–16)
MCH RBC QN AUTO: 23.7 PG (ref 24–34)
MCHC RBC AUTO-ENTMCNC: 32.4 G/DL (ref 31–37)
MCV RBC AUTO: 73.2 FL (ref 74–97)
PLATELET # BLD AUTO: 289 K/UL (ref 135–420)
PMV BLD AUTO: 9.1 FL (ref 9.2–11.8)
POTASSIUM SERPL-SCNC: 3.9 MMOL/L (ref 3.5–5.5)
RBC # BLD AUTO: 2.87 M/UL (ref 4.7–5.5)
SODIUM SERPL-SCNC: 137 MMOL/L (ref 136–145)
WBC # BLD AUTO: 8.7 K/UL (ref 4.6–13.2)

## 2018-07-02 PROCEDURE — 74011250637 HC RX REV CODE- 250/637: Performed by: ORTHOPAEDIC SURGERY

## 2018-07-02 PROCEDURE — 85027 COMPLETE CBC AUTOMATED: CPT | Performed by: ORTHOPAEDIC SURGERY

## 2018-07-02 PROCEDURE — 97161 PT EVAL LOW COMPLEX 20 MIN: CPT

## 2018-07-02 PROCEDURE — 74011250636 HC RX REV CODE- 250/636: Performed by: ORTHOPAEDIC SURGERY

## 2018-07-02 PROCEDURE — 65270000029 HC RM PRIVATE

## 2018-07-02 PROCEDURE — 36430 TRANSFUSION BLD/BLD COMPNT: CPT

## 2018-07-02 PROCEDURE — 86900 BLOOD TYPING SEROLOGIC ABO: CPT | Performed by: HOSPITALIST

## 2018-07-02 PROCEDURE — P9016 RBC LEUKOCYTES REDUCED: HCPCS | Performed by: HOSPITALIST

## 2018-07-02 PROCEDURE — 80048 BASIC METABOLIC PNL TOTAL CA: CPT | Performed by: ORTHOPAEDIC SURGERY

## 2018-07-02 PROCEDURE — 77030027138 HC INCENT SPIROMETER -A

## 2018-07-02 PROCEDURE — 86920 COMPATIBILITY TEST SPIN: CPT | Performed by: HOSPITALIST

## 2018-07-02 PROCEDURE — 36415 COLL VENOUS BLD VENIPUNCTURE: CPT | Performed by: ORTHOPAEDIC SURGERY

## 2018-07-02 PROCEDURE — 74011250637 HC RX REV CODE- 250/637: Performed by: PHYSICIAN ASSISTANT

## 2018-07-02 PROCEDURE — 74011250636 HC RX REV CODE- 250/636: Performed by: PHYSICIAN ASSISTANT

## 2018-07-02 PROCEDURE — 93971 EXTREMITY STUDY: CPT

## 2018-07-02 PROCEDURE — 74011000258 HC RX REV CODE- 258: Performed by: HOSPITALIST

## 2018-07-02 PROCEDURE — 85018 HEMOGLOBIN: CPT | Performed by: PHYSICIAN ASSISTANT

## 2018-07-02 PROCEDURE — 74011250636 HC RX REV CODE- 250/636: Performed by: HOSPITALIST

## 2018-07-02 PROCEDURE — 97116 GAIT TRAINING THERAPY: CPT

## 2018-07-02 PROCEDURE — 74011250637 HC RX REV CODE- 250/637: Performed by: HOSPITALIST

## 2018-07-02 RX ORDER — QUETIAPINE FUMARATE 100 MG/1
300 TABLET, FILM COATED ORAL 2 TIMES DAILY
Status: DISCONTINUED | OUTPATIENT
Start: 2018-07-02 | End: 2018-07-02 | Stop reason: SDUPTHER

## 2018-07-02 RX ORDER — DIPHENHYDRAMINE HYDROCHLORIDE 50 MG/ML
12.5 INJECTION, SOLUTION INTRAMUSCULAR; INTRAVENOUS
Status: DISCONTINUED | OUTPATIENT
Start: 2018-07-02 | End: 2018-07-03 | Stop reason: HOSPADM

## 2018-07-02 RX ORDER — SERTRALINE HYDROCHLORIDE 50 MG/1
100 TABLET, FILM COATED ORAL 2 TIMES DAILY
Status: DISCONTINUED | OUTPATIENT
Start: 2018-07-02 | End: 2018-07-03

## 2018-07-02 RX ORDER — ONDANSETRON 2 MG/ML
4 INJECTION INTRAMUSCULAR; INTRAVENOUS
Status: DISCONTINUED | OUTPATIENT
Start: 2018-07-02 | End: 2018-07-03 | Stop reason: HOSPADM

## 2018-07-02 RX ORDER — SERTRALINE HYDROCHLORIDE 50 MG/1
25 TABLET, FILM COATED ORAL DAILY
Status: DISCONTINUED | OUTPATIENT
Start: 2018-07-03 | End: 2018-07-02 | Stop reason: SDUPTHER

## 2018-07-02 RX ORDER — SERTRALINE HYDROCHLORIDE 100 MG/1
100 TABLET, FILM COATED ORAL 2 TIMES DAILY
COMMUNITY

## 2018-07-02 RX ORDER — ATORVASTATIN CALCIUM 20 MG/1
20 TABLET, FILM COATED ORAL
Status: DISCONTINUED | OUTPATIENT
Start: 2018-07-02 | End: 2018-07-03 | Stop reason: HOSPADM

## 2018-07-02 RX ORDER — SODIUM CHLORIDE 9 MG/ML
250 INJECTION, SOLUTION INTRAVENOUS AS NEEDED
Status: DISCONTINUED | OUTPATIENT
Start: 2018-07-02 | End: 2018-07-03 | Stop reason: HOSPADM

## 2018-07-02 RX ORDER — POLYETHYLENE GLYCOL 3350 17 G/17G
17 POWDER, FOR SOLUTION ORAL DAILY
Status: DISCONTINUED | OUTPATIENT
Start: 2018-07-02 | End: 2018-07-03 | Stop reason: HOSPADM

## 2018-07-02 RX ADMIN — PIPERACILLIN SODIUM,TAZOBACTAM SODIUM 3.38 G: 3; .375 INJECTION, POWDER, FOR SOLUTION INTRAVENOUS at 17:10

## 2018-07-02 RX ADMIN — TRAMADOL HYDROCHLORIDE 100 MG: 50 TABLET, FILM COATED ORAL at 12:25

## 2018-07-02 RX ADMIN — TRAMADOL HYDROCHLORIDE 100 MG: 50 TABLET, FILM COATED ORAL at 10:08

## 2018-07-02 RX ADMIN — HYDROMORPHONE HYDROCHLORIDE 2 MG: 2 TABLET ORAL at 06:16

## 2018-07-02 RX ADMIN — ACETAMINOPHEN 650 MG: 325 TABLET, FILM COATED ORAL at 17:10

## 2018-07-02 RX ADMIN — TRAMADOL HYDROCHLORIDE 100 MG: 50 TABLET, FILM COATED ORAL at 17:10

## 2018-07-02 RX ADMIN — SERTRALINE HYDROCHLORIDE 25 MG: 50 TABLET ORAL at 10:07

## 2018-07-02 RX ADMIN — ACETAMINOPHEN 650 MG: 325 TABLET, FILM COATED ORAL at 12:25

## 2018-07-02 RX ADMIN — ATORVASTATIN CALCIUM 20 MG: 20 TABLET, FILM COATED ORAL at 22:19

## 2018-07-02 RX ADMIN — QUETIAPINE FUMARATE 300 MG: 100 TABLET ORAL at 10:08

## 2018-07-02 RX ADMIN — TRAMADOL HYDROCHLORIDE 100 MG: 50 TABLET, FILM COATED ORAL at 22:19

## 2018-07-02 RX ADMIN — HYDROMORPHONE HYDROCHLORIDE 2 MG: 2 TABLET ORAL at 15:53

## 2018-07-02 RX ADMIN — DIPHENHYDRAMINE HYDROCHLORIDE 12.5 MG: 50 INJECTION, SOLUTION INTRAMUSCULAR; INTRAVENOUS at 12:11

## 2018-07-02 RX ADMIN — SODIUM CHLORIDE, SODIUM LACTATE, POTASSIUM CHLORIDE, AND CALCIUM CHLORIDE 70 ML/HR: 600; 310; 30; 20 INJECTION, SOLUTION INTRAVENOUS at 06:16

## 2018-07-02 RX ADMIN — SERTRALINE HYDROCHLORIDE 100 MG: 50 TABLET ORAL at 12:25

## 2018-07-02 RX ADMIN — SODIUM CHLORIDE 1000 MG: 900 INJECTION, SOLUTION INTRAVENOUS at 22:20

## 2018-07-02 RX ADMIN — SERTRALINE HYDROCHLORIDE 100 MG: 50 TABLET ORAL at 22:19

## 2018-07-02 RX ADMIN — PIPERACILLIN SODIUM,TAZOBACTAM SODIUM 3.38 G: 3; .375 INJECTION, POWDER, FOR SOLUTION INTRAVENOUS at 22:19

## 2018-07-02 RX ADMIN — QUETIAPINE FUMARATE 300 MG: 100 TABLET ORAL at 22:19

## 2018-07-02 RX ADMIN — SODIUM CHLORIDE 1000 MG: 900 INJECTION, SOLUTION INTRAVENOUS at 10:34

## 2018-07-02 RX ADMIN — CLONAZEPAM 0.5 MG: 0.5 TABLET ORAL at 14:57

## 2018-07-02 RX ADMIN — ACETAMINOPHEN 650 MG: 325 TABLET, FILM COATED ORAL at 10:07

## 2018-07-02 RX ADMIN — ACETAMINOPHEN 650 MG: 325 TABLET, FILM COATED ORAL at 22:19

## 2018-07-02 RX ADMIN — SODIUM CHLORIDE, SODIUM LACTATE, POTASSIUM CHLORIDE, AND CALCIUM CHLORIDE 500 ML: 600; 310; 30; 20 INJECTION, SOLUTION INTRAVENOUS at 01:05

## 2018-07-02 RX ADMIN — POLYETHYLENE GLYCOL 3350 17 G: 17 POWDER, FOR SOLUTION ORAL at 10:07

## 2018-07-02 RX ADMIN — PIPERACILLIN SODIUM,TAZOBACTAM SODIUM 3.38 G: 3; .375 INJECTION, POWDER, FOR SOLUTION INTRAVENOUS at 10:07

## 2018-07-02 NOTE — PROGRESS NOTES
Problem: Falls - Risk of  Goal: *Absence of Falls  Document Patricia Fall Risk and appropriate interventions in the flowsheet.    Outcome: Progressing Towards Goal  Fall Risk Interventions:  Mobility Interventions: Utilize walker, cane, or other assitive device, Patient to call before getting OOB, PT Consult for mobility concerns, PT Consult for assist device competence, Assess mobility with egress test, Communicate number of staff needed for ambulation/transfer         Medication Interventions: Evaluate medications/consider consulting pharmacy, Patient to call before getting OOB, Teach patient to arise slowly         History of Falls Interventions: Consult care management for discharge planning, Door open when patient unattended, Evaluate medications/consider consulting pharmacy, Investigate reason for fall, Room close to nurse's station (weakness)

## 2018-07-02 NOTE — ROUTINE PROCESS
Bedside and Verbal shift change report given to SERGIO King (oncoming nurse) by JAYLEN Cruz RN (offgoing nurse). Report included the following information SBAR, Kardex, Intake/Output and MAR.

## 2018-07-02 NOTE — PROGRESS NOTES
0035: BP 84/56   0050: Rechecked manually 90/56. Pt denies any pain, denies dizziness. Appears to be resting comfortably. Pt sleeping but awakens easily. 9079: Dr. Ismael Nino made aware. Received orders to administer 500 cc of Lactated Ringers over an hour. Received orders to consult the hospitalist for hypotension. 0105: Started LR bolus. 7059: /71  0610: Dr. Hyun Son on unit to see patient. Dr. Hyun Son to place order to transfuse PRBCs due to this mornings hemoglobin of 6.8. Shift summary: Pt A&Ox4, up with assistance, pain managed with PO Dilaudid, wife at bedside. Wife hyper-vigilant and anxious. Dressing to right knee clean, dry, and intact. Knee is swollen and warm to the touch.

## 2018-07-02 NOTE — PROGRESS NOTES
Pharmacy Dosing Services:  Zosyn Dosing    Indication: Skin and Soft Tissue Infection    Previous Regimen Zosyn 3.375 gm IV q8hr   Serum Creatinine Lab Results   Component Value Date/Time    Creatinine 1.26 07/02/2018 05:21 AM      Creatinine Clearance Estimated Creatinine Clearance: 78.7 mL/min (based on Cr of 1.26). BUN Lab Results   Component Value Date/Time    BUN 13 07/02/2018 05:21 AM         Dose administration notes:   Changed to Zosyn 3.375 gm IV q6hr per Renal Dosing Protocol. Plan:  Continue to monitor     Hernando Lundberg Senior  [phone]

## 2018-07-02 NOTE — CONSULTS
[address]  MR#: [id number]  : 1960  ACCOUNT #: [de-identified]   DATE OF SERVICE: 2018    REASON FOR MEDICAL CONSULTATION:  Hypotension. HISTORY OF PRESENT ILLNESS:  This is a 59-year-old gentleman who underwent a total knee replacement last Monday, a week ago. He developed some bullous blistering and swelling of the knee postoperatively per his significant other who is present with him. She noted since they have been home that the knee has worsened and swelling worsened blistering. He has been febrile. She brought him back to [facility] where he was then transferred to [facility] for a probable infection. He has been seen by the admitting orthopedic surgeon and I have been consulted as his blood pressure dropped early this morning. He responded to a fluid bolus, but his hemoglobin is back at 6.8, so he has acute blood loss anemia also and will need blood transfusion. PAST MEDICAL HISTORY:  Cognitive deficits, depression, anxiety, arthritis, coronary artery disease, previous MI with two stents, GERD, hypercholesterolemia, hypertension. PAST SURGICAL HISTORY:  Tooth removal, coronary stent placement, open knee procedure of the right knee, lower back procedure. FAMILY HISTORY:  No diabetes or coronary artery disease. SOCIAL HISTORY:  . He smokes a pack of cigarettes a day, has done for 40 years. No regular alcohol use, no illicit drugs. MEDICATIONS:  He is taking at home include Tylenol, Lipitor, Klonopin, Plavix, Colace, Prinivil, Seroquel, Zantac, Zoloft, Ultram.    ALLERGIES:  HE HAS NO MEDICATION ALLERGIES. REVIEW OF SYSTEMS:    GENERAL:  His wife notes fevers at home over the last few days, worsening swelling of the right knee with blistering lesions, redness, tenderness down the right leg. GASTROINTESTINAL:  No nausea, vomiting, diarrhea. RESPIRATORY:  No shortness of breath, wheezing, or coughing. CARDIOVASCULAR:  No chest pain, palpitations. MUSCULOSKELETAL:  Pain and swelling in the right knee and lower leg. GENITOURINARY:  No dysuria, hematuria or difficulty urinating. PHYSICAL EXAMINATION:  GENERAL:  This is a nontoxic appearing 60-year-old -American male who is pleasant and cooperative. He is oriented to person and place. He has some cognitive deficits per his wife, so she answers a lot of the questions for him, but he is engaging and follows commands appropriately. His oropharynx appears dry, no lesions. HEENT:  Sclerae are anicteric, conjunctivae pink. NECK:  Supple, no thyromegaly or lymphadenopathy. LUNGS:  Clear bilaterally. No rales, rhonchi, wheezes. HEART:  Regular rhythm, regular rate. No murmur, rub or gallop. ABDOMEN:  Soft, nontender, no distention, normal bowel sounds. LOWER EXTREMITIES:  Right knee is swollen, tender, stapled, blisters have been released. There is blistering skin around the surgical incision. There is swelling down the right calf and tenderness down to the foot from the knee. Distal pulses are palpable. LABORATORY DATA:  Show a hemoglobin of 6.8 this morning, hematocrit 21.0, platelets are 901. White count is 8.7. Electrolytes are normal.  Glucose is 162. He has been typed and crossed. EKG on the 25th showed normal sinus rhythm. ASSESSMENT:  1. Right surgical site infection with blistering lesions, pain and swelling. 2.  Acute blood loss anemia. 3.  Hypotension. 4.  History of cognitive deficit and depression and anxiety. 5.  History of coronary artery disease, status post 2 stents. PLAN:  I agree with holding his Plavix for now as he might need surgical intervention for this postoperative issue. In the meantime, consult to wound care. I agree that he had a fluid bolus earlier and can continue his fluids at 70 mL an hour for now as his blood pressure is improved. I stopped his lisinopril medication.   I recommend he get at least 1 unit of packed red blood cells following a CBC and basic metabolic panel daily. I would recommend stopping the Cipro orally as it may interact with his Seroquel and adding Zosyn IV for better broad spectrum coverage for the knee infection as well as continuing the vancomycin with pharmacy to dose that. I agree with continuing his Zoloft and Klonopin that he was on at home and resuming his cardiac medications outside of the antihypertensives. For DVT prophylaxis, I would recommend that he get Lovenox daily, but start tomorrow as there could be hematoma there, especially considering he was on Plavix, so actually what we will do is recommend that he just have the SCDS he has on now, holding any further blood thinners because again there could be acute blood loss anemia with a hematoma. I have ordered a stat venous duplex of the right lower extremity to rule out DVT. Orthopedics is the primary attending on the patient. We will follow along for their consulting reasons of hypotension and hope that we can be of service. Otherwise, I would expect that he may need to go back surgically for a debridement of the incision and evaluation for infected hematoma, but also obviously that is per orthopedic surgery and will follow along on this delightful gentleman's case, of course. He has stool softeners ordered. He does note he has not had a bowel movement in over a week, so a suppository was added yesterday and we have ordered MiraLax to start daily as well. Thank you very much for the consultation.       Marcy, MD       RI / BALWINDER  D: 07/02/2018 07:35     T: 07/02/2018 11:02  JOB #: [id number]

## 2018-07-02 NOTE — PROGRESS NOTES
Patient seen during IDRs. Question brought up by pharmacy in regards to patient's Zoloft dosing - per spouse, patient takes 100mg BID, not the 25mg daily we have listed in our system. Adjusted dosing. Per orthopedics notes, no surgical intervention planned at this juncture. Follow hgb levels. Continue with abx. Continue with plan set by Dr Digna Zaragoza. Will remain available for questions/concerns.

## 2018-07-02 NOTE — PROGRESS NOTES
1935 - Bedside shift change report given to Bill Grove RN (oncoming nurse) by Fior Irving RN (offgoing nurse). Report included the following information SBAR, Kardex, ED Summary, Intake/Output, MAR and Recent Results. Summary - no acute changes over course of shift. Room #: 327/01  Age: 63 y.o. Code status: Full  Allergies: No Known Allergies  Precautions: high fall risk    Admitting MD: John/Kelsy  Consults: P.T and wound care, hospitalist  Admission date: 6/30/2018  POD#: R TKA on 6/25  GLOS: TBD    VS & MEWS: Patient Vitals for the past 8 hrs:   Temp Pulse Resp BP SpO2   07/02/18 1429 99.8 °F (37.7 °C) 100 20 116/62 96 %   07/02/18 1315 97.3 °F (36.3 °C) 100 18 102/65 100 %   07/02/18 1220 98.7 °F (37.1 °C) (!) 101 20 111/45 97 %   07/02/18 1150 100.2 °F (37.9 °C) 99 18 115/64 96 %   07/02/18 1135 (!) 100.7 °F (38.2 °C) 100 20 111/68 97 %   07/02/18 1120 98.9 °F (37.2 °C) (!) 101 12 115/64 95 %       A&O: Person, Place and Situation    Admission dx:   Encounter Diagnoses     ICD-10-CM ICD-9-CM   1. Post-op pain G89.18 338.18   2. Skin bulla R23.8 709.8   3.  Postoperative surgical complication involving skin associated with dermatologic procedure, unspecified complication W74.85 872.05     Intermittent fever    Hospital problems:   Problem List Items Addressed This Visit     Skin bulla      Other Visit Diagnoses     Postoperative surgical complication involving skin associated with dermatologic procedure, unspecified complication            PMH:   Past Medical History:   Diagnosis Date    Acid reflux     Arthritis     CAD (coronary artery disease)     MI x 2 with stents    GERD (gastroesophageal reflux disease)     High cholesterol     Hypertension 2010       Activity: Out of bed with assistance; crutches; PT  Fall risk: high (3)  DVT prophy: SCD's or Sequential Compression Device    IV access: 22 SAMIA, 20 LFA    CV  Tele: No    Resp  O2: RA  Lung sounds: CTA  IS: yes, 2500    GI/  Urinary status: voiding, urinal    Abd  Last BM: 7/1  Bowel sounds: active  Diet: DIET REGULAR    Skin  Surgical Wound; 2+ nonpitting edema to RLE    Meds    Current Facility-Administered Medications:     0.9% sodium chloride infusion 250 mL, 250 mL, IntraVENous, PRN, Mitch Call MD    polyethylene glycol [facility]) packet 17 g, 17 g, Oral, DAILY, Mitch Call MD, 17 g at 07/02/18 1007    piperacillin-tazobactam (ZOSYN) 3.375 g in 0.9% sodium chloride (MBP/ADV) 100 mL MBP, 3.375 g, IntraVENous, Q6H, Mitch Call MD, Last Rate: 200 mL/hr at 07/02/18 1710, 3.375 g at 07/02/18 1710    ondansetron (ZOFRAN) injection 4 mg, 4 mg, IntraVENous, Q6H PRN, Mitch Call MD    diphenhydrAMINE (BENADRYL) injection 12.5 mg, 12.5 mg, IntraVENous, Q6H PRN, Maral Salas PA-C, 12.5 mg at 07/02/18 1211    sertraline (ZOLOFT) tablet 100 mg, 100 mg, Oral, BID, Rebecca Kenny PA-C, 100 mg at 07/02/18 1225    vancomycin (VANCOCIN) 1,000 mg in 0.9% sodium chloride (MBP/ADV) 250 mL adv, 1,000 mg, IntraVENous, Q12H, Austyn Oneil MD, Last Rate: 125 mL/hr at 07/02/18 1034, 1,000 mg at 07/02/18 1034    Vancomycin- pharmacy to dose, 1 Each, Other, Rx Dosing/Monitoring, Austyn Oneil MD    lactated Ringers infusion, 70 mL/hr, IntraVENous, CONTINUOUS, Austyn Oneil MD, Last Rate: 70 mL/hr at 07/02/18 0616, 70 mL/hr at 07/02/18 0616    clonazePAM (KlonoPIN) tablet 0.5 mg, 0.5 mg, Oral, DAILY, Austyn Oneil MD, 0.5 mg at 07/02/18 1457    bisacodyl (DULCOLAX) suppository 10 mg, 10 mg, Rectal, DAILY PRN, Arturo Guaman MD    acetaminophen (TYLENOL) tablet 650 mg, 650 mg, Oral, QID, Arturo Guaman MD, 650 mg at 07/02/18 1710    HYDROmorphone (DILAUDID) tablet 2-4 mg, 2-4 mg, Oral, Q6H PRN, Arturo Guaman MD, 2 mg at 07/02/18 1553    traMADol (ULTRAM) tablet 100 mg, 100 mg, Oral, QID, rAturo Guaman MD, 100 mg at 07/02/18 1710    QUEtiapine (SEROquel) tablet 300 mg, 300 mg, Oral, BID, Austyn Oneil MD, 300 mg at 07/02/18 1008    Recent results  Recent Results (from the past 12 hour(s))   CBC W/O DIFF    Collection Time: 07/02/18  5:21 AM   Result Value Ref Range    WBC 8.7 4.6 - 13.2 K/uL    RBC 2.87 (L) 4.70 - 5.50 M/uL    HGB 6.8 (L) 13.0 - 16.0 g/dL    HCT 21.0 (L) 36.0 - 48.0 %    MCV 73.2 (L) 74.0 - 97.0 FL    MCH 23.7 (L) 24.0 - 34.0 PG    MCHC 32.4 31.0 - 37.0 g/dL    RDW 15.1 (H) 11.6 - 14.5 %    PLATELET 659 124 - 306 K/uL    MPV 9.1 (L) 9.2 - 87.1 FL   METABOLIC PANEL, BASIC    Collection Time: 07/02/18  5:21 AM   Result Value Ref Range    Sodium 137 136 - 145 mmol/L    Potassium 3.9 3.5 - 5.5 mmol/L    Chloride 100 100 - 108 mmol/L    CO2 29 21 - 32 mmol/L    Anion gap 8 3.0 - 18 mmol/L    Glucose 162 (H) 74 - 99 mg/dL    BUN 13 7.0 - 18 MG/DL    Creatinine 1.26 0.6 - 1.3 MG/DL    BUN/Creatinine ratio 10 (L) 12 - 20      GFR est AA >60 >60 ml/min/1.73m2    GFR est non-AA 59 (L) >60 ml/min/1.73m2    Calcium 8.6 8.5 - 10.1 MG/DL   TYPE + CROSSMATCH    Collection Time: 07/02/18  6:55 AM   Result Value Ref Range    Crossmatch Expiration 07/05/2018     ABO/Rh(D) B POSITIVE     Antibody screen NEG     CALLED TO: ANGELINA GARCIA 3S ON 7/2/18 AT [address] [place] [address]     Unit number [id number]     Blood component type  LR     Unit division 00     Status of unit ISSUED     Crossmatch result Compatible        Radiology/other results: duplex U/S - No evidence of deep venous thrombosis in the [facility] lower extremity vein s assessed. Plan  IV abx, monitor H/H. IDR - adjust med regimen to align with home meds. Wound care in to see pt this afternoon - silver dsg applied.

## 2018-07-02 NOTE — WOUND CARE
Wound Care Progress Note       New consult placed by Dr. Mary Vanegas for leg wound     Assessment:   Communication: A non-verbal, medicated  Independently repositions. Diet: regular  Patient reports no pain. Pre-medicated for pain by RN. Bilateral heel, buttocks, and sacral skin intact and without erythema. Generalized edema and blanching erythema to lower legs and feet. 1. POA surgical knee wound, right (wound location & etiology) = clinically closed. Base is 100% of epithelialized tissue, scabbing noted, staples noted. scant exudate. Periwound with blisters . Margins are well approximated. Treatment: protective dressing      Wound Recommendations:    Keep area clean, dry and intact. Protective silver veil applied, may stay in place up to 7 days or at surgeons discretion. No open wound care provided as incision is closed. Skin Care & Pressure Relief Recommendations:  Minimize layers of linen/pads under patient to optimize support surface. Turn/reposition approximately every 2 hours and offload heels pillows or Prevalon boots.   Manage incontinence / promote continence; Proshield to buttocks and sacrum daily and as needed with incontinence care      Transition of Care: Plan to follow weekly and per product recommendations while admitted to hospital.

## 2018-07-02 NOTE — PROGRESS NOTES
Problem: Mobility Impaired (Adult and Pediatric)  Goal: *Acute Goals and Plan of Care (Insert Text)  Physical Therapy Goals   Initiated 7/2/2018 and to be accomplished within 3-5 day(s)  1. Patient will move from supine <> sit with S in prep for out of bed activity and change of position. 2.  Patient will perform sit<> stand with S with LRAD in prep for transfers/ambulation. 3.  Patient will transfer from bed <> chair with S with LRAD for time up in chair for completion of ADL activity. 4.  Patient will ambulate 150 feet with LRAD/S for improved functional mobility/safe discharge. 5.  Patient will ascend/descend 3-5 stairs with handrail with minimal assistance/contact guard assist for home re-entry as needed. Outcome: Progressing Towards Goal  physical Therapy EVALUATION    Patient: Caitlyn Michaud (58 y.o. male)  Date: 7/2/2018  Primary Diagnosis: Post-operative complication  Skin bulla        Precautions:  Fall    ASSESSMENT :  Based on the objective data described below, the patient presents with decrease independence w/ bed mobility, transfers, gait, and step negotiaiton. Pt reported 9/10 pain in R knee prior to session. Pt is oriented x 4 but appears confused at times during session. Pt demonstrates R LE non-pitting edema that is tender to palpate. Pt requires additional time to complete mobility task as pt is limited by her pain. Pt able to don pants per pt's request once sitting at the EOB. Once in standing w/ RW pt able to take 3 side steps [facility] however demonstrates inability to fully w/b on R LE and only demonstrating toe down at this time secondary to increase pain. Pt left in supine after session, call bell and tray in reach, B/L SCDs donned, nurse notified after session. Patient will benefit from skilled intervention to address the above impairments.   Patients rehabilitation potential is considered to be Fair  Factors which may influence rehabilitation potential include:   [] None noted  []         Mental ability/status  [x]         Medical condition  [x]         Home/family situation and support systems  [x]         Safety awareness  [x]         Pain tolerance/management  []         Other:      PLAN :  Recommendations and Planned Interventions:  [x]           Bed Mobility Training             [x]    Neuromuscular Re-Education  [x]           Transfer Training                   []    Orthotic/Prosthetic Training  [x]           Gait Training                          []    Modalities  [x]           Therapeutic Exercises          []    Edema Management/Control  [x]           Therapeutic Activities            [x]    Patient and Family Training/Education  []           Other (comment):    Frequency/Duration: Patient will be followed by physical therapy once/twice daily to address goals. Discharge Recommendations: Rehab vs Home Health pending pt's progress  Further Equipment Recommendations for Discharge: N/A     SUBJECTIVE:   Patient stated This knee really hurts.     OBJECTIVE DATA SUMMARY:     Past Medical History:   Diagnosis Date    Acid reflux     Arthritis     CAD (coronary artery disease)     MI x 2 with stents    GERD (gastroesophageal reflux disease)     High cholesterol     Hypertension 2010     Past Surgical History:   Procedure Laterality Date    HX CORONARY STENT PLACEMENT      HX HEENT      teeth removal    HX ORTHOPAEDIC Right     knee open procedure after accident    NEUROLOGICAL PROCEDURE UNLISTED      lower back     Barriers to Learning/Limitations: yes;  physical  Compensate with: Verbal Cues and Tactile Cues  Prior Level of Function/Home Situation:   Home Situation  Home Environment: Apartment  One/Two Story Residence: Other (Comment) (2nd floor)  # of Interior Steps: 15  Living Alone: No  Support Systems: Spouse/Significant Other/Partner  Patient Expects to be Discharged to[de-identified] Apartment  Current DME Used/Available at Home: Walker, rolling  Critical Behavior:  Neurologic State: Alert;Confused  Orientation Level: Appropriate for age;Oriented X4  Cognition: Appropriate decision making; Follows commands  Wound Knee Right-Periwound Skin Condition: Intact;Edematous  Strength:    Strength: Generally decreased, functional  Tone & Sensation:   Tone: Abnormal (R LE)  Sensation: Intact  Range Of Motion:  AROM: Generally decreased, functional  Functional Mobility:  Bed Mobility:  Supine to Sit: Minimum assistance  Sit to Supine: Contact guard assistance;Minimum assistance  Transfers:  Sit to Stand: Contact guard assistance (Bed elevated for assistance)  Stand to Sit: Contact guard assistance (Bed elevated for assistance)  Balance:   Sitting: Intact  Standing: Impaired; With support  Standing - Static: Fair  Standing - Dynamic : Fair  Ambulation/Gait Training:  Distance (ft): 1 Feet (ft)  Assistive Device: Gait belt;Walker, rolling  Ambulation - Level of Assistance: Contact guard assistance;Minimal assistance  Gait Description (WDL): Exceptions to WDL  Gait Abnormalities: Antalgic;Decreased step clearance  Right Side Weight Bearing: As tolerated  Base of Support: Shift to left  Stance: Right decreased  Speed/Suad: Slow  Step Length: Left shortened;Right shortened  Swing Pattern: Left asymmetrical;Right asymmetrical    Pain:  Pain Scale 1: Numeric (0 - 10)  Pain Intensity 1: 7  Pain Location 1: Leg  Pain Orientation 1: Lower; Left  Pain Description 1: Aching; Throbbing  Pain Intervention(s) 1: Medication (see MAR); Elevation;Cold pack; Emotional support; Family Support  Activity Tolerance:   Fair-  Please refer to the flowsheet for vital signs taken during this treatment.   After treatment:   []         Patient left in no apparent distress sitting up in chair  [x]         Patient left in no apparent distress in bed  [x]         Call bell left within reach  [x]         Nursing notified  [x]         Caregiver present (spouse)  []         Bed alarm activated    COMMUNICATION/EDUCATION:   [x]         Fall prevention education was provided and the patient/caregiver indicated understanding. [x]         Patient/family have participated as able in goal setting and plan of care. [x]         Patient/family agree to work toward stated goals and plan of care. []         Patient understands intent and goals of therapy, but is neutral about his/her participation. []         Patient is unable to participate in goal setting and plan of care. Thank you for this referral.  Fawad Hinton, PT   Time Calculation: 25 mins     Mobility:  Current  CJ= 20-39%   Goal  CI= 1-19%. The severity rating is based on the Other Based on functional assessment

## 2018-07-02 NOTE — PROGRESS NOTES
D/C Plan: [facility] [place] met with pt and his significant other at bedside to discuss plan of care options. Both would like pt to return home with [facility] services. CM offered FOC and pt would like to continue with [facility]. CM to continue to follow and assist with transition home with [facility] services. Please order PT/OT to assist with plan of care. Care Management Interventions  PCP Verified by CM: Yes  Mode of Transport at Discharge:  Other (see comment) (significant other)  Transition of Care Consult (CM Consult): [address]: No  Reason Outside [place]: Patient already serviced by other home care/hospice agency (current with [facility])  Health Maintenance Reviewed: Yes  Physical Therapy Consult: Yes  Occupational Therapy Consult: Yes  Current Support Network: Family [facility] [place], Other (significant other)  Confirm Follow Up Transport: Family  Discharge Location  Discharge Placement: Home with home health (current with [facility])

## 2018-07-02 NOTE — ROUTINE PROCESS
0745 - Bedside shift change report given to Sonia Redmond RN (oncoming nurse) by Leif Ko RN (offgoing nurse). Report included the following information SBAR, Kardex, ED Summary, Intake/Output, MAR and Recent Results.

## 2018-07-03 VITALS
WEIGHT: 225 LBS | BODY MASS INDEX: 31.5 KG/M2 | TEMPERATURE: 98.6 F | DIASTOLIC BLOOD PRESSURE: 87 MMHG | SYSTOLIC BLOOD PRESSURE: 136 MMHG | HEIGHT: 71 IN | RESPIRATION RATE: 18 BRPM | HEART RATE: 89 BPM | OXYGEN SATURATION: 100 %

## 2018-07-03 PROBLEM — T81.9XXA POST-OPERATIVE COMPLICATION: Status: RESOLVED | Noted: 2018-07-01 | Resolved: 2018-07-03

## 2018-07-03 PROBLEM — R23.8 SKIN BULLA: Status: RESOLVED | Noted: 2018-07-01 | Resolved: 2018-07-03

## 2018-07-03 LAB
ABO + RH BLD: NORMAL
ANION GAP SERPL CALC-SCNC: 7 MMOL/L (ref 3–18)
BLD PROD TYP BPU: NORMAL
BLOOD GROUP ANTIBODIES SERPL: NORMAL
BPU ID: NORMAL
BUN SERPL-MCNC: 12 MG/DL (ref 7–18)
BUN/CREAT SERPL: 10 (ref 12–20)
CALCIUM SERPL-MCNC: 8.8 MG/DL (ref 8.5–10.1)
CALLED TO:,BCALL1: NORMAL
CHLORIDE SERPL-SCNC: 101 MMOL/L (ref 100–108)
CO2 SERPL-SCNC: 30 MMOL/L (ref 21–32)
CREAT SERPL-MCNC: 1.18 MG/DL (ref 0.6–1.3)
CROSSMATCH RESULT,%XM: NORMAL
ERYTHROCYTE [DISTWIDTH] IN BLOOD BY AUTOMATED COUNT: 16 % (ref 11.6–14.5)
GLUCOSE SERPL-MCNC: 119 MG/DL (ref 74–99)
HCT VFR BLD AUTO: 23.4 % (ref 36–48)
HGB BLD-MCNC: 7.4 G/DL (ref 13–16)
MCH RBC QN AUTO: 23.6 PG (ref 24–34)
MCHC RBC AUTO-ENTMCNC: 31.6 G/DL (ref 31–37)
MCV RBC AUTO: 74.8 FL (ref 74–97)
PLATELET # BLD AUTO: 357 K/UL (ref 135–420)
PMV BLD AUTO: 9.8 FL (ref 9.2–11.8)
POTASSIUM SERPL-SCNC: 4.4 MMOL/L (ref 3.5–5.5)
RBC # BLD AUTO: 3.13 M/UL (ref 4.7–5.5)
SODIUM SERPL-SCNC: 138 MMOL/L (ref 136–145)
SPECIMEN EXP DATE BLD: NORMAL
STATUS OF UNIT,%ST: NORMAL
UNIT DIVISION, %UDIV: 0
WBC # BLD AUTO: 8.2 K/UL (ref 4.6–13.2)

## 2018-07-03 PROCEDURE — 74011250637 HC RX REV CODE- 250/637: Performed by: HOSPITALIST

## 2018-07-03 PROCEDURE — 74011250636 HC RX REV CODE- 250/636: Performed by: HOSPITALIST

## 2018-07-03 PROCEDURE — 85027 COMPLETE CBC AUTOMATED: CPT | Performed by: HOSPITALIST

## 2018-07-03 PROCEDURE — 36415 COLL VENOUS BLD VENIPUNCTURE: CPT | Performed by: HOSPITALIST

## 2018-07-03 PROCEDURE — 74011250636 HC RX REV CODE- 250/636: Performed by: ORTHOPAEDIC SURGERY

## 2018-07-03 PROCEDURE — 74011000258 HC RX REV CODE- 258: Performed by: HOSPITALIST

## 2018-07-03 PROCEDURE — 97116 GAIT TRAINING THERAPY: CPT

## 2018-07-03 PROCEDURE — 80048 BASIC METABOLIC PNL TOTAL CA: CPT | Performed by: HOSPITALIST

## 2018-07-03 RX ORDER — CLONAZEPAM 0.5 MG/1
0.5 TABLET ORAL
Status: DISCONTINUED | OUTPATIENT
Start: 2018-07-03 | End: 2018-07-03 | Stop reason: HOSPADM

## 2018-07-03 RX ORDER — ACETAMINOPHEN 325 MG/1
650 TABLET ORAL
Status: DISCONTINUED | OUTPATIENT
Start: 2018-07-03 | End: 2018-07-03 | Stop reason: HOSPADM

## 2018-07-03 RX ORDER — TRAMADOL HYDROCHLORIDE 50 MG/1
100 TABLET ORAL
Status: DISCONTINUED | OUTPATIENT
Start: 2018-07-03 | End: 2018-07-03 | Stop reason: HOSPADM

## 2018-07-03 RX ORDER — CLONAZEPAM 0.5 MG/1
0.5 TABLET ORAL
Status: DISCONTINUED | OUTPATIENT
Start: 2018-07-03 | End: 2018-07-03

## 2018-07-03 RX ADMIN — PIPERACILLIN SODIUM,TAZOBACTAM SODIUM 3.38 G: 3; .375 INJECTION, POWDER, FOR SOLUTION INTRAVENOUS at 09:07

## 2018-07-03 RX ADMIN — SODIUM CHLORIDE 1000 MG: 900 INJECTION, SOLUTION INTRAVENOUS at 10:01

## 2018-07-03 RX ADMIN — PIPERACILLIN SODIUM,TAZOBACTAM SODIUM 3.38 G: 3; .375 INJECTION, POWDER, FOR SOLUTION INTRAVENOUS at 04:10

## 2018-07-03 RX ADMIN — POLYETHYLENE GLYCOL 3350 17 G: 17 POWDER, FOR SOLUTION ORAL at 09:07

## 2018-07-03 RX ADMIN — TRAMADOL HYDROCHLORIDE 100 MG: 50 TABLET, FILM COATED ORAL at 14:02

## 2018-07-03 RX ADMIN — SODIUM CHLORIDE, SODIUM LACTATE, POTASSIUM CHLORIDE, AND CALCIUM CHLORIDE 70 ML/HR: 600; 310; 30; 20 INJECTION, SOLUTION INTRAVENOUS at 02:56

## 2018-07-03 NOTE — PROGRESS NOTES
Per girl friend reported, pt takes seroqual 150 mg once a day at night and kolnipin prn Q8hr .  not taking zolft, will change medication as he takes at home    He is alert and oriented,but got some confused per girl friend reported

## 2018-07-03 NOTE — PROGRESS NOTES
Problem: Falls - Risk of  Goal: *Absence of Falls  Document Patricia Fall Risk and appropriate interventions in the flowsheet.    Outcome: Progressing Towards Goal  Fall Risk Interventions:  Mobility Interventions: Communicate number of staff needed for ambulation/transfer, Patient to call before getting OOB, Utilize walker, cane, or other assitive device    Mentation Interventions: Adequate sleep, hydration, pain control, Door open when patient unattended, Update white board    Medication Interventions: Teach patient to arise slowly, Patient to call before getting OOB, Evaluate medications/consider consulting pharmacy         History of Falls Interventions: Door open when patient unattended, Evaluate medications/consider consulting pharmacy

## 2018-07-03 NOTE — PROGRESS NOTES
2358-Resting in bed with wife at bedside. White board updated. Call light and personal items within reach. 0040-Assessment complete. Pillows adjusted under right leg to support extremity without having a bend under the knee; explained to patient and female guest at bedside. Positive dorsalis pedis pulse, warm, sensation, capillary refill and no complaints of calf pain at this time. Incentive spirometer in use, needs encouragement. Periodic confusion and drowsiness, easily aroused and redirected. Guest at bedside in reclining chair. 0409-Still having periods of lucidness and disorientation, fiance stated that the patient does not take home medications as prescribed, but are given as needed, just not the full dose; informed of home medications as listed and what was scheduled. Bed changed and bath/wipes done. Stable. More cooperative and alert at this time. No other changes from initial assessment. 0700-Fiance at nursing station voicing concerns about level of sedation and patient affect. In room patient still drowsy, periodic disorientation, easily redirected; once engaged in conversation more lucid and detail oriented. 0723-Spoke to Dr. Luis F Damon via telephone while in room at bedside regarding concerns voiced by female guest/fiance. Informed of medications not always being given at home as prescribed per patient and fiance. Dr. Luis F Damon to come see patient and guest and place orders to address medications. Shift Summary: Uneventful shift, rested quietly with periodic confusion. Dr. Luis F Damon in to see patient and guest to address concerns and answer questions.

## 2018-07-03 NOTE — PROGRESS NOTES
Problem: Mobility Impaired (Adult and Pediatric)  Goal: *Acute Goals and Plan of Care (Insert Text)  Physical Therapy Goals   Initiated 7/2/2018 and to be accomplished within 3-5 day(s)  1. Patient will move from supine <> sit with S in prep for out of bed activity and change of position. 2.  Patient will perform sit<> stand with S with LRAD in prep for transfers/ambulation. 3.  Patient will transfer from bed <> chair with S with LRAD for time up in chair for completion of ADL activity. 4.  Patient will ambulate 150 feet with LRAD/S for improved functional mobility/safe discharge. 5.  Patient will ascend/descend 3-5 stairs with handrail with minimal assistance/contact guard assist for home re-entry as needed. Outcome: Progressing Towards Goal  physical Therapy TREATMENT    Patient: Bryson Matthews (70 y.o. male)  Date: 7/3/2018  Diagnosis: Post-operative complication  Skin bulla Post-operative complication  Precautions: Fall   Chart, physical therapy assessment, plan of care and goals were reviewed. PLOF:receiving  services, ambulatory with RW  ASSESSMENT:  Pt with pillows under R knee and RLE in ER. Stressed the importance of the RLE in neutral alignment and nothing under the knee only under the foot. Pt with ~40degrees of flexion in R knee in bed and during ambulation. Increased difficulty with flat foot strike, pt toe walking. Many short standing rest breaks due to pain and fatigue. Returned to supine in bed using hook tech to assist RLE into bed. Pillow placed under R ankle. Education: heel strike, gt seq, hook tech  Progression toward goals:  []      Improving appropriately and progressing toward goals  [x]      Improving slowly and progressing toward goals  []      Not making progress toward goals and plan of care will be adjusted     PLAN:  Patient continues to benefit from skilled intervention to address the above impairments.   Continue treatment per established plan of care.  Discharge Recommendations:  Home Health  Further Equipment Recommendations for Discharge:  rolling walker     SUBJECTIVE:   Patient stated I can make it.     OBJECTIVE DATA SUMMARY:   Critical Behavior:  Neurologic State: Alert  Orientation Level: Oriented to person, Oriented to place, Oriented to situation  Cognition: Decreased attention/concentration  Functional Mobility Training:  Bed Mobility:  Supine to Sit: Additional time  Sit to Supine: Contact guard assistance; Additional time  Transfers:  Sit to Stand: Stand-by assistance  Stand to Sit: Stand-by assistance  Balance:  Sitting: With support  Standing: Impaired; With support  Standing - Static: Fair  Standing - Dynamic : Fair  Ambulation/Gait Training:  Distance (ft): 60 Feet (ft)  Assistive Device: Gait belt;Walker, rolling  Ambulation - Level of Assistance: Contact guard assistance  Gait Abnormalities: Antalgic;Decreased step clearance; Toe walking; Step to gait  Right Side Weight Bearing: As tolerated  Speed/Suad: Slow;Delayed  GCODE:Mobility [id number] Current  CJ= 20-39%   Goal  CI= 1-19%. The severity rating is based on the Level of Assistance required for Functional Mobility and ADLs. Pain:  Pre:7  Post:10  Pain Scale 1: Numeric (0 - 10)  Pain Location 1: Knee  Pain Orientation 1: Right  Pain Intervention(s) 1: Repositioned; Rest  Activity Tolerance:   Fair(-)  Please refer to the flowsheet for vital signs taken during this treatment.   After treatment:   [] Patient left in no apparent distress sitting up in chair  [x] Patient left in no apparent distress in bed  [x] Call bell left within reach  [x] Nursing notified  [x] Caregiver present  [] Bed alarm activated      [place] PTA   Time Calculation: 17 mins

## 2018-07-03 NOTE — DISCHARGE SUMMARY
Discharge Summary    Patient: Paul Corral               Sex: male          DOA: 6/30/2018         YOB: 1960      Age:  63 y.o.        LOS:  LOS: 2 days                Admit Date: 6/30/2018    Discharge Date: 7/3/2018    Admission Diagnoses: Post-operative complication  Skin bulla    Discharge Diagnoses:    Problem List as of 7/3/2018  Date Reviewed: 6/27/2018          Codes Class Noted - Resolved    RESOLVED: Skin bulla ICD-10-CM: R23.8  ICD-9-CM: 709.8  7/1/2018 - 7/3/2018        * (Principal)RESOLVED: Post-operative complication QNT-42-XX: P70. 9XXA  ICD-9-CM: 998.9  7/1/2018 - 7/3/2018        RESOLVED: DJD (degenerative joint disease) of knee ICD-10-CM: M17.10  ICD-9-CM: 715.36  6/25/2018 - 6/27/2018              Discharge Condition: Good    Hospital Course: ivab    Consults: Hospitalist    Significant Diagnostic Studies: normal    Discharge Medications:     Current Discharge Medication List      CONTINUE these medications which have NOT CHANGED    Details   sertraline (ZOLOFT) 100 mg tablet Take 100 mg by mouth two (2) times a day. docusate sodium (COLACE) 100 mg capsule Take 100 mg by mouth four (4) times daily as needed for Constipation. QUEtiapine (SEROQUEL) 300 mg tablet Take 300 mg by mouth two (2) times a day. Indications: DEPRESSION TREATMENT ADJUNCT      oxyCODONE IR (ROXICODONE) 5 mg immediate release tablet Take 2 Tabs by mouth every four (4) hours as needed. Max Daily Amount: 60 mg.  Qty: 60 Tab, Refills: 0    Associated Diagnoses: Primary osteoarthritis of both knees      traMADol (ULTRAM) 50 mg tablet Take 2 Tabs by mouth four (4) times daily. Max Daily Amount: 400 mg. Qty: 60 Tab, Refills: 0    Associated Diagnoses: Primary osteoarthritis of both knees      calcium polycarbophil (FIBER-TABS) 625 mg tablet Take 625 mg by mouth daily. !! acetaminophen (TYLENOL) 325 mg tablet Take  by mouth every four (4) hours as needed for Pain.       atorvastatin (LIPITOR) 20 mg tablet Take 20 mg by mouth nightly. lisinopril (PRINIVIL, ZESTRIL) 5 mg tablet Take 5 mg by mouth daily. raNITIdine (ZANTAC) 300 mg tablet Take 300 mg by mouth daily. !! acetaminophen (TYLENOL) 325 mg tablet Take 2 Tabs by mouth every four (4) hours as needed. Qty: 120 Tab, Refills: 0    Associated Diagnoses: Primary osteoarthritis of both knees      clonazePAM (KLONOPIN) 0.5 mg tablet Take 0.5 mg by mouth two (2) times daily as needed. clopidogrel (PLAVIX) 75 mg tab Take 75 mg by mouth. nitroglycerin (NITROSTAT) 0.4 mg SL tablet 0.4 mg by SubLINGual route every five (5) minutes as needed for Chest Pain. !! - Potential duplicate medications found. Please discuss with provider.           Activity: Activity as tolerated    Diet: Regular Diet    Wound Care: Keep wound clean and dry    Follow-up: 2 wks

## 2018-07-03 NOTE — ROUTINE PROCESS
Bedside and Verbal shift change report given to Veda Quiroz RN (oncoming nurse) by Daren North RN (offgoing nurse). Report included the following information SBAR and Kardex.

## 2018-07-03 NOTE — ROUTINE PROCESS
Bedside and Verbal shift change report given to Dmitry Saxena RN (oncoming nurse) by Alon Schwartz RN (offgoing nurse). Report included the following information SBAR and Kardex.

## 2018-07-03 NOTE — PROGRESS NOTES
Hospitalist Progress Note    Patient: Kia Quintero MRN: [id number]  CSN: [id number]    YOB: 1960  Age: 63 y.o. Sex: male    DOA: 6/30/2018 LOS:  LOS: 2 days          Chief Complaint:    Consult for Hypotension    Assessment/Plan     1. Surgical Site Complication - Right Knee  2. Acute Blood Loss Anemia  3. Hypotension  4. Hx of Cognitive Deficit, Depression, Anxiety  5. Hx of CAD s/p 2 Stents    1. Patient is to be discharged per orthopedics with outpatient follow up. 2. Resolved, patient received blood transfusion while hospitalized with appropriate response in hemoglobin levels. 3. Resolved after fluid bolus and infusion as well as blood transfusion. 4. Strongly recommend that the patient follow up with their PCP as an outpatient to determine his medication regimen and develop a better regimen and monitoring. 5. As above, strongly recommend that he follow up with his PCP and cardiologist for better home management of his medications. Patient Active Problem List   Diagnosis Code   (none) - all problems resolved or deleted       Subjective:    Patient said he is doing well today  Per significant other, some confusion    Review of systems:    Constitutional: denies fevers, chills, myalgias  Respiratory: denies SOB, cough  Cardiovascular: denies chest pain, palpitations  Gastrointestinal: denies nausea, vomiting, diarrhea      Vital signs/Intake and Output:  Visit Vitals    /87 (BP 1 Location: Right arm, BP Patient Position: At rest)    Pulse 89    Temp 98.6 °F (37 °C)    Resp 18    Ht 5' 11\" (1.803 m)    Wt 102.1 kg (225 lb)    SpO2 100%    BMI 31.38 kg/m2     Current Shift:     Last three shifts:  07/01 1901 - 07/03 0700  In: 2764.1 [P.O.:240;  I.V.:2524.1]  Out: 2761.5 [Urine:2761.5]    Exam:    General: Well developed, alert, NAD, OX3  Head/Neck: NCAT, supple, No masses, No lymphadenopathy  CVS:Regular rate and rhythm, no M/R/G, S1/S2 heard, no thrill  Lungs:Clear to auscultation bilaterally, no wheezes, rhonchi, or rales  Abdomen: Soft, Nontender, No distention, Normal Bowel sounds, No hepatomegaly  Extremities: Right knee wrapped. Trace edema to RLE. Skin:normal texture and turgor, no rashes, no lesions  Neuro:grossly normal , follows commands  Psych:appropriate                Labs: Results:       Chemistry Recent Labs      07/03/18   0510  07/02/18   0521  07/01/18   0958   GLU  119*  162*  188*   NA  138  137  134*   K  4.4  3.9  4.3   CL  101  100  98*   CO2  30  29  28   BUN  12  13  13   CREA  1.18  1.26  1.20   CA  8.8  8.6  8.2*   AGAP  7  8  8   BUCR  10*  10*  11*   AP   --    --   87   TP   --    --   7.1   ALB   --    --   2.5*   GLOB   --    --   4.6*   AGRAT   --    --   0.5*      CBC w/Diff Recent Labs      07/03/18   0510  07/02/18   1917  07/02/18   0521   WBC  8.2   --   8.7   RBC  3.13*   --   2.87*   HGB  7.4*  7.6*  6.8*   HCT  23.4*  23.0*  21.0*   PLT  357   --   289      Cardiac Enzymes No results for input(s): CPK, CKND1, ZAIN in the last 72 hours. No lab exists for component: CKRMB, TROIP   Coagulation No results for input(s): PTP, INR, APTT in the last 72 hours. No lab exists for component: INREXT    Lipid Panel No results found for: CHOL, CHOLPOCT, CHOLX, CHLST, CHOLV, 533007, HDL, LDL, LDLC, DLDLP, 086774, VLDLC, VLDL, TGLX, TRIGL, TRIGP, TGLPOCT, CHHD, CHHDX   BNP No results for input(s): BNPP in the last 72 hours.    Liver Enzymes Recent Labs      07/01/18   0958   TP  7.1   ALB  2.5*   AP  87   SGOT  25      Thyroid Studies No results found for: T4, T3U, TSH, TSHEXT     Procedures/imaging: see electronic medical records for all procedures/Xrays and details which were not copied into this note but were reviewed prior to creation of [address], PA-C

## 2018-07-03 NOTE — DISCHARGE INSTRUCTIONS
Infection After Surgery: Care Instructions  Your Care Instructions  After surgery, an infection is always possible. It doesn't mean that the surgery didn't go well. Because an infection can be serious, your doctor has taken steps to manage it. Your doctor checked the infection and cleaned it if necessary. He or she may have made an opening in the area so that the pus can drain out. You may have gauze in the cut so that the area will stay open and keep draining. You may need antibiotics. You will need to follow up with your doctor to make sure the infection has gone away. Follow-up care is a key part of your treatment and safety. Be sure to make and go to all appointments, and call your doctor if you are having problems. It's also a good idea to know your test results and keep a list of the medicines you take. How can you care for yourself at home? · Make sure your surgeon knows that you saw a doctor about the infection. · If your doctor prescribed antibiotics, take them as directed. Do not stop taking them just because you feel better. You need to take the full course of antibiotics. · Ask your doctor if you can take an over-the-counter pain medicine, such as acetaminophen (Tylenol), ibuprofen (Advil, Motrin), or naproxen (Aleve). Be safe with medicines. Read and follow all instructions on the label. · Do not take two or more pain medicines at the same time unless the doctor told you to. Many pain medicines have acetaminophen, which is Tylenol. Too much acetaminophen (Tylenol) can be harmful. · Prop up the area on a pillow anytime you sit or lie down during the next 3 days. Try to keep it above the level of your heart. This will help reduce swelling. · Keep the skin clean and dry. · If you have a bandage, keep it clean and dry. · You may have a dressing over the cut (incision). A dressing helps the incision heal and protects it. Your doctor will tell you how to take care of this.  You can expect drainage from the wound. · If your doctor told you how to care for your incision, follow your doctor's instructions. If you did not get instructions, follow this general advice:  ¨ Wash around the incision with clean water 2 times a day. Don't use hydrogen peroxide or alcohol, which can slow healing. When should you call for help? Call your doctor now or seek immediate medical care if:  ? · You have signs that your infection is getting worse, such as:  ¨ Increased pain, swelling, warmth, or redness in the area. ¨ Red streaks leading from the area. ¨ Pus draining from the wound. ¨ A new or higher fever. ? Watch closely for changes in your health, and be sure to contact your doctor if you have any problems. Where can you learn more? Go to http://nathaly-meghana.info/. Enter C340 in the search box to learn more about \"Infection After Surgery: Care Instructions. \"  Current as of: March 20, 2017  Content Version: 11.4  © 8486-4542 [employer]. Care instructions adapted under license by [employer] (which disclaims liability or warranty for this information). If you have questions about a medical condition or this instruction, always ask your healthcare professional. [address] any warranty or liability for your use of this information.

## 2018-07-03 NOTE — PROGRESS NOTES
D/C Plan: [facility] 7/3/18    Noted pt is to be discharged today with [facility] services through [facility]. Pt's significant other will transport pt home today. No other plan of care needs have been identified. Care Management Interventions  PCP Verified by CM: Yes  Mode of Transport at Discharge:  Other (see comment) (significant other)  Transition of Care Consult (CM Consult): [address]: No  Reason Outside [place]: Patient already serviced by other home care/hospice agency (current with [facility])  Health Maintenance Reviewed: Yes  Physical Therapy Consult: Yes  Occupational Therapy Consult: Yes  Current Support Network: Family Lives [place], Other (significant other)  Confirm Follow Up Transport: Family  Discharge Location  Discharge Placement: Home with home health (current with [facility])

## 2018-07-25 ENCOUNTER — HOSPITAL ENCOUNTER (INPATIENT)
Age: 58
LOS: 4 days | Discharge: HOME HEALTH | DRG: 863 | End: 2018-07-30
Attending: EMERGENCY MEDICINE | Admitting: ORTHOPAEDIC SURGERY
Payer: MEDICARE

## 2018-07-25 DIAGNOSIS — F99 PSYCHIATRIC DISORDER: ICD-10-CM

## 2018-07-25 DIAGNOSIS — T14.8XXA WOUND INFECTION: Primary | ICD-10-CM

## 2018-07-25 DIAGNOSIS — L08.9 WOUND INFECTION: Primary | ICD-10-CM

## 2018-07-25 LAB
ALBUMIN SERPL-MCNC: 3.3 G/DL (ref 3.4–5)
ALBUMIN/GLOB SERPL: 0.8 {RATIO} (ref 0.8–1.7)
ALP SERPL-CCNC: 130 U/L (ref 45–117)
ALT SERPL-CCNC: 35 U/L (ref 16–61)
ANION GAP SERPL CALC-SCNC: 9 MMOL/L (ref 3–18)
APTT PPP: 34.8 SEC (ref 23–36.4)
AST SERPL-CCNC: 19 U/L (ref 15–37)
BASOPHILS # BLD: 0 K/UL (ref 0–0.1)
BASOPHILS NFR BLD: 0 % (ref 0–2)
BILIRUB SERPL-MCNC: 0.4 MG/DL (ref 0.2–1)
BUN SERPL-MCNC: 15 MG/DL (ref 7–18)
BUN/CREAT SERPL: 12 (ref 12–20)
CALCIUM SERPL-MCNC: 9.3 MG/DL (ref 8.5–10.1)
CHLORIDE SERPL-SCNC: 100 MMOL/L (ref 100–108)
CK MB CFR SERPL CALC: NORMAL % (ref 0–4)
CK MB SERPL-MCNC: <1 NG/ML (ref 5–25)
CK SERPL-CCNC: 81 U/L (ref 39–308)
CO2 SERPL-SCNC: 29 MMOL/L (ref 21–32)
CREAT SERPL-MCNC: 1.28 MG/DL (ref 0.6–1.3)
DIFFERENTIAL METHOD BLD: ABNORMAL
EOSINOPHIL # BLD: 0.2 K/UL (ref 0–0.4)
EOSINOPHIL NFR BLD: 3 % (ref 0–5)
ERYTHROCYTE [DISTWIDTH] IN BLOOD BY AUTOMATED COUNT: 18.3 % (ref 11.6–14.5)
ERYTHROCYTE [SEDIMENTATION RATE] IN BLOOD: 24 MM/HR (ref 0–20)
GLOBULIN SER CALC-MCNC: 4.3 G/DL (ref 2–4)
GLUCOSE SERPL-MCNC: 118 MG/DL (ref 74–99)
HCT VFR BLD AUTO: 35.3 % (ref 36–48)
HGB BLD-MCNC: 11.1 G/DL (ref 13–16)
INR PPP: 1 (ref 0.8–1.2)
LACTATE SERPL-SCNC: 1.7 MMOL/L (ref 0.4–2)
LYMPHOCYTES # BLD: 2.7 K/UL (ref 0.9–3.6)
LYMPHOCYTES NFR BLD: 35 % (ref 21–52)
MCH RBC QN AUTO: 24.2 PG (ref 24–34)
MCHC RBC AUTO-ENTMCNC: 31.4 G/DL (ref 31–37)
MCV RBC AUTO: 76.9 FL (ref 74–97)
MONOCYTES # BLD: 0.5 K/UL (ref 0.05–1.2)
MONOCYTES NFR BLD: 6 % (ref 3–10)
NEUTS SEG # BLD: 4.2 K/UL (ref 1.8–8)
NEUTS SEG NFR BLD: 56 % (ref 40–73)
PLATELET # BLD AUTO: 261 K/UL (ref 135–420)
PMV BLD AUTO: 9.8 FL (ref 9.2–11.8)
POTASSIUM SERPL-SCNC: 3.8 MMOL/L (ref 3.5–5.5)
PROT SERPL-MCNC: 7.6 G/DL (ref 6.4–8.2)
PROTHROMBIN TIME: 13.1 SEC (ref 11.5–15.2)
RBC # BLD AUTO: 4.59 M/UL (ref 4.7–5.5)
SODIUM SERPL-SCNC: 138 MMOL/L (ref 136–145)
TROPONIN I SERPL-MCNC: <0.02 NG/ML (ref 0–0.06)
WBC # BLD AUTO: 7.6 K/UL (ref 4.6–13.2)

## 2018-07-25 PROCEDURE — 83605 ASSAY OF LACTIC ACID: CPT | Performed by: EMERGENCY MEDICINE

## 2018-07-25 PROCEDURE — 87186 SC STD MICRODIL/AGAR DIL: CPT | Performed by: EMERGENCY MEDICINE

## 2018-07-25 PROCEDURE — 96365 THER/PROPH/DIAG IV INF INIT: CPT

## 2018-07-25 PROCEDURE — 87040 BLOOD CULTURE FOR BACTERIA: CPT | Performed by: EMERGENCY MEDICINE

## 2018-07-25 PROCEDURE — 84484 ASSAY OF TROPONIN QUANT: CPT | Performed by: EMERGENCY MEDICINE

## 2018-07-25 PROCEDURE — 96375 TX/PRO/DX INJ NEW DRUG ADDON: CPT

## 2018-07-25 PROCEDURE — 74011250636 HC RX REV CODE- 250/636: Performed by: EMERGENCY MEDICINE

## 2018-07-25 PROCEDURE — 74011000258 HC RX REV CODE- 258: Performed by: EMERGENCY MEDICINE

## 2018-07-25 PROCEDURE — 83036 HEMOGLOBIN GLYCOSYLATED A1C: CPT | Performed by: INTERNAL MEDICINE

## 2018-07-25 PROCEDURE — 85652 RBC SED RATE AUTOMATED: CPT | Performed by: EMERGENCY MEDICINE

## 2018-07-25 PROCEDURE — 87077 CULTURE AEROBIC IDENTIFY: CPT | Performed by: EMERGENCY MEDICINE

## 2018-07-25 PROCEDURE — 99283 EMERGENCY DEPT VISIT LOW MDM: CPT

## 2018-07-25 PROCEDURE — 85025 COMPLETE CBC W/AUTO DIFF WBC: CPT | Performed by: EMERGENCY MEDICINE

## 2018-07-25 PROCEDURE — 85610 PROTHROMBIN TIME: CPT | Performed by: EMERGENCY MEDICINE

## 2018-07-25 PROCEDURE — 87070 CULTURE OTHR SPECIMN AEROBIC: CPT | Performed by: EMERGENCY MEDICINE

## 2018-07-25 PROCEDURE — 86140 C-REACTIVE PROTEIN: CPT | Performed by: EMERGENCY MEDICINE

## 2018-07-25 PROCEDURE — 80053 COMPREHEN METABOLIC PANEL: CPT | Performed by: EMERGENCY MEDICINE

## 2018-07-25 PROCEDURE — 85730 THROMBOPLASTIN TIME PARTIAL: CPT | Performed by: EMERGENCY MEDICINE

## 2018-07-25 RX ORDER — SODIUM CHLORIDE 900 MG/100ML
INJECTION INTRAVENOUS
Status: DISPENSED
Start: 2018-07-25 | End: 2018-07-26

## 2018-07-25 RX ORDER — MORPHINE SULFATE 4 MG/ML
4 INJECTION INTRAVENOUS
Status: COMPLETED | OUTPATIENT
Start: 2018-07-25 | End: 2018-07-25

## 2018-07-25 RX ADMIN — PIPERACILLIN SODIUM,TAZOBACTAM SODIUM 3.38 G: 3; .375 INJECTION, POWDER, FOR SOLUTION INTRAVENOUS at 23:14

## 2018-07-25 RX ADMIN — MORPHINE SULFATE 4 MG: 4 INJECTION INTRAVENOUS at 23:14

## 2018-07-25 NOTE — IP AVS SNAPSHOT
[address] [place] [address] 
[phone] Patient: Madhu Max MRN: [id number] QDQ:3/32/[id number] About your hospitalization You were admitted on:  July 26, 2018 You last received care in the:  [address] [place] [address] You were discharged on:  July 30, 2018 Why you were hospitalized Your primary diagnosis was:  Superficial Postoperative Wound Infection Your diagnoses also included:  Diabetes Mellitus Type 2, Controlled (Hcc), Cad (Coronary Artery Disease), Hypertension, Psychiatric Disorder Follow-up Information Follow up With Details Comments Contact [address] to continue managing your healthcare needs. [phone] Mariama Villafana MD    [address] [place], [place] [address] 
[phone] Neisha Soriano MD On 8/30/2018 Follow up appointment scheduled for August 7, 2018 at 10:30 a.m. [address] [address] 
[phone] Discharge Orders None A check briana indicates which time of day the medication should be taken. My Medications START taking these medications Instructions Each Dose to Equal  
 Morning Noon Evening Bedtime  
 clindamycin 150 mg capsule Commonly known as:  CLEOCIN Your last dose was: Your next dose is: Take 1 Cap by mouth every six (6) hours for 10 days. Indications: Skin and Skin Structure Infection 150 mg CHANGE how you take these medications Instructions Each Dose to Equal  
 Morning Noon Evening Bedtime  
 acetaminophen 325 mg tablet Commonly known as:  TYLENOL What changed:   
- how much to take - when to take this 
- reasons to take this - Another medication with the same name was removed. Continue taking this medication, and follow the directions you see here. Your last dose was: Your next dose is: Take 2 Tabs by mouth every six (6) hours as needed. 650 mg  
    
   
   
   
  
 oxyCODONE IR 5 mg immediate release tablet Commonly known as:  Harriet Wells What changed:  how much to take Your last dose was: Your next dose is: Take 1 Tab by mouth every four (4) hours as needed. Max Daily Amount: 30 mg.  
 5 mg * traMADol 50 mg tablet Commonly known as:  ULTRAM  
What changed:  Another medication with the same name was added. Make sure you understand how and when to take each. Your last dose was: Your next dose is: Take 2 Tabs by mouth four (4) times daily. Max Daily Amount: 400 mg.  
 100 mg  
    
   
   
   
  
 * traMADol 50 mg tablet Commonly known as:  ULTRAM  
What changed: You were already taking a medication with the same name, and this prescription was added. Make sure you understand how and when to take each. Your last dose was: Your next dose is: Take 2 Tabs by mouth every six (6) hours. Max Daily Amount: 400 mg.  
 100 mg * Notice: This list has 2 medication(s) that are the same as other medications prescribed for you. Read the directions carefully, and ask your doctor or other care provider to review them with you. CONTINUE taking these medications Instructions Each Dose to Equal  
 Morning Noon Evening Bedtime  
 atorvastatin 20 mg tablet Commonly known as:  LIPITOR Your last dose was: Your next dose is: Take 20 mg by mouth nightly. 20 mg  
    
   
   
   
  
 COLACE 100 mg capsule Generic drug:  docusate sodium Your last dose was: Your next dose is: Take 100 mg by mouth four (4) times daily as needed for Constipation. 100 mg FIBER-TABS 625 mg tablet Generic drug:  calcium polycarbophil Your last dose was: Your next dose is: Take 625 mg by mouth daily. 625 mg  
    
   
   
   
  
 KlonoPIN 0.5 mg tablet Generic drug:  clonazePAM  
   
Your last dose was: Your next dose is: Take 0.5 mg by mouth two (2) times daily as needed. 0.5 mg  
    
   
   
   
  
 lisinopril 5 mg tablet Commonly known as:  Siria Loss Your last dose was: Your next dose is: Take 5 mg by mouth daily. 5 mg  
    
   
   
   
  
 nitroglycerin 0.4 mg SL tablet Commonly known as:  NITROSTAT Your last dose was: Your next dose is: 0.4 mg by SubLINGual route every five (5) minutes as needed for Chest Pain. 0.4 mg  
    
   
   
   
  
 PLAVIX 75 mg Tab Generic drug:  clopidogrel Your last dose was: Your next dose is: Take 75 mg by mouth. 75 mg  
    
   
   
   
  
 QUEtiapine 300 mg tablet Commonly known as:  SEROquel Your last dose was: Your next dose is: Take 300 mg by mouth two (2) times a day. Indications: DEPRESSION TREATMENT ADJUNCT  
 300 mg  
    
   
   
   
  
 raNITIdine 300 mg tablet Commonly known as:  ZANTAC Your last dose was: Your next dose is: Take 300 mg by mouth daily. 300 mg ZOLOFT 100 mg tablet Generic drug:  sertraline Your last dose was: Your next dose is: Take 100 mg by mouth two (2) times a day. 100 mg Where to Get Your Medications Information on where to get these meds will be given to you by the nurse or doctor. ! Ask your nurse or doctor about these medications  
  acetaminophen 325 mg tablet  
 clindamycin 150 mg capsule  
 oxyCODONE IR 5 mg immediate release tablet  
 traMADol 50 mg tablet Opioid Education  Prescription Opioids: What You Need to Know: 
 
 
 
F-face looks uneven A-arms unable to move or move unevenly S-speech slurred or non-existent T-time-call 911 as soon as signs and symptoms begin-DO NOT go Back to bed or wait to see if you get better-TIME IS BRAIN. Warning Signs of HEART ATTACK Call 911 if you have these symptoms: 
? Chest discomfort. Most heart attacks involve discomfort in the center of the chest that lasts more than a few minutes, or that goes away and comes back. It can feel like uncomfortable pressure, squeezing, fullness, or pain. ? Discomfort in other areas of the upper body. Symptoms can include pain or discomfort in one or both arms, the back, neck, jaw, or stomach. ? Shortness of breath with or without chest discomfort. ? Other signs may include breaking out in a cold sweat, nausea, or lightheadedness. Don't wait more than five minutes to call [address]! Fast action can save your life. Calling 911 is almost always the fastest way to get lifesaving treatment. Emergency Medical Services staff can begin treatment when they arrive  up to an hour sooner than if someone gets to the hospital by car. The discharge information has been reviewed with the patient. The patient verbalized understanding. Discharge medications reviewed with the patient and appropriate educational materials and side effects teaching were provided. ___________________________________________________________________________________________________________________________________ MyChart Announcement We are excited to announce that we are making your provider's discharge notes available to you in Save22. You will see these notes when they are completed and signed by the physician that discharged you from your recent hospital stay. If you have any questions or concerns about any information you see in Save22, please call the Health Information Department where you were seen or reach out to your Primary Care Provider for more information about your plan of care. Introducing [facility]! [employer] introduces Save22 patient portal. Now you can access parts of your medical record, email your doctor's office, and request medication refills online. 1. In your internet browser, go to https://Rank By Search. [employer] ([employer])/Rank By Search 2. Click on the First Time User? Click Here link in the Sign In box. You will see the New Member Sign Up page. 3. Enter your Save22 Access Code exactly as it appears below. You will not need to use this code after youve completed the sign-up process. If you do not sign up before the expiration date, you must request a new code. · Save22 Access Code: [email or web address] Expires: 8/27/2018  8:56 AM 
 
4. Enter the last four digits of your Social Security Number (xxxx) and Date of Birth (mm/dd/yyyy) as indicated and click Submit. You will be taken to the next sign-up page. 5. Create a Save22 ID. This will be your Save22 login ID and cannot be changed, so think of one that is secure and easy to remember. 6. Create a Save22 password. You can change your password at any time. 7. Enter your Password Reset Question and Answer. This can be used at a later time if you forget your password. 8. Enter your e-mail address. You will receive e-mail notification when new information is available in [address] 9. Click Sign Up. You can now view and download portions of your medical record.  
10. Click the Download Summary menu link to download a portable copy of your medical information. If you have questions, please visit the Frequently Asked Questions section of the [employer] website. Remember, [employer] is NOT to be used for urgent needs. For medical emergencies, dial 911. Now available from your iPhone and Android! Introducing Jed Butterfield As a [employer] patient, I wanted to make you aware of our electronic visit tool called Jed Butterfield. [employer] 24/7 allows you to connect within minutes with a medical provider 24 hours a day, seven days a week via a mobile device or tablet or logging into a secure website from your computer. You can access Jed Butterfield from anywhere in the [place]. A virtual visit might be right for you when you have a simple condition and feel like you just dont want to get out of bed, or cant get away from work for an appointment, when your regular [employer] provider is not available (evenings, weekends or holidays), or when youre out of town and need minor care. Electronic visits cost only $49 and if the [employer] 24/7 provider determines a prescription is needed to treat your condition, one can be electronically transmitted to a nearby pharmacy*. Please take a moment to enroll today if you have not already done so. The enrollment process is free and takes just a few minutes. To enroll, please download the [employer] 24/7 jono to your tablet or phone, or visit [email or web address] to enroll on your computer. And, as an [address] [place] [address] patient with a [employer] account, the results of your visits will be scanned into your electronic medical record and your primary care provider will be able to view the scanned results. We urge you to continue to see your regular [employer] provider for your ongoing medical care.   And while your primary care provider may not be the one available when you seek a Jed Butterfield virtual visit, the peace of mind you get from getting a real diagnosis real time can be priceless. For more information on Jed Johnangelique, view our Frequently Asked Questions (FAQs) at [email or web address]. Sincerely, 
 
Bev Brady MD 
Chief Medical Officer [address] *:  certain medications cannot be prescribed via Jed Butterfield Unresulted Labs-Please follow up with your PCP about these lab tests Order Current Status CULTURE, BLOOD Preliminary result CULTURE, BLOOD Preliminary result Providers Seen During Your Hospitalization Provider Specialty Primary office phone Jackson Arevalo MD Emergency Medicine [phone] [address] Orthopedic Surgery [phone] Zaria Jackson MD Orthopedic Surgery [phone] Your Primary Care Physician (PCP) Primary Care Physician Office Phone Office Fax Milly Mendoza [phone] You are allergic to the following No active allergies Recent Documentation Height Weight BMI Smoking Status 1.803 m 89.8 kg 27.62 kg/m2 Current Some Day Smoker Emergency Contacts Name Discharge Info Relation Home Work Mobile Jacqui Rizo DISCHARGE CAREGIVER [3] Girlfriend [18]   [phone] Patient Belongings The following personal items are in your possession at time of discharge: 
  Dental Appliances: None  Visual Aid: None      Home Medications: None   Jewelry: None  Clothing: None    Other Valuables: None Please provide this summary of care documentation to your next provider. Signatures-by signing, you are acknowledging that this After Visit Summary has been reviewed with you and you have received a copy. Patient Signature:  ____________________________________________________________ Date:  ____________________________________________________________  
  
Martha Storey  Provider Signature: ____________________________________________________________ Date:  ____________________________________________________________

## 2018-07-25 NOTE — IP AVS SNAPSHOT
[address] [address] 
[phone] Patient: Darylene Dike MRN: [id number] SUP:[id number]/9101 A check briana indicates which time of day the medication should be taken. My Medications START taking these medications Instructions Each Dose to Equal  
 Morning Noon Evening Bedtime  
 clindamycin 150 mg capsule Commonly known as:  CLEOCIN Your last dose was: Your next dose is: Take 1 Cap by mouth every six (6) hours for 10 days. Indications: Skin and Skin Structure Infection 150 mg CHANGE how you take these medications Instructions Each Dose to Equal  
 Morning Noon Evening Bedtime  
 acetaminophen 325 mg tablet Commonly known as:  TYLENOL What changed:   
- how much to take - when to take this 
- reasons to take this - Another medication with the same name was removed. Continue taking this medication, and follow the directions you see here. Your last dose was: Your next dose is: Take 2 Tabs by mouth every six (6) hours as needed. 650 mg  
    
   
   
   
  
 oxyCODONE IR 5 mg immediate release tablet Commonly known as:  Stana Stephanie What changed:  how much to take Your last dose was: Your next dose is: Take 1 Tab by mouth every four (4) hours as needed. Max Daily Amount: 30 mg.  
 5 mg * traMADol 50 mg tablet Commonly known as:  ULTRAM  
What changed:  Another medication with the same name was added. Make sure you understand how and when to take each. Your last dose was: Your next dose is: Take 2 Tabs by mouth four (4) times daily. Max Daily Amount: 400 mg.  
 100 mg  
    
   
   
   
  
 * traMADol 50 mg tablet Commonly known as:  ULTRAM  
What changed:   You were already taking a medication with the same name, and this prescription was added. Make sure you understand how and when to take each. Your last dose was: Your next dose is: Take 2 Tabs by mouth every six (6) hours. Max Daily Amount: 400 mg.  
 100 mg * Notice: This list has 2 medication(s) that are the same as other medications prescribed for you. Read the directions carefully, and ask your doctor or other care provider to review them with you. CONTINUE taking these medications Instructions Each Dose to Equal  
 Morning Noon Evening Bedtime  
 atorvastatin 20 mg tablet Commonly known as:  LIPITOR Your last dose was: Your next dose is: Take 20 mg by mouth nightly. 20 mg  
    
   
   
   
  
 COLACE 100 mg capsule Generic drug:  docusate sodium Your last dose was: Your next dose is: Take 100 mg by mouth four (4) times daily as needed for Constipation. 100 mg FIBER-TABS 625 mg tablet Generic drug:  calcium polycarbophil Your last dose was: Your next dose is: Take 625 mg by mouth daily. 625 mg  
    
   
   
   
  
 KlonoPIN 0.5 mg tablet Generic drug:  clonazePAM  
   
Your last dose was: Your next dose is: Take 0.5 mg by mouth two (2) times daily as needed. 0.5 mg  
    
   
   
   
  
 lisinopril 5 mg tablet Commonly known as:  Hamida [place] Your last dose was: Your next dose is: Take 5 mg by mouth daily. 5 mg  
    
   
   
   
  
 nitroglycerin 0.4 mg SL tablet Commonly known as:  NITROSTAT Your last dose was: Your next dose is: 0.4 mg by SubLINGual route every five (5) minutes as needed for Chest Pain. 0.4 mg  
    
   
   
   
  
 PLAVIX 75 mg Tab Generic drug:  clopidogrel Your last dose was: Your next dose is: Take 75 mg by mouth.   
 75 mg  
    
   
   
   
 QUEtiapine 300 mg tablet Commonly known as:  SEROquel Your last dose was: Your next dose is: Take 300 mg by mouth two (2) times a day. Indications: DEPRESSION TREATMENT ADJUNCT  
 300 mg  
    
   
   
   
  
 raNITIdine 300 mg tablet Commonly known as:  ZANTAC Your last dose was: Your next dose is: Take 300 mg by mouth daily. 300 mg ZOLOFT 100 mg tablet Generic drug:  sertraline Your last dose was: Your next dose is: Take 100 mg by mouth two (2) times a day. 100 mg Where to Get Your Medications Information on where to get these meds will be given to you by the nurse or doctor. ! Ask your nurse or doctor about these medications  
  acetaminophen 325 mg tablet  
 clindamycin 150 mg capsule  
 oxyCODONE IR 5 mg immediate release tablet  
 traMADol 50 mg tablet

## 2018-07-26 PROBLEM — L08.9 WOUND INFECTION: Status: ACTIVE | Noted: 2018-07-26

## 2018-07-26 PROBLEM — T14.8XXA WOUND INFECTION: Status: ACTIVE | Noted: 2018-07-26

## 2018-07-26 LAB
AMPHET UR QL SCN: NEGATIVE
BARBITURATES UR QL SCN: NEGATIVE
BENZODIAZ UR QL: NEGATIVE
CANNABINOIDS UR QL SCN: NEGATIVE
COCAINE UR QL SCN: NEGATIVE
CRP SERPL-MCNC: 2.1 MG/DL (ref 0–0.3)
EST. AVERAGE GLUCOSE BLD GHB EST-MCNC: 140 MG/DL
GLUCOSE BLD STRIP.AUTO-MCNC: 100 MG/DL (ref 70–110)
GLUCOSE BLD STRIP.AUTO-MCNC: 116 MG/DL (ref 70–110)
GLUCOSE BLD STRIP.AUTO-MCNC: 154 MG/DL (ref 70–110)
GLUCOSE BLD STRIP.AUTO-MCNC: 176 MG/DL (ref 70–110)
HBA1C MFR BLD: 6.5 % (ref 4.5–5.6)
HDSCOM,HDSCOM: ABNORMAL
METHADONE UR QL: NEGATIVE
OPIATES UR QL: POSITIVE
PCP UR QL: NEGATIVE

## 2018-07-26 PROCEDURE — 74011250636 HC RX REV CODE- 250/636: Performed by: EMERGENCY MEDICINE

## 2018-07-26 PROCEDURE — 82962 GLUCOSE BLOOD TEST: CPT

## 2018-07-26 PROCEDURE — 74011250636 HC RX REV CODE- 250/636: Performed by: PHYSICIAN ASSISTANT

## 2018-07-26 PROCEDURE — 74011250636 HC RX REV CODE- 250/636: Performed by: INTERNAL MEDICINE

## 2018-07-26 PROCEDURE — 96376 TX/PRO/DX INJ SAME DRUG ADON: CPT

## 2018-07-26 PROCEDURE — 74011250637 HC RX REV CODE- 250/637: Performed by: ORTHOPAEDIC SURGERY

## 2018-07-26 PROCEDURE — 65270000029 HC RM PRIVATE

## 2018-07-26 PROCEDURE — 80307 DRUG TEST PRSMV CHEM ANLYZR: CPT | Performed by: ORTHOPAEDIC SURGERY

## 2018-07-26 PROCEDURE — 74011250636 HC RX REV CODE- 250/636: Performed by: ORTHOPAEDIC SURGERY

## 2018-07-26 PROCEDURE — 96367 TX/PROPH/DG ADDL SEQ IV INF: CPT

## 2018-07-26 PROCEDURE — 74011636637 HC RX REV CODE- 636/637: Performed by: INTERNAL MEDICINE

## 2018-07-26 PROCEDURE — 74011000258 HC RX REV CODE- 258: Performed by: INTERNAL MEDICINE

## 2018-07-26 RX ORDER — CLOPIDOGREL BISULFATE 75 MG/1
75 TABLET ORAL DAILY
Status: DISCONTINUED | OUTPATIENT
Start: 2018-07-26 | End: 2018-07-26

## 2018-07-26 RX ORDER — DEXTROSE MONOHYDRATE AND SODIUM CHLORIDE 5; .9 G/100ML; G/100ML
75 INJECTION, SOLUTION INTRAVENOUS CONTINUOUS
Status: DISCONTINUED | OUTPATIENT
Start: 2018-07-26 | End: 2018-07-30

## 2018-07-26 RX ORDER — DEXTROSE 50 % IN WATER (D50W) INTRAVENOUS SYRINGE
25-50 AS NEEDED
Status: DISCONTINUED | OUTPATIENT
Start: 2018-07-26 | End: 2018-07-30 | Stop reason: HOSPADM

## 2018-07-26 RX ORDER — INSULIN LISPRO 100 [IU]/ML
INJECTION, SOLUTION INTRAVENOUS; SUBCUTANEOUS EVERY 6 HOURS
Status: DISCONTINUED | OUTPATIENT
Start: 2018-07-26 | End: 2018-07-30 | Stop reason: HOSPADM

## 2018-07-26 RX ORDER — CLONAZEPAM 0.5 MG/1
0.5 TABLET ORAL
Status: DISCONTINUED | OUTPATIENT
Start: 2018-07-26 | End: 2018-07-30 | Stop reason: HOSPADM

## 2018-07-26 RX ORDER — DOCUSATE SODIUM 100 MG/1
100 CAPSULE, LIQUID FILLED ORAL DAILY
Status: DISCONTINUED | OUTPATIENT
Start: 2018-07-26 | End: 2018-07-30 | Stop reason: HOSPADM

## 2018-07-26 RX ORDER — RANITIDINE 150 MG/1
300 TABLET, FILM COATED ORAL 2 TIMES DAILY
Status: DISCONTINUED | OUTPATIENT
Start: 2018-07-26 | End: 2018-07-30 | Stop reason: HOSPADM

## 2018-07-26 RX ORDER — VANCOMYCIN HYDROCHLORIDE 1 G/20ML
1 INJECTION, POWDER, LYOPHILIZED, FOR SOLUTION INTRAVENOUS ONCE
Status: COMPLETED | OUTPATIENT
Start: 2018-07-26 | End: 2018-07-26

## 2018-07-26 RX ORDER — HYDROMORPHONE HYDROCHLORIDE 2 MG/ML
1 INJECTION, SOLUTION INTRAMUSCULAR; INTRAVENOUS; SUBCUTANEOUS ONCE
Status: COMPLETED | OUTPATIENT
Start: 2018-07-26 | End: 2018-07-26

## 2018-07-26 RX ORDER — ATORVASTATIN CALCIUM 20 MG/1
20 TABLET, FILM COATED ORAL
Status: DISCONTINUED | OUTPATIENT
Start: 2018-07-26 | End: 2018-07-30 | Stop reason: HOSPADM

## 2018-07-26 RX ORDER — QUETIAPINE FUMARATE 100 MG/1
300 TABLET, FILM COATED ORAL 2 TIMES DAILY
Status: DISCONTINUED | OUTPATIENT
Start: 2018-07-26 | End: 2018-07-30 | Stop reason: HOSPADM

## 2018-07-26 RX ORDER — HYDROMORPHONE HYDROCHLORIDE 1 MG/ML
1 INJECTION, SOLUTION INTRAMUSCULAR; INTRAVENOUS; SUBCUTANEOUS ONCE
Status: DISCONTINUED | OUTPATIENT
Start: 2018-07-26 | End: 2018-07-26

## 2018-07-26 RX ORDER — MORPHINE SULFATE 4 MG/ML
4 INJECTION INTRAVENOUS
Status: COMPLETED | OUTPATIENT
Start: 2018-07-26 | End: 2018-07-26

## 2018-07-26 RX ORDER — HYDROMORPHONE HYDROCHLORIDE 2 MG/ML
2 INJECTION, SOLUTION INTRAMUSCULAR; INTRAVENOUS; SUBCUTANEOUS
Status: DISCONTINUED | OUTPATIENT
Start: 2018-07-26 | End: 2018-07-30 | Stop reason: HOSPADM

## 2018-07-26 RX ORDER — CALCIUM POLYCARBOPHIL 625 MG
1 TABLET ORAL DAILY
Status: DISCONTINUED | OUTPATIENT
Start: 2018-07-26 | End: 2018-07-30 | Stop reason: HOSPADM

## 2018-07-26 RX ORDER — ACETAMINOPHEN 325 MG/1
650 TABLET ORAL EVERY 6 HOURS
Status: DISCONTINUED | OUTPATIENT
Start: 2018-07-26 | End: 2018-07-30

## 2018-07-26 RX ORDER — SODIUM CHLORIDE 9 MG/ML
100 INJECTION, SOLUTION INTRAVENOUS CONTINUOUS
Status: DISCONTINUED | OUTPATIENT
Start: 2018-07-26 | End: 2018-07-30

## 2018-07-26 RX ORDER — OXYCODONE HYDROCHLORIDE 5 MG/1
5-10 TABLET ORAL
Status: DISCONTINUED | OUTPATIENT
Start: 2018-07-26 | End: 2018-07-29

## 2018-07-26 RX ORDER — MAGNESIUM SULFATE 100 %
4 CRYSTALS MISCELLANEOUS AS NEEDED
Status: DISCONTINUED | OUTPATIENT
Start: 2018-07-26 | End: 2018-07-30 | Stop reason: HOSPADM

## 2018-07-26 RX ORDER — TRAMADOL HYDROCHLORIDE 50 MG/1
100 TABLET ORAL EVERY 6 HOURS
Status: DISCONTINUED | OUTPATIENT
Start: 2018-07-26 | End: 2018-07-30 | Stop reason: HOSPADM

## 2018-07-26 RX ORDER — LISINOPRIL 5 MG/1
5 TABLET ORAL DAILY
Status: DISCONTINUED | OUTPATIENT
Start: 2018-07-26 | End: 2018-07-26

## 2018-07-26 RX ORDER — NITROGLYCERIN 0.4 MG/1
0.4 TABLET SUBLINGUAL AS NEEDED
Status: DISCONTINUED | OUTPATIENT
Start: 2018-07-26 | End: 2018-07-30 | Stop reason: HOSPADM

## 2018-07-26 RX ADMIN — RANITIDINE 300 MG: 150 TABLET ORAL at 20:46

## 2018-07-26 RX ADMIN — RANITIDINE 300 MG: 150 TABLET ORAL at 12:06

## 2018-07-26 RX ADMIN — ATORVASTATIN CALCIUM 20 MG: 20 TABLET, FILM COATED ORAL at 23:28

## 2018-07-26 RX ADMIN — CALCIUM POLYCARBOPHIL 625 MG: 625 TABLET, FILM COATED ORAL at 12:06

## 2018-07-26 RX ADMIN — SODIUM CHLORIDE 1000 ML: 900 INJECTION, SOLUTION INTRAVENOUS at 17:50

## 2018-07-26 RX ADMIN — HYDROMORPHONE HYDROCHLORIDE 1 MG: 2 INJECTION, SOLUTION INTRAMUSCULAR; INTRAVENOUS; SUBCUTANEOUS at 03:52

## 2018-07-26 RX ADMIN — ACETAMINOPHEN 650 MG: 325 TABLET, FILM COATED ORAL at 12:06

## 2018-07-26 RX ADMIN — INSULIN LISPRO 2 UNITS: 100 INJECTION, SOLUTION INTRAVENOUS; SUBCUTANEOUS at 17:58

## 2018-07-26 RX ADMIN — TRAMADOL HYDROCHLORIDE 100 MG: 50 TABLET, FILM COATED ORAL at 12:06

## 2018-07-26 RX ADMIN — SODIUM CHLORIDE 500 ML: 900 INJECTION, SOLUTION INTRAVENOUS at 19:31

## 2018-07-26 RX ADMIN — ACETAMINOPHEN 650 MG: 325 TABLET, FILM COATED ORAL at 17:50

## 2018-07-26 RX ADMIN — PIPERACILLIN SODIUM,TAZOBACTAM SODIUM 3.38 G: 3; .375 INJECTION, POWDER, FOR SOLUTION INTRAVENOUS at 07:18

## 2018-07-26 RX ADMIN — MORPHINE SULFATE 4 MG: 4 INJECTION INTRAVENOUS at 00:21

## 2018-07-26 RX ADMIN — HYDROMORPHONE HYDROCHLORIDE 2 MG: 2 INJECTION, SOLUTION INTRAMUSCULAR; INTRAVENOUS; SUBCUTANEOUS at 09:58

## 2018-07-26 RX ADMIN — QUETIAPINE FUMARATE 300 MG: 100 TABLET ORAL at 12:06

## 2018-07-26 RX ADMIN — ACETAMINOPHEN 650 MG: 325 TABLET, FILM COATED ORAL at 23:28

## 2018-07-26 RX ADMIN — DOCUSATE SODIUM 100 MG: 100 CAPSULE, LIQUID FILLED ORAL at 12:06

## 2018-07-26 RX ADMIN — QUETIAPINE FUMARATE 300 MG: 100 TABLET ORAL at 20:46

## 2018-07-26 RX ADMIN — CLOPIDOGREL BISULFATE 75 MG: 75 TABLET ORAL at 12:06

## 2018-07-26 RX ADMIN — SODIUM CHLORIDE 1000 MG: 900 INJECTION, SOLUTION INTRAVENOUS at 00:21

## 2018-07-26 RX ADMIN — SODIUM CHLORIDE 1000 MG: 900 INJECTION, SOLUTION INTRAVENOUS at 14:11

## 2018-07-26 RX ADMIN — LISINOPRIL 5 MG: 5 TABLET ORAL at 10:41

## 2018-07-26 RX ADMIN — VANCOMYCIN HYDROCHLORIDE 1000 MG: 1 INJECTION, POWDER, LYOPHILIZED, FOR SOLUTION INTRAVENOUS at 04:02

## 2018-07-26 RX ADMIN — DEXTROSE MONOHYDRATE AND SODIUM CHLORIDE 75 ML/HR: 5; .9 INJECTION, SOLUTION INTRAVENOUS at 04:01

## 2018-07-26 RX ADMIN — TRAMADOL HYDROCHLORIDE 100 MG: 50 TABLET, FILM COATED ORAL at 17:50

## 2018-07-26 RX ADMIN — OXYCODONE HYDROCHLORIDE 10 MG: 5 TABLET ORAL at 14:14

## 2018-07-26 RX ADMIN — PIPERACILLIN SODIUM,TAZOBACTAM SODIUM 3.38 G: 3; .375 INJECTION, POWDER, FOR SOLUTION INTRAVENOUS at 17:05

## 2018-07-26 RX ADMIN — PIPERACILLIN SODIUM,TAZOBACTAM SODIUM 3.38 G: 3; .375 INJECTION, POWDER, FOR SOLUTION INTRAVENOUS at 23:27

## 2018-07-26 NOTE — CALL BACK NOTE
Nurse called stating patient's systolic BP is still in 94Y after 1L bolus from earlier. Earlier in the day it was in systolic 245V. For now I will give him another 500cc NS bolus then start on 100cc/Hr. Stop Lisinopril. Plans for OR tomorrow. No signs of septic shock at this time.

## 2018-07-26 NOTE — ED TRIAGE NOTES
Pt arrives to ED with c\o surgical site draining serosanguinous malodorous drainage, pt is able to make needs known speaking in complete sentences, pt in nad at this time

## 2018-07-26 NOTE — ED NOTES
Right knee wrapped with sterile dressing and ace wrap by provider. Patient requesting pain medication at this time.

## 2018-07-26 NOTE — ROUTINE PROCESS
TRANSFER - IN REPORT:    Verbal report received from Ronak Montilla RN(name) on Ron Horner  being received from ER(unit) for routine progression of care      Report consisted of patients Situation, Background, Assessment and   Recommendations(SBAR). Information from the following report(s) SBAR, Kardex, Intake/Output, MAR and Recent Results was reviewed with the receiving nurse. Opportunity for questions and clarification was provided. Assessment completed upon patients arrival to unit and care assumed.

## 2018-07-26 NOTE — H&P
History and Physical        Patient: Eugenio No               Sex: male          DOA: 7/25/2018         YOB: 1960      Age:  63 y.o.        LOS:  LOS: 0 days        HPI:     Eugenio No is a 63 y.o. male who presents with pain, wound dehisence via ER last night. S/P TKR complicated by blisters was seen in office 7/11 doing well. Never seen by PT til yesterday. No calls to office. When wound hehisced went to ER. Admitted in [address] [place] [address]. CBC normal white count. Past Medical History:   Diagnosis Date    Acid reflux     Arthritis     CAD (coronary artery disease)     MI x 2 with stents    GERD (gastroesophageal reflux disease)     High cholesterol     Hypertension 2010       Past Surgical History:   Procedure Laterality Date    HX CORONARY STENT PLACEMENT      HX HEENT      teeth removal    HX ORTHOPAEDIC Right     knee open procedure after accident   [address] [facility]      lower back       History reviewed. No pertinent family history. Social History     Social History    Marital status:      Spouse name: N/A    Number of children: N/A    Years of education: N/A     Social History Main Topics    Smoking status: Current Some Day Smoker     Packs/day: 0.25     Years: 40.00    Smokeless tobacco: Never Used      Comment: advised to hold all tobacco 24 hours prior     Alcohol use Yes      Comment: 2 x month    Drug use: No    Sexual activity: Not Asked     Other Topics Concern    None     Social History Narrative       Prior to Admission medications    Medication Sig Start Date End Date Taking? Authorizing Provider   sertraline (ZOLOFT) 100 mg tablet Take 100 mg by mouth two (2) times a day. Historical Provider   docusate sodium (COLACE) 100 mg capsule Take 100 mg by mouth four (4) times daily as needed for Constipation. Historical Provider   QUEtiapine (SEROQUEL) 300 mg tablet Take 300 mg by mouth two (2) times a day.  Indications: DEPRESSION TREATMENT ADJUNCT    Historical Provider   acetaminophen (TYLENOL) 325 mg tablet Take 2 Tabs by mouth every four (4) hours as needed. 6/27/18   Lise Litten, MD   oxyCODONE IR (ROXICODONE) 5 mg immediate release tablet Take 2 Tabs by mouth every four (4) hours as needed. Max Daily Amount: 60 mg. 6/27/18   Lise Litten, MD   traMADol Bella Haring) 50 mg tablet Take 2 Tabs by mouth four (4) times daily. Max Daily Amount: 400 mg. 6/27/18   Lise Litten, MD   clonazePAM (KLONOPIN) 0.5 mg tablet Take 0.5 mg by mouth two (2) times daily as needed. Historical Provider   calcium polycarbophil (FIBER-TABS) 625 mg tablet Take 625 mg by mouth daily. Historical Provider   acetaminophen (TYLENOL) 325 mg tablet Take  by mouth every four (4) hours as needed for Pain. Historical Provider   clopidogrel (PLAVIX) 75 mg tab Take 75 mg by mouth. Laverne Up MD   atorvastatin (LIPITOR) 20 mg tablet Take 20 mg by mouth nightly. Laverne Up MD   lisinopril (PRINIVIL, ZESTRIL) 5 mg tablet Take 5 mg by mouth daily. Laverne Up MD   nitroglycerin (NITROSTAT) 0.4 mg SL tablet 0.4 mg by SubLINGual route every five (5) minutes as needed for Chest Pain. Laverne Up MD   raNITIdine (ZANTAC) 300 mg tablet Take 300 mg by mouth daily. Laverne Up MD       No Known Allergies    Review of Systems  Pertinent items are noted in the History of Present Illness. Physical Exam:      Visit Vitals    /89    Pulse 69    Temp 98.6 °F (37 °C)    Resp 18    Ht 5' 11\" (1.803 m)    Wt 89.8 kg (198 lb)    SpO2 97%    BMI 27.62 kg/m2       Physical Exam:  Physical Exam:   General:  Alert, cooperative, no distress, appears stated age. Eyes:  Conjunctivae/corneas clear. PERRL, EOMs intact. Fundi benign   Ears:  Normal TMs and external ear canals both ears. Nose: Nares normal. Septum midline. Mucosa normal. No drainage or sinus tenderness.    Mouth/Throat: Lips, mucosa, and tongue normal. Teeth and gums normal.   Neck: Supple, symmetrical, trachea midline, no adenopathy, thyroid: no enlargement/tenderness/nodules, no carotid bruit and no JVD. Back:   Symmetric, no curvature. ROM normal. No CVA tenderness. Lungs:   Clear to auscultation bilaterally. Heart:  Regular rate and rhythm, S1, S2 normal, no murmur, click, rub or gallop. Abdomen:   Soft, non-tender. Bowel sounds normal. No masses,  No organomegaly. Extremities: Extremities normal, atraumatic, no cyanosis or edema. Right knee distal dehiscence approx 4 cm next to necrotic ismael 4x2 cm. Distal edema. Pulses + with doppler   Pulses: 2+ and symmetric all extremities. Skin: Skin color, texture, turgor normal. No rashes or lesions   Lymph nodes: Cervical, supraclavicular, and axillary nodes normal.   Neurologic: CNII-XII intact. Normal strength, sensation and reflexes throughout. Labs Reviewed: All lab results for the last 24 hours reviewed. Assessment/Plan     Active Problems:    Wound infection (7/26/2018)    Wound dehiscence with early cellulitis.  Doubt deep infection  Right knee debride; application wound vac fri

## 2018-07-26 NOTE — PROGRESS NOTES
46 - Received pt from the ER. Pt had no complaints of chest pain or SOB. Pt denies numbness in the left leg but states that the right leg is heavy. Patient oriented to room and left on room air. [address] from pharmacy stated pt is to receive another dose of vancomycin to equal 2g of vancomycin. 65 - Pharmacy called for normal saline 250 ml to add to vancomycin. Edmund Oliveira 83 paged at this time in reference to pt needing pain medication. Will wait for return call. Shift summary - Patient left with no signs of distress.

## 2018-07-26 NOTE — WOUND CARE
Wound Care Progress Note New consult placed by Darius Grewal RN for \"right knee dehiscence\" Assessment:  
Communication: A&O x 4 communicative Continent Partial assists in repositioning. Diet: Diabetic Pre-medicated for pain by RN. 1. POA Right knee dehiscence (wound location & etiology) = with depth, about 4 cm, approximate measurement deferred due to presence of necrotic tissue. Moderate serosanguinous exudate. Periwound intact & without erythema. Margins are not approximated. Treatment: Debridement with peroxide, kayden in depth of wound, followed by abd pads, kerlix and ACE wrap, pt for surgery and wound vac placement tomorrow. Wound Recommendations:   
Per surgeon Skin Care & Pressure Relief Recommendations: 
Minimize layers of linen/pads under patient to optimize support surface. Turn/reposition approximately every 2 hours and offload heels pillows or Prevalon boots. Manage incontinence / promote continence; Proshield to buttocks and sacrum daily and as needed with incontinence care Discussed above plan with patient & Demond Amezcua MD 
 
Transition of Care: Plan to follow weekly and per product recommendations while admitted to hospital.

## 2018-07-26 NOTE — PROGRESS NOTES
Patient seen this afternoon for follow up on morning consult. Continue with empiric abx coverage with Zosyn and Vancomycin. Prelim gram stain showing gram pos cocci. Will await further culture data and engage ID if necessary.

## 2018-07-26 NOTE — PROGRESS NOTES
Pharmacy Dosing Services: Vancomycin    Consult for Vancomycin Dosing by Pharmacy by Dr. Samuel Awan provided for this 63y.o. year old male , for indication of bone and joint infection. Day of Therapy 1    Ht Readings from Last 1 Encounters:   07/25/18 180.3 cm (71\")        Wt Readings from Last 1 Encounters:   07/25/18 89.8 kg (198 lb)        Other Current Antibiotics Zosyn 3.375gm IV q 6 hr   Significant Cultures Pending from micro lab   Serum Creatinine Lab Results   Component Value Date/Time    Creatinine 1.28 07/25/2018 09:50 PM      Creatinine Clearance Estimated Creatinine Clearance: 67.8 mL/min (based on Cr of 1.28). BUN Lab Results   Component Value Date/Time    BUN 15 07/25/2018 09:50 PM      WBC Lab Results   Component Value Date/Time    WBC 7.6 07/25/2018 09:50 PM      H/H Lab Results   Component Value Date/Time    HGB 11.1 (L) 07/25/2018 09:50 PM      Platelets Lab Results   Component Value Date/Time    PLATELET 542 49/83/6200 09:50 PM      Temp 97.7 °F (36.5 °C)     Start Vancomycin therapy, with loading dose of 2000 (mg) at  (020 and 0200)07/26/2018(time/date). Follow with maintenance dose of 1000(mg) at 1400/07/26/2018 (time/date), every 12 hours (frequency). Dose calculated to approximate a therapeutic trough of 15-20mcg/mL. Pharmacy to follow daily and will make changes to dose and/or frequency based on clinical status. Estimated Pharmacokinetic Parameters (based on population kinetics)  Vd: 63 L (0.7 L/kg)   Oliver: 0.058 hr-1 (T1/2 = 11.9 hrs)     Dosing Recommendations   Vancomycin dose: 1000 mg IV Q12hrs (infused over 1 hr)   Estimated peak: 30.8 mcg/mL   Estimated trough: 16.3 mcg/mL   Estimated AUC:NOHELIA: 548 mcg*hr/mL (assumed NOHELIA 1 mcg/mL)     A/P:   1. Recommend vancomycin 1000 mg IV Q12hrs (11 mg/kg)   2. Consider a vancomycin trough level prior to the 4th dose. 3. Please monitor renal function (urine output, BUN/SCr).  Dose adjustments may be necessary with a significant change in renal function. 4. Vancomycin loading dose of 2000 mg  1GM + 1 gm )to facilitate rapid attainment of target trough serum vancomycin levels.     Pharmacist Narciso Corrales, PHARMD

## 2018-07-26 NOTE — PROGRESS NOTES
Reason for Admission:   Wound infection                  RRAT Score:     13             Do you (patient/family) have any concerns for transition/discharge? Plan for utilizing home health:         Likelihood of readmission? Yellow            Transition of Care Plan:      Chart reviewed, pt admitted with right knee infection. Plan for wound vac placement after debridement Friday. CM did not see today d/t family anxious re- hospitalization. CM will attempt to see tomorrow prior to going to OR. Care Management Interventions  PCP Verified by CM:  Yes  Transition of Care Consult (CM Consult): Discharge Planning

## 2018-07-26 NOTE — CONSULTS
Medicine Consult    Patient:  Kia Quintero 63 y.o. male  Asked to evaluate patient by ED physician/Ortho   Primary Care Provider:  Kate Haney MD  Date of Admission:  7/25/2018  Reason for Consult: Infected wound         Assessment/Plan     Patient Active Problem List   Diagnosis Code   (none) - all problems resolved or deleted     1. Right Knee Infection/Necrotic wound/Wound dehiscence   2. HTN  3. CAD s/p Stent   4. HLD  FULL CODE     -medical team consulted for medical management   -cultures drawn, Will order Vanc and zosyn. May need ID consult in am to determine the length and choice of Abx tx.   -will likely need surgical repair per ortho.   -accuchecks q6hrs while NPO, gentle hydration   -pain control  -supportive care   -cont home meds   - should also help assist him to help set him up for home PT and wound RN at the time of his discharge. Thank you for allowing us to participate in this patients care. HPI:   CC:  Kia Quintero is a 63 y.o. male with past medical history significant as listed came to the hospital for wound infection. Patient had undergone Right total knee replacement on 6/25/18 and since then he has had an issue with possible swelling, infections and wound dehisence. He was readmitted on 7/1 for knee pain and swelling; anemia and skin bullae, he received couple of more days of Abx. After going home he continued to having pain and swelling of the wound. Over the course of past 3 weeks he stared noticing more drainage and necrosis of that area. According to him he has not been getting any PT or wound care at home. In the ED he was noted to have necrotic area of his wound which was covered with temporary dressing. Ortho admitted the patient and medical team consulted for medical condition management. No fevers, chills and other complaints during this time.       Past Medical History:   Diagnosis Date    Acid reflux     Arthritis     CAD (coronary artery disease)     MI x 2 with stents    GERD (gastroesophageal reflux disease)     High cholesterol     Hypertension 2010     Past Surgical History:   Procedure Laterality Date    HX CORONARY STENT PLACEMENT      HX HEENT      teeth removal    HX ORTHOPAEDIC Right     knee open procedure after accident    NEUROLOGICAL PROCEDURE UNLISTED      lower back      Social History   Substance Use Topics    Smoking status: Current Some Day Smoker     Packs/day: 0.25     Years: 40.00    Smokeless tobacco: Never Used      Comment: advised to hold all tobacco 24 hours prior     Alcohol use Yes      Comment: 2 x month     History reviewed. No pertinent family history. No current facility-administered medications on file prior to encounter. Current Outpatient Prescriptions on File Prior to Encounter   Medication Sig Dispense Refill    sertraline (ZOLOFT) 100 mg tablet Take 100 mg by mouth two (2) times a day.  docusate sodium (COLACE) 100 mg capsule Take 100 mg by mouth four (4) times daily as needed for Constipation.  QUEtiapine (SEROQUEL) 300 mg tablet Take 300 mg by mouth two (2) times a day. Indications: DEPRESSION TREATMENT ADJUNCT      acetaminophen (TYLENOL) 325 mg tablet Take 2 Tabs by mouth every four (4) hours as needed. 120 Tab 0    oxyCODONE IR (ROXICODONE) 5 mg immediate release tablet Take 2 Tabs by mouth every four (4) hours as needed. Max Daily Amount: 60 mg. 60 Tab 0    traMADol (ULTRAM) 50 mg tablet Take 2 Tabs by mouth four (4) times daily. Max Daily Amount: 400 mg. 60 Tab 0    clonazePAM (KLONOPIN) 0.5 mg tablet Take 0.5 mg by mouth two (2) times daily as needed.  calcium polycarbophil (FIBER-TABS) 625 mg tablet Take 625 mg by mouth daily.  acetaminophen (TYLENOL) 325 mg tablet Take  by mouth every four (4) hours as needed for Pain.  clopidogrel (PLAVIX) 75 mg tab Take 75 mg by mouth.  atorvastatin (LIPITOR) 20 mg tablet Take 20 mg by mouth nightly.       lisinopril (PRINIVIL, ZESTRIL) 5 mg tablet Take 5 mg by mouth daily.  nitroglycerin (NITROSTAT) 0.4 mg SL tablet 0.4 mg by SubLINGual route every five (5) minutes as needed for Chest Pain.  raNITIdine (ZANTAC) 300 mg tablet Take 300 mg by mouth daily. No Known Allergies        Review of Systems  Constitutional: No fever, chills, diaphoresis, malaise, fatigue or weight gain/loss or falls  Skin: no itching or rashes  HEENT: no ear discomfort, hearing loss, tinnitus, epistaxis or sore throat  EYES: no blurry vision, double vision or photophobia  CARDIOVASCULAR: no claudication, cp, palpitations, orthopnea, pnd or LE edema  PULMONARY: no cough, wheeze, shortness of breath or sputum production  GI: no nausea, vomiting, diarrhea, abdominal pain, melena, hematemesis or brbpr  : no dysuria, hematuria  MUSCULOSKELETAL: per hpi   ENDOCRINE: no heat/cold intolerance, polyuria or polydipsia  HEME: no easy bruising or bleeding  NEURO: no unilateral weakness, numbness, tingling or seizures      Physical Exam:      Visit Vitals    BP (!) 116/96    Pulse 84    Temp 97.7 °F (36.5 °C)    Resp 18    Ht 5' 11\" (1.803 m)    Wt 89.8 kg (198 lb)    SpO2 100%    BMI 27.62 kg/m2     Body mass index is 27.62 kg/(m^2). Physical Exam:  GEN: well nourished, laying in bed in no acute distress  HEENT: atraumatic, nose normal,oropharynx clear, MMM  NECK: supple, trachea midline, no supraclavicular or submandibular adenopathy noted  EYES: conjuctiva normal, lids with out lesions, PERRL  HEART: RRR with out m/r/g, pmi nondisplaced, pulses 2+ distally  LUNGS: equal chest wall expansion, cta bl with out wheezes/rales or rhonchi  AB: soft, +BS, nt/nd no organomegaly  NEURO: alert, awake and oriented x3, gait not assessed, cranial nerves intact, strength 5/5 bl UE and LE, sensation intact, reflexes nonpathological  RIght knee post op changes with large wound dehiscence with necrotic foul smelling. Tender and warm to touch. Laboratory Studies:    Recent Results (from the past 24 hour(s))   CBC WITH AUTOMATED DIFF    Collection Time: 07/25/18  9:50 PM   Result Value Ref Range    WBC 7.6 4.6 - 13.2 K/uL    RBC 4.59 (L) 4.70 - 5.50 M/uL    HGB 11.1 (L) 13.0 - 16.0 g/dL    HCT 35.3 (L) 36.0 - 48.0 %    MCV 76.9 74.0 - 97.0 FL    MCH 24.2 24.0 - 34.0 PG    MCHC 31.4 31.0 - 37.0 g/dL    RDW 18.3 (H) 11.6 - 14.5 %    PLATELET 899 357 - 980 K/uL    MPV 9.8 9.2 - 11.8 FL    NEUTROPHILS 56 40 - 73 %    LYMPHOCYTES 35 21 - 52 %    MONOCYTES 6 3 - 10 %    EOSINOPHILS 3 0 - 5 %    BASOPHILS 0 0 - 2 %    ABS. NEUTROPHILS 4.2 1.8 - 8.0 K/UL    ABS. LYMPHOCYTES 2.7 0.9 - 3.6 K/UL    ABS. MONOCYTES 0.5 0.05 - 1.2 K/UL    ABS. EOSINOPHILS 0.2 0.0 - 0.4 K/UL    ABS. BASOPHILS 0.0 0.0 - 0.1 K/UL    DF AUTOMATED     METABOLIC PANEL, COMPREHENSIVE    Collection Time: 07/25/18  9:50 PM   Result Value Ref Range    Sodium 138 136 - 145 mmol/L    Potassium 3.8 3.5 - 5.5 mmol/L    Chloride 100 100 - 108 mmol/L    CO2 29 21 - 32 mmol/L    Anion gap 9 3.0 - 18 mmol/L    Glucose 118 (H) 74 - 99 mg/dL    BUN 15 7.0 - 18 MG/DL    Creatinine 1.28 0.6 - 1.3 MG/DL    BUN/Creatinine ratio 12 12 - 20      GFR est AA >60 >60 ml/min/1.73m2    GFR est non-AA 58 (L) >60 ml/min/1.73m2    Calcium 9.3 8.5 - 10.1 MG/DL    Bilirubin, total 0.4 0.2 - 1.0 MG/DL    ALT (SGPT) 35 16 - 61 U/L    AST (SGOT) 19 15 - 37 U/L    Alk.  phosphatase 130 (H) 45 - 117 U/L    Protein, total 7.6 6.4 - 8.2 g/dL    Albumin 3.3 (L) 3.4 - 5.0 g/dL    Globulin 4.3 (H) 2.0 - 4.0 g/dL    A-G Ratio 0.8 0.8 - 1.7     CARDIAC PANEL,(CK, CKMB & TROPONIN)    Collection Time: 07/25/18  9:50 PM   Result Value Ref Range    CK 81 39 - 308 U/L    CK - MB <1.0 <3.6 ng/ml    CK-MB Index  0.0 - 4.0 %     CALCULATION NOT PERFORMED WHEN RESULT IS BELOW LINEAR LIMIT    Troponin-I, Qt. <0.02 0.00 - 0.06 NG/ML   LACTIC ACID    Collection Time: 07/25/18  9:50 PM   Result Value Ref Range    Lactic acid 1.7 0.4 - 2.0 MMOL/L   PROTHROMBIN TIME + INR    Collection Time: 07/25/18  9:50 PM   Result Value Ref Range    Prothrombin time 13.1 11.5 - 15.2 sec    INR 1.0 0.8 - 1.2     PTT    Collection Time: 07/25/18  9:50 PM   Result Value Ref Range    aPTT 34.8 23.0 - 36.4 SEC   SED RATE (ESR)    Collection Time: 07/25/18  9:50 PM   Result Value Ref Range    Sed rate, automated 24 (H) 0 - 20 mm/hr

## 2018-07-26 NOTE — PROGRESS NOTES
[address] [place] [address] 280 office concerning pain mediation. NAI Torres unaware of admission at this time. Transferred to Dr. Esquivel. Informed Dr. Esquivel that patient was admitted through the ED for right knee infection, necrotic wound, wound dehiscence. Orders given to continue all home medications as listed in pre admission medications except for the below orders. Wound care consult  Change admitting to Dr. Richard Baker scheduled  Ultram 100mg q6H scheduled   Hospitalist consult  Dilaudid 2mg IV q6h PRN  Oxycodone 5-10 q6PRN  Urine drug screen  MD states that he will not discuss surgery at this time. Orders given to change current dressing, clean with peroxide and apply abd/ace. Then patient may be seen by wound care. Dr. Esquivel will be here at lunch to assess the patient. Primary nurse, patient, and family made aware of all orders.

## 2018-07-26 NOTE — ROUTINE PROCESS
TRANSFER - OUT REPORT:    Verbal report given to Oniel Granado RN on Yancy Simon  being transferred to [facility] (orthopedic unit) for routine progression of care       Report consisted of patients Situation, Background, Assessment and   Recommendations(SBAR). Information from the following report(s) SBAR, ED Summary and MAR was reviewed with the receiving nurse. Lines:   Peripheral IV 07/25/18 Left Antecubital (Active)   Site Assessment Clean, dry, & intact 7/25/2018 10:02 PM   Phlebitis Assessment 0 7/25/2018 10:02 PM   Infiltration Assessment 0 7/25/2018 10:02 PM   Dressing Status Clean, dry, & intact 7/25/2018 10:02 PM   Dressing Type Transparent 7/25/2018 10:02 PM   Hub Color/Line Status Pink;Patent; Flushed 7/25/2018 10:02 PM   Action Taken Blood drawn 7/25/2018 10:02 PM        Opportunity for questions and clarification was provided.       Patient transported with:   Registered Nurse        '

## 2018-07-26 NOTE — ED NOTES
Pt had knee replacement 1 mos ago. Arrives tonight with open and significantly dehissed wound site, foul odor, purulent discharge, pain.

## 2018-07-26 NOTE — ROUTINE PROCESS
Bedside and Verbal shift change report given to Alejandra Cunningham RN (oncoming nurse) CANDIDO Gil RN (offgoing nurse). Report included the following information SBAR, Kardex, Intake/Output, MAR and Recent Results.

## 2018-07-26 NOTE — ED PROVIDER NOTES
EMERGENCY DEPARTMENT HISTORY AND PHYSICAL EXAM    Date: 7/25/2018  Patient Name: Socorro Alfaro    History of Presenting Illness     Chief Complaint   Patient presents with    Knee Surgery    Post OP Complication         History Provided By: Patient    Chief Complaint: Right knee wound dehiscence with wound drainage  Duration: 9 days   Timing:  Worsening  Location: Right knee  Associated Symptoms: right knee pain    Additional History (Context):   10:05 PM  Socorro Alfaro is a 63 y.o. male with PMHx of HTN who presents to the emergency department C/O right knee wound dehiscence with wound drainage, onset 9 days ago. Associated sxs include right knee pain (rated 10/10). Pt reports that he had right knee replacement 1 month ago on 6/23/18 by Dr. Stevens Husbands. Pt had follow up on 7/10/18 but pt did not make Dr. Stevens Husbands aware of his problem. Endorses tobacco and occasional EtOH use. Pt denies fever, chills, or any other sxs or complaints.      PCP: Matthew Santana MD    Current Facility-Administered Medications   Medication Dose Route Frequency Provider Last Rate Last Dose    piperacillin-tazobactam (ZOSYN) 3.375 g in 0.9% sodium chloride (MBP/ADV) 100 mL MBP  3.375 g IntraVENous Q8H Hardik Pierce MD        insulin lispro (HUMALOG) injection   SubCUTAneous Q6H Hardik Pierce MD        glucose chewable tablet 16 g  4 Tab Oral PRN Mishel Elliott MD        glucagon (GLUCAGEN) injection 1 mg  1 mg IntraMUSCular PRN Mishel Elliott MD        dextrose (D50W) injection syrg 12.5-25 g  25-50 mL IntraVENous PRN Mishel Elliott MD        dextrose 5% and 0.9% NaCl infusion  75 mL/hr IntraVENous CONTINUOUS Mishel Elliott MD        vancomycin (VANCOCIN) injection 1,000 mg  1 g IntraVENous ONCE Hardik Pierce MD        vancomycin (VANCOCIN) 1,000 mg in 0.9% sodium chloride (MBP/ADV) 250 mL adv  1,000 mg IntraVENous Q12H Mishel Elliott MD        Vancomycin- pharmacy to dose  1 Each Other Rx Dosing/Monitoring Mishel Elliott MD        vancomycin (VANCOCIN) 1,000 mg in 0.9% sodium chloride (MBP/ADV) 250 mL adv  1,000 mg IntraVENous ONCE Stewart Ruiz  mL/hr at 07/26/18 0021 1,000 mg at 07/26/18 0021    0.9% sodium chloride (MBP/ADV) infusion              Current Outpatient Prescriptions   Medication Sig Dispense Refill    sertraline (ZOLOFT) 100 mg tablet Take 100 mg by mouth two (2) times a day.  docusate sodium (COLACE) 100 mg capsule Take 100 mg by mouth four (4) times daily as needed for Constipation.  QUEtiapine (SEROQUEL) 300 mg tablet Take 300 mg by mouth two (2) times a day. Indications: DEPRESSION TREATMENT ADJUNCT      acetaminophen (TYLENOL) 325 mg tablet Take 2 Tabs by mouth every four (4) hours as needed. 120 Tab 0    oxyCODONE IR (ROXICODONE) 5 mg immediate release tablet Take 2 Tabs by mouth every four (4) hours as needed. Max Daily Amount: 60 mg. 60 Tab 0    traMADol (ULTRAM) 50 mg tablet Take 2 Tabs by mouth four (4) times daily. Max Daily Amount: 400 mg. 60 Tab 0    clonazePAM (KLONOPIN) 0.5 mg tablet Take 0.5 mg by mouth two (2) times daily as needed.  calcium polycarbophil (FIBER-TABS) 625 mg tablet Take 625 mg by mouth daily.  acetaminophen (TYLENOL) 325 mg tablet Take  by mouth every four (4) hours as needed for Pain.  clopidogrel (PLAVIX) 75 mg tab Take 75 mg by mouth.  atorvastatin (LIPITOR) 20 mg tablet Take 20 mg by mouth nightly.  lisinopril (PRINIVIL, ZESTRIL) 5 mg tablet Take 5 mg by mouth daily.  nitroglycerin (NITROSTAT) 0.4 mg SL tablet 0.4 mg by SubLINGual route every five (5) minutes as needed for Chest Pain.  raNITIdine (ZANTAC) 300 mg tablet Take 300 mg by mouth daily.          Past History     Past Medical History:  Past Medical History:   Diagnosis Date    Acid reflux     Arthritis     CAD (coronary artery disease)     MI x 2 with stents    GERD (gastroesophageal reflux disease)     High cholesterol     Hypertension 2010       Past Surgical History:  Past Surgical History:   Procedure Laterality Date    HX CORONARY STENT PLACEMENT      HX HEENT      teeth removal    HX ORTHOPAEDIC Right     knee open procedure after accident    NEUROLOGICAL PROCEDURE UNLISTED      lower back       Family History:  History reviewed. No pertinent family history. Social History:  Social History   Substance Use Topics    Smoking status: Current Some Day Smoker     Packs/day: 0.25     Years: 40.00    Smokeless tobacco: Never Used      Comment: advised to hold all tobacco 24 hours prior     Alcohol use Yes      Comment: 2 x month       Allergies:  No Known Allergies      Review of Systems   Review of Systems   Constitutional: Negative for chills and fever. Musculoskeletal: Positive for arthralgias (right knee pain). Skin:        (+) Right knee wound dehiscence with wound drainage   All other systems reviewed and are negative. Physical Exam     Vitals:    07/25/18 2056 07/25/18 2148 07/25/18 2320 07/25/18 2345   BP: 120/88  137/86 (!) 116/96   Pulse: 84      Resp: 18      Temp: 97.7 °F (36.5 °C)      SpO2: 100% 100%     Weight: 89.8 kg (198 lb)      Height: 5' 11\" (1.803 m)        Physical Exam   Constitutional: He is oriented to person, place, and time. He appears well-developed and well-nourished. No distress. Appears in NAD   HENT:   Head: Normocephalic and atraumatic. Eyes: Pupils are equal, round, and reactive to light. Neck: Neck supple. Cardiovascular: Normal rate, regular rhythm, S1 normal, S2 normal and normal heart sounds. Pulmonary/Chest: Breath sounds normal. No respiratory distress. He has no wheezes. He has no rales. He exhibits no tenderness. Abdominal: Soft. He exhibits no distension and no mass. There is no tenderness. There is no guarding. Musculoskeletal: Normal range of motion. He exhibits no edema. Right knee: Tenderness found.    Right knee has chronic post-op skin changes; has a large wound dehiscence toward the distal end of the surgical wound that appears necrotic with a foul smell. Some tenderness to palpation of the knee, knee feels warm to touch. Lower extremity is neurovascularly intact. Neurological: He is alert and oriented to person, place, and time. No cranial nerve deficit. Skin: No rash noted. Psychiatric: He has a normal mood and affect. His behavior is normal. Thought content normal.   Nursing note and vitals reviewed. Diagnostic Study Results     Labs -     Recent Results (from the past 12 hour(s))   CBC WITH AUTOMATED DIFF    Collection Time: 07/25/18  9:50 PM   Result Value Ref Range    WBC 7.6 4.6 - 13.2 K/uL    RBC 4.59 (L) 4.70 - 5.50 M/uL    HGB 11.1 (L) 13.0 - 16.0 g/dL    HCT 35.3 (L) 36.0 - 48.0 %    MCV 76.9 74.0 - 97.0 FL    MCH 24.2 24.0 - 34.0 PG    MCHC 31.4 31.0 - 37.0 g/dL    RDW 18.3 (H) 11.6 - 14.5 %    PLATELET 702 350 - 702 K/uL    MPV 9.8 9.2 - 11.8 FL    NEUTROPHILS 56 40 - 73 %    LYMPHOCYTES 35 21 - 52 %    MONOCYTES 6 3 - 10 %    EOSINOPHILS 3 0 - 5 %    BASOPHILS 0 0 - 2 %    ABS. NEUTROPHILS 4.2 1.8 - 8.0 K/UL    ABS. LYMPHOCYTES 2.7 0.9 - 3.6 K/UL    ABS. MONOCYTES 0.5 0.05 - 1.2 K/UL    ABS. EOSINOPHILS 0.2 0.0 - 0.4 K/UL    ABS. BASOPHILS 0.0 0.0 - 0.1 K/UL    DF AUTOMATED     METABOLIC PANEL, COMPREHENSIVE    Collection Time: 07/25/18  9:50 PM   Result Value Ref Range    Sodium 138 136 - 145 mmol/L    Potassium 3.8 3.5 - 5.5 mmol/L    Chloride 100 100 - 108 mmol/L    CO2 29 21 - 32 mmol/L    Anion gap 9 3.0 - 18 mmol/L    Glucose 118 (H) 74 - 99 mg/dL    BUN 15 7.0 - 18 MG/DL    Creatinine 1.28 0.6 - 1.3 MG/DL    BUN/Creatinine ratio 12 12 - 20      GFR est AA >60 >60 ml/min/1.73m2    GFR est non-AA 58 (L) >60 ml/min/1.73m2    Calcium 9.3 8.5 - 10.1 MG/DL    Bilirubin, total 0.4 0.2 - 1.0 MG/DL    ALT (SGPT) 35 16 - 61 U/L    AST (SGOT) 19 15 - 37 U/L    Alk.  phosphatase 130 (H) 45 - 117 U/L    Protein, total 7.6 6.4 - 8.2 g/dL    Albumin 3.3 (L) 3.4 - 5.0 g/dL Globulin 4.3 (H) 2.0 - 4.0 g/dL    A-G Ratio 0.8 0.8 - 1.7     CARDIAC PANEL,(CK, CKMB & TROPONIN)    Collection Time: 07/25/18  9:50 PM   Result Value Ref Range    CK 81 39 - 308 U/L    CK - MB <1.0 <3.6 ng/ml    CK-MB Index  0.0 - 4.0 %     CALCULATION NOT PERFORMED WHEN RESULT IS BELOW LINEAR LIMIT    Troponin-I, Qt. <0.02 0.00 - 0.06 NG/ML   LACTIC ACID    Collection Time: 07/25/18  9:50 PM   Result Value Ref Range    Lactic acid 1.7 0.4 - 2.0 MMOL/L   PROTHROMBIN TIME + INR    Collection Time: 07/25/18  9:50 PM   Result Value Ref Range    Prothrombin time 13.1 11.5 - 15.2 sec    INR 1.0 0.8 - 1.2     PTT    Collection Time: 07/25/18  9:50 PM   Result Value Ref Range    aPTT 34.8 23.0 - 36.4 SEC   SED RATE (ESR)    Collection Time: 07/25/18  9:50 PM   Result Value Ref Range    Sed rate, automated 24 (H) 0 - 20 mm/hr       Radiologic Studies -    No orders to display     CT Results  (Last 48 hours)    None        CXR Results  (Last 48 hours)    None            Medical Decision Making   I am the first provider for this patient. I reviewed the vital signs, available nursing notes, past medical history, past surgical history, family history and social history. Vital Signs-Reviewed the patient's vital signs.     Pulse Oximetry Analysis - 100% on RA     Records Reviewed: Nursing Notes and Old Medical Records    Provider Notes (Medical Decision Making):     Procedures:  Procedures    MEDICATIONS GIVEN:  Medications   vancomycin (VANCOCIN) 1,000 mg in 0.9% sodium chloride (MBP/ADV) 250 mL adv (1,000 mg IntraVENous New Bag 7/26/18 0021)   0.9% sodium chloride (MBP/ADV) infusion (not administered)   piperacillin-tazobactam (ZOSYN) 3.375 g in 0.9% sodium chloride (MBP/ADV) 100 mL MBP (not administered)   insulin lispro (HUMALOG) injection (not administered)   glucose chewable tablet 16 g (not administered)   glucagon (GLUCAGEN) injection 1 mg (not administered)   dextrose (D50W) injection syrg 12.5-25 g (not administered)   dextrose 5% and 0.9% NaCl infusion (not administered)   vancomycin (VANCOCIN) injection 1,000 mg (not administered)   vancomycin (VANCOCIN) 1,000 mg in 0.9% sodium chloride (MBP/ADV) 250 mL adv (not administered)   Vancomycin- pharmacy to dose (not administered)   piperacillin-tazobactam (ZOSYN) 3.375 g in 0.9% sodium chloride (MBP/ADV) 100 mL MBP (0 g IntraVENous IV Completed 7/25/18 2344)   morphine injection 4 mg (4 mg IntraVENous Given 7/25/18 2314)   morphine injection 4 mg (4 mg IntraVENous Given 7/26/18 0021)       ED Course:   10:05 PM   Initial assessment performed. The patients presenting problems have been discussed, and they are in agreement with the care plan formulated and outlined with them. I have encouraged them to ask questions as they arise throughout their visit. 12:28 AM Discussed patient's history, exam, and available diagnostics results with Rani Byrd MD, internal medicine, who want orthopedics to admit pt.    1:05 AM Discussed patient's history, exam, and available diagnostics results with Susan Ca. Fernando Perdomo MD, orthopedic surgeon, who will admit the patient to orthopedics. Diagnosis and Disposition     Core Measures:  For Hospitalized Patients:    1. Hospitalization Decision Time:  The decision to hospitalize the patient was made by Beto Schmidt MD at 1:05 AM on 7/26/2018    2. Aspirin: Aspirin was not given because the patient did not present with a stroke at the time of their Emergency Department evaluation    1:07 AM  Patient is being admitted to the hospital by Susan Ca. Fernando Perdomo MD. The results of their tests and reasons for their admission have been discussed with them and/or available family. They convey agreement and understanding for the need to be admitted and for their admission diagnosis. CONDITIONS ON ADMISSION:  Sepsis is not present at the time of admission. Deep Vein Thrombosis is not present at the time of admission.  Thrombosis is not present at the time of admission. Urinary Tract Infection is not present at the time of admission. Pneumonia is not present at the time of admission. MRSA is not present at the time of admission. Wound infection is present at the time of admission. Pressure Ulcer is not present at the time of admission. CLINICAL IMPRESSION:    1. Wound infection        PLAN:  1. Admit to orthopedics  _______________________________    Attestations: This note is prepared by Arley Smith, acting as Scribe for [employer], MD.    [employer], MD:  The scribe's documentation has been prepared under my direction and personally reviewed by me in its entirety.   I confirm that the note above accurately reflects all work, treatment, procedures, and medical decision making performed by me.  _______________________________

## 2018-07-26 NOTE — ADT AUTH CERT NOTES
Facility Name: [facility]            NPI: [id number]  Tax ID: [id number]                   Patient Demographics      Patient Name Sex  Address Phone     Nanci Walden Male 1960 [address] [place] [address] [phone] (Home)  [phone] [facility] Account      Name Acct ID Class Status Primary Coverage     Nanci Walden [id number] [id number] [employer] [place] [employer] [address] HMO              Guarantor Account (for Hospital Account [de-identified])      Name Relation to Pt Service Area Active? Acct Type     Nanci Walden Self [facility] Yes Personal/Family     Address Phone           [address] [place]  [employer], [address] [facility] [phone](Y)                Coverage Information (for Hospital Account [de-identified])      1. [facility] [address] MEDICARE HMO      F/O Payor/Plan Subscriber  Subscriber Sex Precert #     [place] [address] AN[employer] [facility] MEDICARE HMO 62 M      Subscriber Subscriber #     Nanci Walden [id number]     Grp # Group Name     [facility]      Address Phone     [address] [facility], [address] [place] [address]      Policy Number Status Effective Date Benefits Phone     [id number] -  -     Auth/Cert                 2. [employer] [place]/[place] [employer] [facility]      F/O Payor/Plan Subscriber  Subscriber Sex Precert #     [facility] 62 [facility]      Subscriber Subscriber #     Nanci Walden [id number]     Grp # Group Name     [id number] none     Address Phone     [address]      Policy Number Status Effective Date Benefits Phone     [id number] -  -     Auth/Cert                          Diagnosis         Codes Comments     Wound infection  ICD-10-CM: T14. 8XXA, L08.9  ICD-9-CM: 560. 3        Admission Information - Patient Record Only      Arrival Date/Time: 20189 Admit Date/Time: 2018 2122 IP Adm.  Date/Time: 2018 0248     Admission Type: Emergency Point of Origin: Non-health Care Facility/self Admit Category:      Means of Arrival: Car Primary Service: Surgery Secondary Service: N/A     Transfer Source:  Service Area: [address] [place] [address] Unit: [facility] [address]     Admit Provider: Abiodun Welsh MD Attending Provider: Gustavo Rodriguez MD Referring Provider:        Admission Information      Attending Provider Admission Dx Admitted On     Pablo Parry MD  07/25/18     Service Isolation Code Status     SURGERY  Not on file     Allergies           No Known Allergies         Admission Information      Unit/Bed: [address] [place] [address] Service: SURGERY     Admitting provider: Abiodun Welsh MD Phone: [phone]     Attending provider: Pablo Parry MD Phone: [phone]     PCP: Yash Macias MD Phone: [phone]     Admission dx: Wound infection Patient class: I     Admission type: ER           Attending Provider:   Marc Blancas  NPI: [id number]  Wound infection ICD-10-CM: T14. 8XXA, L08.9

## 2018-07-26 NOTE — PROGRESS NOTES
0743  Pt is awake, alert, oriented x 4. Lying in bed. Ace wrap dressing to right knee dry and intact. Pt denies numbness and has + sensation to right leg. Right leg is slightly swollen. Unable to palpate right pedal pulse but + by doppler. Pt is NPO for OR.    10:01 AM   Pt was complaining of severe pain. NAI Arriaga wa notified by Nurse Manager Char Nicole but PA stated that He will be  on Dr Stan Francois service. Dr Yoselin Larry was made aware of pt's admission and complaint of pain. Orders were given  by Dr Yoselin Larry. Dilaudid 2 mg IV given. Dr Yoselin Larry to come see pt at about 12noon. Wound care made aware of Wound care consult. Lungs are clear. Abdomen soft with active BS. 10:47 AM   Pain level 7/10 and more comfortable. 1135   Urine for  Drug screen sent to lab.   12:10 PM   Pt's pain level 8/10. Took scheduled dose of Tramadol 100 mg  and Tylenol 650mg po.  12:31 PM  Pt was seen by Dr Yoselin Larry. Dr Yoselin Larry, wound care Nurse and RN in to see pt. Dr Yoselin Larry aware that pedal pulse to right foot was not palpable but  + by Doppler. Ace wrap dressing was removed. Pt has a large blackened area on his knee ans serosanguinous drainage. Pt's wound is malodorous. Wound was cleansed with H2O2 then abd, kerlix and ace wrap dressing. Placed on  Diabetic diet. Pt is NPO p MN for OR in AM at 0730. Consent  For R knee debridment and placement of Wound vac tomorrow. Consent  was signed and witnessed by RN. 1315  Pt medicated with Oxycodone 10 mg po for pain level 7/10.    3:29 PM   Resting in bed. Pain level 7/10. Girl friend at bedside. 1638  Pt's BP 91/56 Pr 88. NAI Mcrae was paged. Thea Thayer ordered NSS 1 l  bolus over 1 hr .  7:18 PM   BP 98/48 MO 82 after IV bolus. 7:33 PM Dr Argenis Paredes aware of sabove BR after b olus of 1 L. Ordered another bolus of 500 NSS over 1 hr and maintenance IV NSS at 100 ml/hr.

## 2018-07-26 NOTE — ED NOTES
TRANSFER - OUT REPORT:    Verbal report given to Vandana Boss RN on Darylene Dike  being transferred to [facility] (ortho unit) for routine progression of care       Report consisted of patients Situation, Background, Assessment and   Recommendations(SBAR). Information from the following report(s) SBAR, ED Summary and MAR was reviewed with the receiving nurse. Lines:   Peripheral IV 07/25/18 Left Antecubital (Active)   Site Assessment Clean, dry, & intact 7/25/2018 10:02 PM   Phlebitis Assessment 0 7/25/2018 10:02 PM   Infiltration Assessment 0 7/25/2018 10:02 PM   Dressing Status Clean, dry, & intact 7/25/2018 10:02 PM   Dressing Type Transparent 7/25/2018 10:02 PM   Hub Color/Line Status Pink;Patent; Flushed 7/25/2018 10:02 PM   Action Taken Blood drawn 7/25/2018 10:02 PM        Opportunity for questions and clarification was provided.       Patient transported with:   Registered Nurse

## 2018-07-27 ENCOUNTER — ANESTHESIA EVENT (OUTPATIENT)
Dept: SURGERY | Age: 58
DRG: 863 | End: 2018-07-27
Payer: MEDICARE

## 2018-07-27 ENCOUNTER — ANESTHESIA (OUTPATIENT)
Dept: SURGERY | Age: 58
DRG: 863 | End: 2018-07-27
Payer: MEDICARE

## 2018-07-27 LAB
ANION GAP SERPL CALC-SCNC: 3 MMOL/L (ref 3–18)
BASOPHILS # BLD: 0 K/UL (ref 0–0.1)
BASOPHILS NFR BLD: 0 % (ref 0–2)
BUN SERPL-MCNC: 13 MG/DL (ref 7–18)
BUN/CREAT SERPL: 9 (ref 12–20)
CALCIUM SERPL-MCNC: 8.5 MG/DL (ref 8.5–10.1)
CHLORIDE SERPL-SCNC: 107 MMOL/L (ref 100–108)
CO2 SERPL-SCNC: 29 MMOL/L (ref 21–32)
CREAT SERPL-MCNC: 1.41 MG/DL (ref 0.6–1.3)
DATE LAST DOSE: NORMAL
DIFFERENTIAL METHOD BLD: ABNORMAL
EOSINOPHIL # BLD: 0.2 K/UL (ref 0–0.4)
EOSINOPHIL NFR BLD: 4 % (ref 0–5)
ERYTHROCYTE [DISTWIDTH] IN BLOOD BY AUTOMATED COUNT: 18.9 % (ref 11.6–14.5)
GLUCOSE BLD STRIP.AUTO-MCNC: 110 MG/DL (ref 70–110)
GLUCOSE BLD STRIP.AUTO-MCNC: 121 MG/DL (ref 70–110)
GLUCOSE BLD STRIP.AUTO-MCNC: 129 MG/DL (ref 70–110)
GLUCOSE BLD STRIP.AUTO-MCNC: 177 MG/DL (ref 70–110)
GLUCOSE BLD STRIP.AUTO-MCNC: 223 MG/DL (ref 70–110)
GLUCOSE SERPL-MCNC: 110 MG/DL (ref 74–99)
HCT VFR BLD AUTO: 29.9 % (ref 36–48)
HGB BLD-MCNC: 9.2 G/DL (ref 13–16)
LYMPHOCYTES # BLD: 2.3 K/UL (ref 0.9–3.6)
LYMPHOCYTES NFR BLD: 38 % (ref 21–52)
MCH RBC QN AUTO: 23.8 PG (ref 24–34)
MCHC RBC AUTO-ENTMCNC: 30.8 G/DL (ref 31–37)
MCV RBC AUTO: 77.5 FL (ref 74–97)
MONOCYTES # BLD: 0.4 K/UL (ref 0.05–1.2)
MONOCYTES NFR BLD: 7 % (ref 3–10)
NEUTS SEG # BLD: 3 K/UL (ref 1.8–8)
NEUTS SEG NFR BLD: 51 % (ref 40–73)
PLATELET # BLD AUTO: 235 K/UL (ref 135–420)
PMV BLD AUTO: 9.4 FL (ref 9.2–11.8)
POTASSIUM SERPL-SCNC: 4.4 MMOL/L (ref 3.5–5.5)
RBC # BLD AUTO: 3.86 M/UL (ref 4.7–5.5)
REPORTED DOSE,DOSE: NORMAL UNITS
REPORTED DOSE/TIME,TMG: 418
SODIUM SERPL-SCNC: 139 MMOL/L (ref 136–145)
VANCOMYCIN TROUGH SERPL-MCNC: 11.4 UG/ML (ref 10–20)
WBC # BLD AUTO: 6 K/UL (ref 4.6–13.2)

## 2018-07-27 PROCEDURE — 77030013708 HC HNDPC SUC IRR PULS STRY –B: Performed by: ORTHOPAEDIC SURGERY

## 2018-07-27 PROCEDURE — 85025 COMPLETE CBC W/AUTO DIFF WBC: CPT | Performed by: PHYSICIAN ASSISTANT

## 2018-07-27 PROCEDURE — 76210000016 HC OR PH I REC 1 TO 1.5 HR: Performed by: ORTHOPAEDIC SURGERY

## 2018-07-27 PROCEDURE — 74011250636 HC RX REV CODE- 250/636: Performed by: ORTHOPAEDIC SURGERY

## 2018-07-27 PROCEDURE — 77030002966 HC SUT PDS J&J -A: Performed by: ORTHOPAEDIC SURGERY

## 2018-07-27 PROCEDURE — 36415 COLL VENOUS BLD VENIPUNCTURE: CPT | Performed by: PHYSICIAN ASSISTANT

## 2018-07-27 PROCEDURE — 74011250636 HC RX REV CODE- 250/636: Performed by: ANESTHESIOLOGY

## 2018-07-27 PROCEDURE — 65270000029 HC RM PRIVATE

## 2018-07-27 PROCEDURE — 74011000258 HC RX REV CODE- 258: Performed by: INTERNAL MEDICINE

## 2018-07-27 PROCEDURE — 74011250636 HC RX REV CODE- 250/636: Performed by: INTERNAL MEDICINE

## 2018-07-27 PROCEDURE — 74011250636 HC RX REV CODE- 250/636

## 2018-07-27 PROCEDURE — 82962 GLUCOSE BLOOD TEST: CPT

## 2018-07-27 PROCEDURE — 77030003028 HC SUT VCRL J&J -A: Performed by: ORTHOPAEDIC SURGERY

## 2018-07-27 PROCEDURE — 97161 PT EVAL LOW COMPLEX 20 MIN: CPT

## 2018-07-27 PROCEDURE — 80202 ASSAY OF VANCOMYCIN: CPT | Performed by: INTERNAL MEDICINE

## 2018-07-27 PROCEDURE — 74011250637 HC RX REV CODE- 250/637: Performed by: ORTHOPAEDIC SURGERY

## 2018-07-27 PROCEDURE — 74011000250 HC RX REV CODE- 250

## 2018-07-27 PROCEDURE — 74011636637 HC RX REV CODE- 636/637: Performed by: INTERNAL MEDICINE

## 2018-07-27 PROCEDURE — 76060000032 HC ANESTHESIA 0.5 TO 1 HR: Performed by: ORTHOPAEDIC SURGERY

## 2018-07-27 PROCEDURE — 77030036563 HC WRP CLD THER KNE S2SG -B: Performed by: ORTHOPAEDIC SURGERY

## 2018-07-27 PROCEDURE — 77030038020 HC MANFLD NEPTUNE STRY -B: Performed by: ORTHOPAEDIC SURGERY

## 2018-07-27 PROCEDURE — 76010000138 HC OR TIME 0.5 TO 1 HR: Performed by: ORTHOPAEDIC SURGERY

## 2018-07-27 PROCEDURE — 74011250636 HC RX REV CODE- 250/636: Performed by: HOSPITALIST

## 2018-07-27 PROCEDURE — 77030018834: Performed by: ORTHOPAEDIC SURGERY

## 2018-07-27 PROCEDURE — 80048 BASIC METABOLIC PNL TOTAL CA: CPT | Performed by: PHYSICIAN ASSISTANT

## 2018-07-27 PROCEDURE — 97606 NEG PRS WND THER DME>50 SQCM: CPT

## 2018-07-27 PROCEDURE — 0H9KXZX DRAINAGE OF RIGHT LOWER LEG SKIN, EXTERNAL APPROACH, DIAGNOSTIC: ICD-10-PCS | Performed by: ORTHOPAEDIC SURGERY

## 2018-07-27 PROCEDURE — 77030012508 HC MSK AIRWY LMA AMBU -A: Performed by: ANESTHESIOLOGY

## 2018-07-27 PROCEDURE — 77030008462 HC STPLR SKN PROX J&J -A: Performed by: ORTHOPAEDIC SURGERY

## 2018-07-27 PROCEDURE — 97116 GAIT TRAINING THERAPY: CPT

## 2018-07-27 PROCEDURE — 74011000250 HC RX REV CODE- 250: Performed by: ORTHOPAEDIC SURGERY

## 2018-07-27 RX ORDER — PROPOFOL 10 MG/ML
INJECTION, EMULSION INTRAVENOUS AS NEEDED
Status: DISCONTINUED | OUTPATIENT
Start: 2018-07-27 | End: 2018-07-27 | Stop reason: HOSPADM

## 2018-07-27 RX ORDER — GLYCOPYRROLATE 0.2 MG/ML
INJECTION INTRAMUSCULAR; INTRAVENOUS AS NEEDED
Status: DISCONTINUED | OUTPATIENT
Start: 2018-07-27 | End: 2018-07-27 | Stop reason: HOSPADM

## 2018-07-27 RX ORDER — DEXAMETHASONE SODIUM PHOSPHATE 4 MG/ML
INJECTION, SOLUTION INTRA-ARTICULAR; INTRALESIONAL; INTRAMUSCULAR; INTRAVENOUS; SOFT TISSUE AS NEEDED
Status: DISCONTINUED | OUTPATIENT
Start: 2018-07-27 | End: 2018-07-27 | Stop reason: HOSPADM

## 2018-07-27 RX ORDER — MAGNESIUM SULFATE 100 %
4 CRYSTALS MISCELLANEOUS AS NEEDED
Status: DISCONTINUED | OUTPATIENT
Start: 2018-07-27 | End: 2018-07-27 | Stop reason: HOSPADM

## 2018-07-27 RX ORDER — HYDROMORPHONE HYDROCHLORIDE 2 MG/ML
0.5 INJECTION, SOLUTION INTRAMUSCULAR; INTRAVENOUS; SUBCUTANEOUS
Status: DISCONTINUED | OUTPATIENT
Start: 2018-07-27 | End: 2018-07-27 | Stop reason: HOSPADM

## 2018-07-27 RX ORDER — LIDOCAINE HYDROCHLORIDE 20 MG/ML
INJECTION, SOLUTION EPIDURAL; INFILTRATION; INTRACAUDAL; PERINEURAL AS NEEDED
Status: DISCONTINUED | OUTPATIENT
Start: 2018-07-27 | End: 2018-07-27 | Stop reason: HOSPADM

## 2018-07-27 RX ORDER — ONDANSETRON 4 MG/1
4 TABLET, ORALLY DISINTEGRATING ORAL
Status: DISCONTINUED | OUTPATIENT
Start: 2018-07-27 | End: 2018-07-30 | Stop reason: HOSPADM

## 2018-07-27 RX ORDER — FENTANYL CITRATE 50 UG/ML
INJECTION, SOLUTION INTRAMUSCULAR; INTRAVENOUS AS NEEDED
Status: DISCONTINUED | OUTPATIENT
Start: 2018-07-27 | End: 2018-07-27 | Stop reason: HOSPADM

## 2018-07-27 RX ORDER — NALOXONE HYDROCHLORIDE 0.4 MG/ML
0.4 INJECTION, SOLUTION INTRAMUSCULAR; INTRAVENOUS; SUBCUTANEOUS AS NEEDED
Status: DISCONTINUED | OUTPATIENT
Start: 2018-07-27 | End: 2018-07-27 | Stop reason: HOSPADM

## 2018-07-27 RX ORDER — VANCOMYCIN HYDROCHLORIDE
1250 EVERY 12 HOURS
Status: DISCONTINUED | OUTPATIENT
Start: 2018-07-27 | End: 2018-07-30 | Stop reason: HOSPADM

## 2018-07-27 RX ORDER — FENTANYL CITRATE 50 UG/ML
50 INJECTION, SOLUTION INTRAMUSCULAR; INTRAVENOUS
Status: DISCONTINUED | OUTPATIENT
Start: 2018-07-27 | End: 2018-07-27 | Stop reason: HOSPADM

## 2018-07-27 RX ORDER — INSULIN LISPRO 100 [IU]/ML
INJECTION, SOLUTION INTRAVENOUS; SUBCUTANEOUS ONCE
Status: DISCONTINUED | OUTPATIENT
Start: 2018-07-27 | End: 2018-07-27 | Stop reason: HOSPADM

## 2018-07-27 RX ORDER — KETOROLAC TROMETHAMINE 30 MG/ML
INJECTION, SOLUTION INTRAMUSCULAR; INTRAVENOUS AS NEEDED
Status: DISCONTINUED | OUTPATIENT
Start: 2018-07-27 | End: 2018-07-27 | Stop reason: HOSPADM

## 2018-07-27 RX ORDER — DEXTROSE 50 % IN WATER (D50W) INTRAVENOUS SYRINGE
25-50 AS NEEDED
Status: DISCONTINUED | OUTPATIENT
Start: 2018-07-27 | End: 2018-07-27 | Stop reason: HOSPADM

## 2018-07-27 RX ORDER — ONDANSETRON 2 MG/ML
INJECTION INTRAMUSCULAR; INTRAVENOUS AS NEEDED
Status: DISCONTINUED | OUTPATIENT
Start: 2018-07-27 | End: 2018-07-27 | Stop reason: HOSPADM

## 2018-07-27 RX ORDER — FLUMAZENIL 0.1 MG/ML
0.2 INJECTION INTRAVENOUS
Status: DISCONTINUED | OUTPATIENT
Start: 2018-07-27 | End: 2018-07-27 | Stop reason: HOSPADM

## 2018-07-27 RX ORDER — SODIUM CHLORIDE 0.9 % (FLUSH) 0.9 %
5-10 SYRINGE (ML) INJECTION AS NEEDED
Status: DISCONTINUED | OUTPATIENT
Start: 2018-07-27 | End: 2018-07-27 | Stop reason: HOSPADM

## 2018-07-27 RX ORDER — MIDAZOLAM HYDROCHLORIDE 1 MG/ML
INJECTION, SOLUTION INTRAMUSCULAR; INTRAVENOUS AS NEEDED
Status: DISCONTINUED | OUTPATIENT
Start: 2018-07-27 | End: 2018-07-27 | Stop reason: HOSPADM

## 2018-07-27 RX ORDER — TRAMADOL HYDROCHLORIDE 50 MG/1
100 TABLET ORAL 4 TIMES DAILY
Status: DISCONTINUED | OUTPATIENT
Start: 2018-07-27 | End: 2018-07-27

## 2018-07-27 RX ORDER — SODIUM CHLORIDE, SODIUM LACTATE, POTASSIUM CHLORIDE, CALCIUM CHLORIDE 600; 310; 30; 20 MG/100ML; MG/100ML; MG/100ML; MG/100ML
125 INJECTION, SOLUTION INTRAVENOUS CONTINUOUS
Status: DISCONTINUED | OUTPATIENT
Start: 2018-07-27 | End: 2018-07-27 | Stop reason: HOSPADM

## 2018-07-27 RX ADMIN — RANITIDINE 300 MG: 150 TABLET ORAL at 21:01

## 2018-07-27 RX ADMIN — TRAMADOL HYDROCHLORIDE 100 MG: 50 TABLET, FILM COATED ORAL at 14:22

## 2018-07-27 RX ADMIN — OXYCODONE HYDROCHLORIDE 10 MG: 5 TABLET ORAL at 21:06

## 2018-07-27 RX ADMIN — CALCIUM POLYCARBOPHIL 625 MG: 625 TABLET, FILM COATED ORAL at 10:22

## 2018-07-27 RX ADMIN — FENTANYL CITRATE 25 MCG: 50 INJECTION, SOLUTION INTRAMUSCULAR; INTRAVENOUS at 07:56

## 2018-07-27 RX ADMIN — PIPERACILLIN SODIUM,TAZOBACTAM SODIUM 3.38 G: 3; .375 INJECTION, POWDER, FOR SOLUTION INTRAVENOUS at 10:22

## 2018-07-27 RX ADMIN — MIDAZOLAM HYDROCHLORIDE 2 MG: 1 INJECTION, SOLUTION INTRAMUSCULAR; INTRAVENOUS at 07:36

## 2018-07-27 RX ADMIN — FENTANYL CITRATE 50 MCG: 50 INJECTION, SOLUTION INTRAMUSCULAR; INTRAVENOUS at 08:30

## 2018-07-27 RX ADMIN — DOCUSATE SODIUM 100 MG: 100 CAPSULE, LIQUID FILLED ORAL at 10:23

## 2018-07-27 RX ADMIN — FENTANYL CITRATE 50 MCG: 50 INJECTION, SOLUTION INTRAMUSCULAR; INTRAVENOUS at 07:45

## 2018-07-27 RX ADMIN — FENTANYL CITRATE 50 MCG: 50 INJECTION, SOLUTION INTRAMUSCULAR; INTRAVENOUS at 08:40

## 2018-07-27 RX ADMIN — QUETIAPINE FUMARATE 300 MG: 100 TABLET ORAL at 10:22

## 2018-07-27 RX ADMIN — TRAMADOL HYDROCHLORIDE 100 MG: 50 TABLET, FILM COATED ORAL at 18:11

## 2018-07-27 RX ADMIN — ACETAMINOPHEN 650 MG: 325 TABLET, FILM COATED ORAL at 14:21

## 2018-07-27 RX ADMIN — QUETIAPINE FUMARATE 300 MG: 100 TABLET ORAL at 21:01

## 2018-07-27 RX ADMIN — INSULIN LISPRO 4 UNITS: 100 INJECTION, SOLUTION INTRAVENOUS; SUBCUTANEOUS at 18:00

## 2018-07-27 RX ADMIN — KETOROLAC TROMETHAMINE 15 MG: 30 INJECTION, SOLUTION INTRAMUSCULAR; INTRAVENOUS at 08:02

## 2018-07-27 RX ADMIN — HYDROMORPHONE HYDROCHLORIDE 0.5 MG: 2 INJECTION INTRAMUSCULAR; INTRAVENOUS; SUBCUTANEOUS at 09:03

## 2018-07-27 RX ADMIN — TRAMADOL HYDROCHLORIDE 100 MG: 50 TABLET, FILM COATED ORAL at 05:50

## 2018-07-27 RX ADMIN — ATORVASTATIN CALCIUM 20 MG: 20 TABLET, FILM COATED ORAL at 21:01

## 2018-07-27 RX ADMIN — PROPOFOL 150 MG: 10 INJECTION, EMULSION INTRAVENOUS at 07:45

## 2018-07-27 RX ADMIN — VANCOMYCIN HYDROCHLORIDE 1250 MG: 10 INJECTION, POWDER, LYOPHILIZED, FOR SOLUTION INTRAVENOUS at 18:07

## 2018-07-27 RX ADMIN — SODIUM CHLORIDE 1000 MG: 900 INJECTION, SOLUTION INTRAVENOUS at 04:18

## 2018-07-27 RX ADMIN — PIPERACILLIN SODIUM,TAZOBACTAM SODIUM 3.38 G: 3; .375 INJECTION, POWDER, FOR SOLUTION INTRAVENOUS at 15:50

## 2018-07-27 RX ADMIN — ONDANSETRON 4 MG: 2 INJECTION INTRAMUSCULAR; INTRAVENOUS at 08:02

## 2018-07-27 RX ADMIN — ACETAMINOPHEN 650 MG: 325 TABLET, FILM COATED ORAL at 18:11

## 2018-07-27 RX ADMIN — SODIUM CHLORIDE 100 ML/HR: 900 INJECTION, SOLUTION INTRAVENOUS at 21:00

## 2018-07-27 RX ADMIN — HYDROMORPHONE HYDROCHLORIDE 0.5 MG: 2 INJECTION INTRAMUSCULAR; INTRAVENOUS; SUBCUTANEOUS at 09:14

## 2018-07-27 RX ADMIN — FENTANYL CITRATE 25 MCG: 50 INJECTION, SOLUTION INTRAMUSCULAR; INTRAVENOUS at 07:58

## 2018-07-27 RX ADMIN — DEXAMETHASONE SODIUM PHOSPHATE 4 MG: 4 INJECTION, SOLUTION INTRA-ARTICULAR; INTRALESIONAL; INTRAMUSCULAR; INTRAVENOUS; SOFT TISSUE at 08:02

## 2018-07-27 RX ADMIN — GLYCOPYRROLATE 0.2 MG: 0.2 INJECTION INTRAMUSCULAR; INTRAVENOUS at 07:36

## 2018-07-27 RX ADMIN — LIDOCAINE HYDROCHLORIDE 75 MG: 20 INJECTION, SOLUTION EPIDURAL; INFILTRATION; INTRACAUDAL; PERINEURAL at 07:45

## 2018-07-27 RX ADMIN — PIPERACILLIN SODIUM,TAZOBACTAM SODIUM 3.38 G: 3; .375 INJECTION, POWDER, FOR SOLUTION INTRAVENOUS at 22:55

## 2018-07-27 RX ADMIN — TRAMADOL HYDROCHLORIDE 100 MG: 50 TABLET, FILM COATED ORAL at 10:24

## 2018-07-27 RX ADMIN — RANITIDINE 300 MG: 150 TABLET ORAL at 10:22

## 2018-07-27 RX ADMIN — ACETAMINOPHEN 650 MG: 325 TABLET, FILM COATED ORAL at 05:50

## 2018-07-27 NOTE — PERIOP NOTES
Family updated by Marietta Aldrich on [address] [facility], Helen Cortes RN will be the receiving nurse

## 2018-07-27 NOTE — PROGRESS NOTES
Problem: Mobility Impaired (Adult and Pediatric) Goal: *Acute Goals and Plan of Care (Insert Text) Physical Therapy Goals Initiated 7/27/2018 and to be accomplished within 5 day(s) 1. Patient will move from supine to sit and sit to supine  in bed with supervision/set-up. 2.  Patient will transfer from bed to chair and chair to bed with supervision/set-up using the least restrictive device. 3.  Patient will perform sit to stand with supervision/set-up. 4.  Patient will ambulate with supervision/set-up for >150 feet with the least restrictive device. 5.  Patient will ascend/descend 13 stairs with handrail(s) with minimal assistance/contact guard assist for safe home entry. physical Therapy EVALUATION Patient: Edgar Castillo (15 y.o. male) Date: 7/27/2018 Primary Diagnosis: Wound infection POST OP INFECTION RIGHT KNEE Procedure(s) (LRB): 
RIGHT KNEE INCISION AND DRAINAGE WITH WOUND VAC APPLICATION, PATIENT IS IN ROOM 202 (Right) Day of Surgery Precautions:  Fall, WBAT 
 
ASSESSMENT : 
Based on the objective data described below, Patient present to PT with decreased functional mobility with regard to bed mobility, transfers, gait, balance, weakness, and overall tolerance for activity secondary to R LE weakness and decreased AROM s/p R knee I&D with wound vac application. Pt motivated to participate with PT at this time; pt's wife very anxious and agitated during PT session. During gait training pt ambulated 90 feet with RW/GB use with a slow/antlagic/step-to gait pattern with decreased heel strike and decreased knee flexion during swing phase of gait. Left pt in bed with R LE elevated on pillow at the heel and ice packs in place. Recommend HHPT at time of discharge. Patient will benefit from skilled intervention to address the above impairments. Patients rehabilitation potential is considered to be Good Factors which may influence rehabilitation potential include:  
[]         None noted 
[]         Mental ability/status [x]         Medical condition 
[]         Home/family situation and support systems 
[]         Safety awareness 
[]         Pain tolerance/management 
[]         Other: PLAN : 
Recommendations and Planned Interventions: 
[x]           Bed Mobility Training             [x]    Neuromuscular Re-Education 
[x]           Transfer Training                   []    Orthotic/Prosthetic Training 
[x]           Gait Training                          []    Modalities [x]           Therapeutic Exercises          []    Edema Management/Control 
[x]           Therapeutic Activities            [x]    Patient and Family Training/Education 
[]           Other (comment): Frequency/Duration: Patient will be followed by physical therapy twice daily to address goals. Discharge Recommendations: Home Health Further Equipment Recommendations for Discharge: rolling walker SUBJECTIVE:  
Patient stated I really do want to get out of this bed.  OBJECTIVE DATA SUMMARY:  
 
Past Medical History:  
Diagnosis Date  Acid reflux  Arthritis  CAD (coronary artery disease) MI x 2 with stents  GERD (gastroesophageal reflux disease)  High cholesterol  Hypertension 2010 Past Surgical History:  
Procedure Laterality Date  HX CORONARY STENT PLACEMENT    
 HX HEENT    
 teeth removal  
 HX ORTHOPAEDIC Right   
 knee open procedure after accident  NEUROLOGICAL PROCEDURE UNLISTED    
 lower back Barriers to Learning/Limitations: None Compensate with: visual, verbal, tactile, kinesthetic cues/model Prior Level of Function/Home Situation: Pt stated he used a [facility] for home use. Home Situation Home Environment: Apartment # Steps to Enter: 20 One/Two Story Residence: Two story Living Alone: No 
Support Systems: Friends \ neighbors Patient Expects to be Discharged to[de-identified] [id number] Current DME Used/Available at Home: None Critical Behavior: 
Neurologic State: Alert; Appropriate for age Orientation Level: Appropriate for age;Oriented X4 Cognition: Appropriate decision making; Appropriate for age attention/concentration; Appropriate safety awareness; Follows commands Safety/Judgement: Awareness of environment; Fall prevention;Good awareness of safety precautions; Insight into deficits Psychosocial 
Patient Behaviors: Calm; Cooperative; Talkative Family  Behaviors: Hyper-vigilant; Anxious Purposeful Interaction: Yes Pt Identified Daily Priority: Clinical issues (comment) [facility] Process: Nurture loving kindness; Teaching/learning; Attend basic human needs;Create healing environment Caring Interventions: Reassure Reassure: Therapeutic listening; Informing;Caring rounds Therapeutic Modalities: Deep breathing;Humor Skin Condition/Temp: Dry;Warm Family  Behaviors: Hyper-vigilant; Anxious Skin Integrity: Incision (comment) Skin Integumentary Skin Color: Appropriate for ethnicity Skin Condition/Temp: Dry;Warm 
Skin Integrity: Incision (comment) Turgor: Non-tenting Hair Growth: Present Varicosities: Absent Strength:   
Strength: Generally decreased, functional (R LE decreased) Tone & Sensation:  
Tone: Normal 
 Sensation: Impaired Range Of Motion: 
AROM: Generally decreased, functional (R LE decreased) PROM: Generally decreased, functional (R LE decreased) Functional Mobility: 
Bed Mobility: 
Rolling: Contact guard assistance Supine to Sit: Contact guard assistance Sit to Supine: Contact guard assistance; Additional time Scooting: Contact guard assistance; Additional time Transfers: 
Sit to Stand: Contact guard assistance Stand to Sit: Contact guard assistance; Additional time Balance:  
Sitting: Intact Standing: Intact; With support Ambulation/Gait Training: 
Distance (ft): 90 Feet (ft) Assistive Device: Walker, rolling;Gait belt Ambulation - Level of Assistance: Contact guard assistance Gait Description (WDL): Exceptions to [facility] Gait Abnormalities: Antalgic; Step to gait Right Side Weight Bearing: As tolerated Base of Support: Shift to left Stance: Right decreased; Left increased Speed/Suad: Slow Step Length: Right shortened;Left shortened Swing Pattern: Left asymmetrical;Right asymmetrical 
 Interventions: Visual/Demos; Verbal cues; Tactile cues; Safety awareness training Therapeutic Exercises: HEP included ankle pumps, quad sets x 10 reps Pain: 
Pain Scale 1: Numeric (0 - 10) Pain Intensity 1: 4 Pain Location 1: Knee Pain Orientation 1: Right; Anterior Pain Description 1: Aching Pain Intervention(s) 1: Cold pack; Rest;Repositioned Activity Tolerance:  
Fair+ Please refer to the flowsheet for vital signs taken during this treatment. After treatment:  
[]         Patient left in no apparent distress sitting up in chair 
[x]         Patient left in no apparent distress in bed 
[x]         Call bell left within reach [x]         Nursing notified 
[x]         Caregiver present 
[]         Bed alarm activated COMMUNICATION/EDUCATION:  
[x]         Fall prevention education was provided and the patient/caregiver indicated understanding. [x]         Patient/family have participated as able in goal setting and plan of care. [x]         Patient/family agree to work toward stated goals and plan of care. []         Patient understands intent and goals of therapy, but is neutral about his/her participation. []         Patient is unable to participate in goal setting and plan of care. Thank you for this referral. 
 
Sanchez Arevalo PT, DPT Time Calculation: 29 mins Mobility: [id number] Current  CI= 1-19%   Goal  CI= 1-19%. The severity rating is based on the Other Level of assistance required for mobility.

## 2018-07-27 NOTE — PROGRESS NOTES
0029 TRANSFER - IN REPORT: 
 
Verbal report received from SERGIO Kidd RN on [facility]  being received from PACU for routine post - op. Report consisted of patients Situation, Background, Assessment and  
Recommendations(SBAR). Information from the following report(s) SBAR, Kardex, OR Summary, Intake/Output and MAR was reviewed with the receiving nurse. Opportunity for questions and clarification was provided. Assessment to be completed upon patients arrival to unit and care assumed.

## 2018-07-27 NOTE — PROGRESS NOTES
1000 
Assumed care at this time. Assessment completed in flowsheet. Pt is alert and oriented x 4. Denies SOB and chest pain. Pt lungs clear bilaterally. Capillary refill less than 3 seconds. Pt denies numbness and tingling to all extremities. Stated pain  0/10. Pt has 20G IV to the [facility] [place]. Pt has ace bandage dressing with a wound vac. Pt encouraged to continue use of IS, Pt verbalized understanding. Ice pack applied. Call light and possessions within reach. Bed is in the lowest position. Will continue to monitor. Family at bedside

## 2018-07-27 NOTE — PROGRESS NOTES
Pharmacy Dosing Services: Vancomycin Consult for Vancomycin Dosing by Pharmacy by Dr. Humberto Corrigan Consult provided for this 63y.o. year old male , for indication of bone and joint infection. Day of Therapy 2 Scr = 1.41   CrCl = 61.6 ml/min   WBC = 6 Vancomycin Trough:  11.4  (7/27/18 at 15:19)  below therapeutic range:  15 - 20 Dose adjusted to:  Vancomycin 1250 mg IV every 12 hours, scheduled for 7/27/18 at 17:00. Ht Readings from Last 1 Encounters:  
07/25/18 180.3 cm (71\") Wt Readings from Last 1 Encounters:  
07/25/18 89.8 kg (198 lb) Previous Regimen Vancomycin 1000 mg IV q12h Other Current Antibiotics Zosyn Significant Cultures Wound: gram + cocci / gram neg rods Serum Creatinine Lab Results Component Value Date/Time Creatinine 1.41 (H) 07/27/2018 03:24 AM  
  
Creatinine Clearance Estimated Creatinine Clearance: 61.6 mL/min (based on Cr of 1.41). BUN Lab Results Component Value Date/Time BUN 13 07/27/2018 03:24 AM  
  
WBC Lab Results Component Value Date/Time WBC 6.0 07/27/2018 03:24 AM  
  
H/H Lab Results Component Value Date/Time HGB 9.2 (L) 07/27/2018 03:24 AM  
  
Platelets Lab Results Component Value Date/Time PLATELET 239 71/69/9049 03:24 AM  
  
Temp 98 °F (36.7 °C) Pharmacy to follow daily and will make changes to dose and/or frequency based on clinical status. Pharmacist Amara Sanchez, [address]

## 2018-07-27 NOTE — ANESTHESIA POSTPROCEDURE EVALUATION
Post-Anesthesia Evaluation and Assessment Cardiovascular Function/Vital Signs Visit Vitals  /86  Pulse 80  Temp 36.6 °C (97.8 °F)  Resp 16  
 Ht 5' 11\" (1.803 m)  Wt 89.8 kg (198 lb)  SpO2 98%  BMI 27.62 kg/m2 Patient is status post Procedure(s): RIGHT KNEE INCISION AND DRAINAGE WITH WOUND VAC APPLICATION, PATIENT IS IN ROOM 202. Nausea/Vomiting: Controlled. Postoperative hydration reviewed and adequate. Pain: 
Pain Scale 1: FLACC (07/27/18 0920) Pain Intensity 1: 0 (07/27/18 0920) Managed. Neurological Status:  
Neuro (WDL): Within Defined Limits (07/27/18 0657) At baseline. Mental Status and Level of Consciousness: Baseline and appropriate for discharge. Pulmonary Status:  
O2 Device: Nasal cannula (07/27/18 0850) Adequate oxygenation and airway patent. Complications related to anesthesia: None Post-anesthesia assessment completed. No concerns. Patient has met all discharge requirements. Signed By: Sabine Aguero MD  
 July 27, 2018

## 2018-07-27 NOTE — PROGRESS NOTES
5 - Received report from Shilpi Oliveira RN. Assumed care of patient at this time. 2015 - Assessment complete. Please refer to flowsheet. 46 - Dr. Stephanie Mabry paged regarding low BP.    0882 - Received call back from Dr. Stephanie Mabry. Informed him of patient's low BP after 1000 cc bolus and 500 cc bolus. Physician stated to increase maintenance fluids to 125 ml/hr and continue to monitor. 2281 - Received call from Veronica Carvalho, [facility] who stated transport would be picking up patient at [address] [facility] - Call placed to Veronica Carvalho, RN to give report. She stated that she would not be taking report on the patient and to call [phone] to give report. 1 - Called [phone] to give report, but no answer. Cassi 27 again to give report, but no answer.

## 2018-07-27 NOTE — PROGRESS NOTES
Problem: Falls - Risk of  Goal: *Absence of Falls  Document Patricia Fall Risk and appropriate interventions in the flowsheet.    Outcome: Progressing Towards Goal  Fall Risk Interventions:  Mobility Interventions: Utilize gait belt for transfers/ambulation         Medication Interventions: Teach patient to arise slowly, Patient to call before getting OOB

## 2018-07-27 NOTE — OP NOTES
[facility] [place]  OPERATIVE REPORT    Irma Judd  MR#: [id number]  : 1960  ACCOUNT #: [de-identified]   DATE OF SERVICE: 2018    INDICATIONS:  A 52-year-old male who underwent a right knee replacement complicated by postop blistering requiring hospitalization. The patient has had a necrotic area in the midline of his suture line, was in therapy the other day and sustained a dehiscence along the suture line. He was admitted through the emergency room with possible infection; however, he has had a normal white count. He is currently on antibiotics. He does to the operating room for debridement and placement of a wound VAC. PREOPERATIVE DIAGNOSIS:  Right knee wound dehiscence. POSTOPERATIVE DIAGNOSIS:  Right knee wound dehiscence    PROCEDURE PERFORMED:  Right knee debridement, with placement of a wound VAC. SURGEON:  Joe Whitlock MD     ASSISTANT:  None. ANESTHESIA:  General.    ANESTHESIOLOGIST:  Dr. Rojelio Franklinom:  None. COMPLICATIONS:  None. ESTIMATED BLOOD LOSS:  Minimal.    IMPLANTS:  None. DESCRIPTION:  The patient was brought to the operating room. General anesthesia was administered. A tourniquet was placed, but not utilized. The right lower extremity was prepped and draped sterilely. The patient had about a 4 cm area laterally where the wound appeared to dehiscence. This was next to a necrotic area that was about 4 x 3 cm oval full-thickness necrotic area. A scalpel and curette was used to try to remove the eschar; however, this was full-thickness and this area was surgically debrided utilizing a scalpel. The bursal area had some clotted blood. The arthrotomy suture line was intact. At this point, we ran the surgical  through the area of about 2000 mL of fluid. Following this, there was a healthy granulation bed. However, there was still a defect in the skin of approximately 4 cm in diameter.   This could not be closed without placing edges under tension. At this point, the wound team came in and applied a wound VAC. The patient was awakened and went to recovery in stable condition.       MD GREG Green / PAL  D: 07/27/2018 08:11     T: 07/27/2018 08:31  JOB #: [id number]

## 2018-07-27 NOTE — PROGRESS NOTES
1555 - Patient in bed at this time. A/O x 4. IV to left hand  intact and patent. SCD to left leg. Ace wrap dressing to right leg CDI. No numbness/tingling. Pedal pulses palpable. Lungs CTA. Bowel sounds active to all quadrants. Pain 3/10. Wound vac in place to left leg with ace wrap covering. Shift summary-Patient ambulated in hallway with walker and PT. Pain controlled with scheduled pain medication. Voiding without difficulty. Wound vac in place and functioning to left leg.

## 2018-07-27 NOTE — PROGRESS NOTES
Chart reviewed, met with pt in room. Pt lives with his girlfriend who assists with ADLs/IADLs since original knee surgery. Pt states he plans discharge home, noted PT ordered. FOC offered, pt chose [facility] [phone] for home wound care/wound vac management, home IV abx if needed, PT; referral placed with CMS. Noted home vac ordered, will need someone to accept vac at home, could dc with wet to dry dressing if needed. Pt awaiting ID consult. Pt has RW for home. No plan finalized for weekend discharge, will follow up Monday. For dc home pt will need: Abx script PICC Wound care orders/home health orders Care Management Interventions PCP Verified by CM: Yes Transition of Care Consult (CM Consult): Home Health [address]: No 
Reason Outside [place]: Patient already serviced by other home care/hospice agency [facility]) Discharge Durable Medical Equipment: No 
Physical Therapy Consult: Yes Occupational Therapy Consult: Yes Current Support Network: Lives with Spouse Confirm Follow Up Transport: Family Plan discussed with Pt/Family/Caregiver: Yes Freedom of Choice Offered: Yes Discharge Location Discharge Placement: Home with home health (vs inpt rehab)

## 2018-07-27 NOTE — PROGRESS NOTES
1508 
Bedside and Verbal shift change report given to RADHA Toledo by Krystin Goodman RN. Report included the following information SBAR, Kardex, OR Summary, Intake/Output and MAR.

## 2018-07-27 NOTE — ANESTHESIA PREPROCEDURE EVALUATION
Anesthetic History Pertinent negatives: No PONV Review of Systems / Medical History Patient summary reviewed, nursing notes reviewed and pertinent labs reviewed Pulmonary Smoker (pt was an inpatient last PM and didn't smoke this AM) Pertinent negatives: No asthma and sleep apnea Neuro/Psych Pertinent negatives: No seizures and CVA Cardiovascular Hypertension (took meds this AM): well controlled CAD (s/p stent 10/17 with no further angina) and cardiac stents Pertinent negatives: No angina Exercise tolerance: >4 METS 
  
GI/Hepatic/Renal 
  
GERD (occasional): well controlled Pertinent negatives: No liver disease and renal disease (no chronic renal disease per chart review; Cr mildly elevated at 1.4) Endo/Other Arthritis Pertinent negatives: No diabetes (no diagnosis of DM but elevated Hgb A1C.  glucose 110) and obesity Other Findings Physical Exam 
 
Airway Mallampati: II 
TM Distance: 4 - 6 cm Neck ROM: normal range of motion Mouth opening: Normal 
 
 Cardiovascular Rhythm: regular Rate: normal 
 
 
 
 Dental 
 
Dentition: Full upper dentures and Poor dentition Pulmonary Breath sounds clear to auscultation Abdominal 
GI exam deferred Other Findings Anesthetic Plan ASA: 3 Anesthesia type: general 
 
 
 
 
Induction: Intravenous Anesthetic plan and risks discussed with: Patient Plan GA/LMA. Pt understands risks and agrees to proceed.

## 2018-07-27 NOTE — BRIEF OP NOTE
BRIEF OPERATIVE NOTE Date of Procedure: 7/27/2018 Preoperative Diagnosis:right knee wound dehisence Postoperative Diagnosis:same Procedure(s): RIGHT KNEE INCISION AND DRAINAGE WITH WOUND VAC APPLICATION Surgeon(s) and Role: Nida Acuña MD - Primary Surgical Assistant: none Surgical Staff: 
Circ-1: Delio Delacruz RN Scrub Tech-1: Lew Gitelman Surg Asst-1: Cyrus Swenson Event Time In Incision Start 2070 Incision Close 3117 Anesthesia: General  
Estimated Blood Loss: 10cc Specimens: * No specimens in log * Findings: full thickness eschar Complications: none Implants: * No implants in log *

## 2018-07-27 NOTE — ROUTINE PROCESS
Bedside and Verbal shift change report given to BETHANY Shah RN (oncoming nurse) by Nicolas RN (offgoing nurse). Report included the following information SBAR, Kardex, Intake/Output and MAR.

## 2018-07-27 NOTE — PROGRESS NOTES
Hospitalist Progress Note Patient: Eugenio No MRN: [id number]  CSN: [id number] YOB: 1960  Age: 63 y.o. Sex: male DOA: 7/25/2018 LOS:  LOS: 1 day Chief Complaint: 
 
Consult for Medical Management Assessment/Plan 1. Right Knee Infection/Necrotic Wound/Wound Dehiscence 2. HTN 3. CAD s/p Stent 4. HLD 5. DM 
 
1. Patient is s/p surgical washout and application of wound vac. If orthopedics would like an infectious disease consult, please call hospitalist provider directly to confirm. ID will not see the patient until verification has been obtained from orthopedics. I have attempted to page Dr Barrie Orozco without a return call as of yet. Otherwise, continue routine postoperative management, pain control, DVT ppx, PT/OT, dispo planning per primary team.  
2. Continue current regimen. Patient had periods of hypotension yesterday, which have resolved today. 3. Continue lipitor. 4. Lipitor. 5. Continue SSI and ADA diet. Will adjust based on patient's needs. Patient Active Problem List  
Diagnosis Code  Wound infection T14. 8XXA, L08.9 Subjective: 
 
Feels tired this afternoon, otherwise feels well postop. Review of systems: 
 
Constitutional: denies fevers, chills, myalgias Respiratory: denies SOB, cough Cardiovascular: denies chest pain, palpitations Gastrointestinal: denies nausea, vomiting, diarrhea Vital signs/Intake and Output: 
Visit Vitals  BP (!) 146/93  Pulse 76  Temp 97.4 °F (36.3 °C)  Resp 16  
 Ht 5' 11\" (1.803 m)  Wt 89.8 kg (198 lb)  SpO2 98%  BMI 27.62 kg/m2 Current Shift:  07/27 0701 - 07/27 1900 In: 1020 [P.O.:120; I.V.:900] Out: 1100 [Urine:1100] Last three shifts:  07/25 1901 - 07/27 0700 In: 2207 [I.V.:2207] Out: 1325 [CKXAH:2541] Exam: 
 
General: Well developed, alert, NAD, OX3 Head/Neck: NCAT, supple, No masses, No lymphadenopathy CVS:Regular rate and rhythm, no M/R/G, S1/S2 heard, no thrill Lungs:Clear to auscultation bilaterally, no wheezes, rhonchi, or rales Abdomen: Soft, Nontender, No distention, Normal Bowel sounds, No hepatomegaly Extremities: No C/C/E, pulses palpable 2+. Right knee wrapped with wound vac Skin:normal texture and turgor, no rashes, no lesions Neuro:grossly normal , follows commands Psych:appropriate Labs: Results:  
   
Chemistry Recent Labs  
   07/27/18 
 3278  07/25/18 2150 GLU  110*  118* NA  139  138  
K  4.4  3.8 CL  107  100 CO2  29  29 BUN  13  15 CREA  1.41*  1.28  
CA  8.5  9.3 AGAP  3  9 BUCR  9*  12  
AP   --   130* TP   --   7.6 ALB   --   3.3*  
GLOB   --   4.3* AGRAT   --   0.8 CBC w/Diff Recent Labs  
   07/27/18 
 0324  07/25/18 2150 WBC  6.0  7.6 RBC  3.86*  4.59* HGB  9.2*  11.1*  
HCT  29.9*  35.3*  
PLT  235  261 GRANS  51  56 LYMPH  38  35 EOS  4  3 Cardiac Enzymes Recent Labs  
   07/25/18 2150 CPK  81 CKND1  CALCULATION NOT PERFORMED WHEN RESULT IS BELOW LINEAR LIMIT Coagulation Recent Labs  
   07/25/18 2150 PTP  13.1 INR  1.0 APTT  34.8 Lipid Panel No results found for: CHOL, CHOLPOCT, CHOLX, CHLST, CHOLV, 523567, HDL, LDL, LDLC, DLDLP, 616363, VLDLC, VLDL, TGLX, TRIGL, TRIGP, TGLPOCT, CHHD, CHHDX  
BNP No results for input(s): BNPP in the last 72 hours. Liver Enzymes Recent Labs  
   07/25/18 2150 TP  7.6 ALB  3.3* AP  130* SGOT  19 Thyroid Studies No results found for: T4, T3U, TSH, TSHEXT Procedures/imaging: see electronic medical records for all procedures/Xrays and details which were not copied into this note but were reviewed prior to creation of Plan Christiano Cazares PA-C

## 2018-07-27 NOTE — WOUND CARE
Wound Care Progress Note Follow-up visit for [facility] application in OR Assessment:  
Pt under anesthesia for surgical procedure 1. Surgical wound, left knee dehiscence, debridement (wound location & etiology) = 6 x 5 x 4 cm Base is 100% of granulation tissue. Small sanguinous exudate. Periwound intact & without erythema. Treatment: [facility] therapy Wound Recommendations:   
Continue [facility] therapy per surgeons orders, Home wound vac ordered placed by this RN, Pt with medicare so vac will be provided by secondary provider ([facility]) which requires VAC to be shipped to pts home, where a responsible party must be available to sign for the [facility]. The company will be in contact with the patient/ family to provide delivery time, family member made aware that [facility] will not ship without some one to sign for its delivery. If [facility] approved and delivered after time of discharge, [facility] can be applied by home health and pt to be discharged with wet to dry dressing or dressing requested by surgeon. Orders for inpatient management available under Manage Orders tab. Skin Care & Pressure Relief Recommendations: 
Minimize layers of linen/pads under patient to optimize support surface. Turn/reposition approximately every 2 hours and offload heels pillows or Prevalon boots. Manage incontinence / promote continence; Proshield to buttocks and sacrum daily and as needed with incontinence care Discussed above plan with patient & Elliot GARCIA, Renay Youngblood MD 
 
Transition of Care: Plan to follow weekly and per product recommendations while admitted to hospital.

## 2018-07-27 NOTE — ROUTINE PROCESS
2007  Bedside and Verbal shift change report given to Laura Miner RN (oncoming nurse) by Yash Renteria RN (offgoing nurse). Report included the following information SBAR, Kardex and MAR.

## 2018-07-28 LAB
ANION GAP SERPL CALC-SCNC: 2 MMOL/L (ref 3–18)
BASOPHILS # BLD: 0 K/UL (ref 0–0.1)
BASOPHILS NFR BLD: 0 % (ref 0–2)
BUN SERPL-MCNC: 15 MG/DL (ref 7–18)
BUN/CREAT SERPL: 13 (ref 12–20)
CALCIUM SERPL-MCNC: 8.2 MG/DL (ref 8.5–10.1)
CHLORIDE SERPL-SCNC: 105 MMOL/L (ref 100–108)
CO2 SERPL-SCNC: 30 MMOL/L (ref 21–32)
CREAT SERPL-MCNC: 1.2 MG/DL (ref 0.6–1.3)
DIFFERENTIAL METHOD BLD: ABNORMAL
EOSINOPHIL # BLD: 0 K/UL (ref 0–0.4)
EOSINOPHIL NFR BLD: 0 % (ref 0–5)
ERYTHROCYTE [DISTWIDTH] IN BLOOD BY AUTOMATED COUNT: 18.7 % (ref 11.6–14.5)
GLUCOSE BLD STRIP.AUTO-MCNC: 115 MG/DL (ref 70–110)
GLUCOSE BLD STRIP.AUTO-MCNC: 156 MG/DL (ref 70–110)
GLUCOSE BLD STRIP.AUTO-MCNC: 203 MG/DL (ref 70–110)
GLUCOSE SERPL-MCNC: 157 MG/DL (ref 74–99)
HCT VFR BLD AUTO: 29.1 % (ref 36–48)
HGB BLD-MCNC: 8.8 G/DL (ref 13–16)
LYMPHOCYTES # BLD: 1.5 K/UL (ref 0.9–3.6)
LYMPHOCYTES NFR BLD: 19 % (ref 21–52)
MCH RBC QN AUTO: 23.5 PG (ref 24–34)
MCHC RBC AUTO-ENTMCNC: 30.2 G/DL (ref 31–37)
MCV RBC AUTO: 77.8 FL (ref 74–97)
MONOCYTES # BLD: 0.7 K/UL (ref 0.05–1.2)
MONOCYTES NFR BLD: 8 % (ref 3–10)
NEUTS SEG # BLD: 5.8 K/UL (ref 1.8–8)
NEUTS SEG NFR BLD: 73 % (ref 40–73)
PLATELET # BLD AUTO: 224 K/UL (ref 135–420)
PMV BLD AUTO: 9.7 FL (ref 9.2–11.8)
POTASSIUM SERPL-SCNC: 4.3 MMOL/L (ref 3.5–5.5)
RBC # BLD AUTO: 3.74 M/UL (ref 4.7–5.5)
SODIUM SERPL-SCNC: 137 MMOL/L (ref 136–145)
WBC # BLD AUTO: 8 K/UL (ref 4.6–13.2)

## 2018-07-28 PROCEDURE — 65270000029 HC RM PRIVATE

## 2018-07-28 PROCEDURE — 36415 COLL VENOUS BLD VENIPUNCTURE: CPT | Performed by: ORTHOPAEDIC SURGERY

## 2018-07-28 PROCEDURE — 74011250637 HC RX REV CODE- 250/637: Performed by: ORTHOPAEDIC SURGERY

## 2018-07-28 PROCEDURE — 74011000258 HC RX REV CODE- 258: Performed by: INTERNAL MEDICINE

## 2018-07-28 PROCEDURE — 82962 GLUCOSE BLOOD TEST: CPT

## 2018-07-28 PROCEDURE — 85025 COMPLETE CBC W/AUTO DIFF WBC: CPT | Performed by: ORTHOPAEDIC SURGERY

## 2018-07-28 PROCEDURE — 74011636637 HC RX REV CODE- 636/637: Performed by: INTERNAL MEDICINE

## 2018-07-28 PROCEDURE — 97116 GAIT TRAINING THERAPY: CPT

## 2018-07-28 PROCEDURE — 74011250636 HC RX REV CODE- 250/636: Performed by: INTERNAL MEDICINE

## 2018-07-28 PROCEDURE — 80048 BASIC METABOLIC PNL TOTAL CA: CPT | Performed by: ORTHOPAEDIC SURGERY

## 2018-07-28 PROCEDURE — 77030027138 HC INCENT SPIROMETER -A

## 2018-07-28 RX ADMIN — INSULIN LISPRO 2 UNITS: 100 INJECTION, SOLUTION INTRAVENOUS; SUBCUTANEOUS at 18:10

## 2018-07-28 RX ADMIN — VANCOMYCIN HYDROCHLORIDE 1250 MG: 10 INJECTION, POWDER, LYOPHILIZED, FOR SOLUTION INTRAVENOUS at 16:48

## 2018-07-28 RX ADMIN — ACETAMINOPHEN 650 MG: 325 TABLET, FILM COATED ORAL at 06:48

## 2018-07-28 RX ADMIN — VANCOMYCIN HYDROCHLORIDE 1250 MG: 10 INJECTION, POWDER, LYOPHILIZED, FOR SOLUTION INTRAVENOUS at 05:15

## 2018-07-28 RX ADMIN — CALCIUM POLYCARBOPHIL 625 MG: 625 TABLET, FILM COATED ORAL at 08:39

## 2018-07-28 RX ADMIN — INSULIN LISPRO 2 UNITS: 100 INJECTION, SOLUTION INTRAVENOUS; SUBCUTANEOUS at 00:54

## 2018-07-28 RX ADMIN — QUETIAPINE FUMARATE 300 MG: 100 TABLET ORAL at 08:39

## 2018-07-28 RX ADMIN — OXYCODONE HYDROCHLORIDE 10 MG: 5 TABLET ORAL at 21:32

## 2018-07-28 RX ADMIN — INSULIN LISPRO 4 UNITS: 100 INJECTION, SOLUTION INTRAVENOUS; SUBCUTANEOUS at 13:20

## 2018-07-28 RX ADMIN — ACETAMINOPHEN 650 MG: 325 TABLET, FILM COATED ORAL at 00:54

## 2018-07-28 RX ADMIN — ATORVASTATIN CALCIUM 20 MG: 20 TABLET, FILM COATED ORAL at 21:14

## 2018-07-28 RX ADMIN — ACETAMINOPHEN 650 MG: 325 TABLET, FILM COATED ORAL at 17:53

## 2018-07-28 RX ADMIN — TRAMADOL HYDROCHLORIDE 100 MG: 50 TABLET, FILM COATED ORAL at 06:48

## 2018-07-28 RX ADMIN — RANITIDINE 300 MG: 150 TABLET ORAL at 08:38

## 2018-07-28 RX ADMIN — RANITIDINE 300 MG: 150 TABLET ORAL at 21:14

## 2018-07-28 RX ADMIN — OXYCODONE HYDROCHLORIDE 10 MG: 5 TABLET ORAL at 08:38

## 2018-07-28 RX ADMIN — PIPERACILLIN SODIUM,TAZOBACTAM SODIUM 3.38 G: 3; .375 INJECTION, POWDER, FOR SOLUTION INTRAVENOUS at 15:39

## 2018-07-28 RX ADMIN — OXYCODONE HYDROCHLORIDE 10 MG: 5 TABLET ORAL at 15:53

## 2018-07-28 RX ADMIN — ACETAMINOPHEN 650 MG: 325 TABLET, FILM COATED ORAL at 13:20

## 2018-07-28 RX ADMIN — TRAMADOL HYDROCHLORIDE 100 MG: 50 TABLET, FILM COATED ORAL at 13:20

## 2018-07-28 RX ADMIN — TRAMADOL HYDROCHLORIDE 100 MG: 50 TABLET, FILM COATED ORAL at 00:54

## 2018-07-28 RX ADMIN — DOCUSATE SODIUM 100 MG: 100 CAPSULE, LIQUID FILLED ORAL at 08:39

## 2018-07-28 RX ADMIN — TRAMADOL HYDROCHLORIDE 100 MG: 50 TABLET, FILM COATED ORAL at 17:53

## 2018-07-28 RX ADMIN — QUETIAPINE FUMARATE 300 MG: 100 TABLET ORAL at 21:14

## 2018-07-28 RX ADMIN — PIPERACILLIN SODIUM,TAZOBACTAM SODIUM 3.38 G: 3; .375 INJECTION, POWDER, FOR SOLUTION INTRAVENOUS at 07:27

## 2018-07-28 NOTE — PROGRESS NOTES
615 [facility] care of patient at this time. Patient is alert and oriented x 4. Patient denies chest pain, shortness of breath, or numbness or tingling in the upper or lower extremities. Ace wrap on the right knee is clean, dry and intact. Wound Vac is maintaining negative pressure and is draining serosanguinous fluid. Venous foot pumps in place on the left lower extremity only. 22g IV in the left hand is patent and infusing NS @ 100mL/hr. Patient currently experiencing 7/10 pain. Bed is in lowest position with the wheels locked. Siderails x 3 are up. Call bell within reach. Patient is currently resting in bed in no apparent distress. Will continue to monitor. 2106 - Head to toe assessment performed at this time. Patient has no complaints of chest pain or shortness of breath. Denies any numbness or tingling in extremities. Lungs are clear to auscultation. Bowel sounds are present in all 4 quadrants. 22g IV in the left hand is patent and infusing with no signs of phlebitis or infiltration. Patient educated how to actively manage their pain. Patient encouraged to use the incentive spirometer. Patient educated on the side effects of medications. Explained the importance of ambulation. Instructed the patient not to attempt to get out of bed and/or ambulate without a staff member present. Patient verbalized understanding. Patient left in bed with call light within reach, bed in lowest position with wheels locked, and siderails x 3 up. Will continue to monitor. 2106 - Patient stated pain to be 8/10. Patient received PRN medication per the [facility]. Patient educated on the side effects of the medications. Patient encouraged to continue to actively manage pain and call for assistance. Call bell within reach. Will continue to monitor. 2220 - Patient lying in bed resting quietly, watching television. Spouse at the bedside. No needs expressed at this time. Call bell within reach.   Will continue to monitor. 0030 - Patient lying in bed resting quietly, watching television. Spouse at the bedside. No needs expressed at this time. Call bell within reach. Will continue to monitor. 80 - Patient lying in bed resting quietly, watching television. Spouse at the bedside. No needs expressed at this time. Call bell within reach. Will continue to monitor. 0330 - Patient lying in bed with eyes closed, breathing regularly and evenly. Call bell within reach. Will continue to monitor. 6130 - Patient lying in bed with eyes closed, breathing regularly and evenly. Call bell within reach. Will continue to monitor.

## 2018-07-28 NOTE — PROGRESS NOTES
1136 - Patient in bed at this time. A/O x 3. IV to left hand  intact and patent. Elastic bandage CDI, to right leg and wound vac running. Denies numbness/tingling. Pedal pulses palpable. Lungs clear. Bowel sounds present to all quadrants. Patient able to get to 2000 on the incentive spirometer. Pain 10/10, PRN Roxicodone 10 mg given for pain. 1553-10 mg Roxicodone given for 8/10 pain. Summary-Pt appears calm, no signs of distress or outward signs of pain, right leg elastic bandage remains CDI and wound vac functioning appropriately. Pedal pulses and sensation present, Will continue abx as scheduled.

## 2018-07-28 NOTE — ROUTINE PROCESS
Bedside and Verbal shift change report given to [facility] RN by Chidi Maravilla RN. Report included the following information SBAR, Kardex, OR Summary, Intake/Output and MAR.

## 2018-07-28 NOTE — PROGRESS NOTES
Problem: Falls - Risk of 
Goal: *Absence of Falls Document Mervin Bravo Fall Risk and appropriate interventions in the flowsheet. Outcome: Progressing Towards Goal 
Fall Risk Interventions: 
Mobility Interventions: Patient to call before getting OOB, PT Consult for mobility concerns, PT Consult for assist device competence, OT consult for ADLs, Utilize walker, cane, or other assistive device Mentation Interventions: Adequate sleep, hydration, pain control Medication Interventions: Patient to call before getting OOB, Teach patient to arise slowly Elimination Interventions: Call light in reach, Patient to call for help with toileting needs, Toileting schedule/hourly rounds, Urinal in reach History of Falls Interventions: Door open when patient unattended

## 2018-07-28 NOTE — PROGRESS NOTES
Problem: Mobility Impaired (Adult and Pediatric) Goal: *Acute Goals and Plan of Care (Insert Text) Physical Therapy Goals Initiated 7/27/2018 and to be accomplished within 5 day(s) 1. Patient will move from supine to sit and sit to supine  in bed with supervision/set-up. 2.  Patient will transfer from bed to chair and chair to bed with supervision/set-up using the least restrictive device. 3.  Patient will perform sit to stand with supervision/set-up. 4.  Patient will ambulate with supervision/set-up for >150 feet with the least restrictive device. 5.  Patient will ascend/descend 13 stairs with handrail(s) with minimal assistance/contact guard assist for safe home entry. Outcome: Progressing Towards Goal 
physical Therapy TREATMENT Patient: Melody Weeks (10 y.o. male) Date: 7/28/2018 Diagnosis: Wound infection POST OP INFECTION RIGHT KNEE <principal problem not specified> Procedure(s) (LRB): 
RIGHT KNEE INCISION AND DRAINAGE WITH WOUND VAC APPLICATION, PATIENT IS IN ROOM 202 (Right) 1 Day Post-Op Precautions: Fall, WBAT Chart, physical therapy assessment, plan of care and goals were reviewed. ASSESSMENT: 
Pt supine in bed, spouse not present, NAD, wound vac and IV intact, willing to participate. Pt completed bed mob with CGA for RLE management. Pt STS with CGA/SBA to initiate gait. Once amb pt demo'd step to gait that progressed to step through after VC. He still amb with slight antalgic gait. Pt able to manage RW in tight spaces and with turns. Wound vac attached to front of RW increasing difficulty of RW negotiation and gait. He demo'd increased RR/SOB towards end of session, nrs aware, unable to obtain vitals via pulse ox. Pt amb approx 300ft today. Pt denied any dizziness or lightheadedness. Pt returned to bed supine with SBA, wound vac and IV intact, call bell within reach, all needs met, nrs notified, no changes to right knee ace bandage.   
Progression toward goals: 
[x] Improving appropriately and progressing toward goals 
[]      Improving slowly and progressing toward goals 
[]      Not making progress toward goals and plan of care will be adjusted PLAN: 
Patient continues to benefit from skilled intervention to address the above impairments. Continue treatment per established plan of care. Discharge Recommendations:  Home Health Further Equipment Recommendations for Discharge:  rolling walker SUBJECTIVE:  
Patient stated I'm ready.  OBJECTIVE DATA SUMMARY:  
Critical Behavior: 
Neurologic State: Alert Orientation Level: Oriented X4 Cognition: Appropriate decision making, Appropriate for age attention/concentration, Appropriate safety awareness, Follows commands Safety/Judgement: Awareness of environment, Fall prevention, Good awareness of safety precautions, Insight into deficits Functional Mobility Training: 
Bed Mobility: 
Supine to Sit: Contact guard assistance Sit to Supine: Contact guard assistance Scooting: Stand-by assistance Transfers: 
Sit to Stand: Stand-by assistance;Contact guard assistance Stand to Sit: Stand-by assistance Balance: 
Sitting: Intact Standing: With support Ambulation/Gait Training: 
Distance (ft): 300 Feet (ft) Assistive Device: Gait belt;Walker, rolling Ambulation - Level of Assistance: Contact guard assistance;Stand-by assistance Gait Abnormalities: Antalgic Right Side Weight Bearing: As tolerated Base of Support: Shift to left Stance: Left increased Speed/Suad: Slow Stairs: 
Pain: 
Pain Scale 1: Numeric (0 - 10) Pain Intensity 1: 8 Activity Tolerance:  
good Please refer to the flowsheet for vital signs taken during this treatment. After treatment:  
[] Patient left in no apparent distress sitting up in chair 
[x] Patient left in no apparent distress in bed 
[x] Call bell left within reach [x] Nursing notified 
[] Caregiver present 
[] Bed alarm activated Sandra Grider  Time Calculation: 23 mins

## 2018-07-28 NOTE — ROUTINE PROCESS
Bedside report received from [address] [place] [address]. Patient in bed at this time. Pain 10/10. Plan of care for the day addressed with the patient.

## 2018-07-28 NOTE — ROUTINE PROCESS
Bedside and Verbal shift change report given to Eduardo Murray RN (oncoming nurse) by Elham Williamson RN (offgoing nurse). Report included the following information SBAR, Procedure Summary, Intake/Output and MAR.

## 2018-07-28 NOTE — PROGRESS NOTES
Problem: Falls - Risk of 
Goal: *Absence of Falls Document Tia Manus Fall Risk and appropriate interventions in the flowsheet. Outcome: Progressing Towards Goal 
Fall Risk Interventions: 
Mobility Interventions: Patient to call before getting OOB Mentation Interventions: Adequate sleep, hydration, pain control Medication Interventions: Patient to call before getting OOB Elimination Interventions: Call light in reach History of Falls Interventions: Door open when patient unattended

## 2018-07-28 NOTE — PROGRESS NOTES
Problem: Mobility Impaired (Adult and Pediatric) Goal: *Acute Goals and Plan of Care (Insert Text) Physical Therapy Goals Initiated 7/27/2018 and to be accomplished within 5 day(s) 1. Patient will move from supine to sit and sit to supine  in bed with supervision/set-up. 2.  Patient will transfer from bed to chair and chair to bed with supervision/set-up using the least restrictive device. 3.  Patient will perform sit to stand with supervision/set-up. 4.  Patient will ambulate with supervision/set-up for >150 feet with the least restrictive device. 5.  Patient will ascend/descend 13 stairs with handrail(s) with minimal assistance/contact guard assist for safe home entry. Outcome: Progressing Towards Goal 
physical Therapy TREATMENT Patient: Roland Otoole (36 y.o. male) Date: 7/28/2018 Diagnosis: Wound infection POST OP INFECTION RIGHT KNEE <principal problem not specified> Procedure(s) (LRB): 
RIGHT KNEE INCISION AND DRAINAGE WITH WOUND VAC APPLICATION, PATIENT IS IN ROOM 202 (Right) 1 Day Post-Op Precautions: Fall, WBAT Chart, physical therapy assessment, plan of care and goals were reviewed. ASSESSMENT: 
Pt supine in bed, significant other present, willing to participate. Wound vac on, IV intact. Pt able to perform bed mobility with CGA to go from supine to seated EOB. Pt completed STS with VC req for correct hand placement on RW with CGA. Pt amb with wound vac attached to front of RW, PT management of IV for approx 160ft with CGA. He demo'd step to gait and dec toe clearance with some improvement after VC. Pt requested to stop d/t nrs present with meds. Pt returned to supine in bed, all needs met, significant other present, nrs present, call bell within reach.   
Progression toward goals: 
[x]      Improving appropriately and progressing toward goals 
[]      Improving slowly and progressing toward goals 
[]      Not making progress toward goals and plan of care will be adjusted PLAN: 
Patient continues to benefit from skilled intervention to address the above impairments. Continue treatment per established plan of care. Discharge Recommendations:  Home Health Further Equipment Recommendations for Discharge:  rolling walker SUBJECTIVE:  
Patient stated My pain is 8/10.  OBJECTIVE DATA SUMMARY:  
Critical Behavior: 
Neurologic State: Alert Orientation Level: Oriented X4 Cognition: Appropriate decision making, Appropriate for age attention/concentration, Appropriate safety awareness, Follows commands Safety/Judgement: Awareness of environment, Fall prevention, Good awareness of safety precautions, Insight into deficits Functional Mobility Training: 
Bed Mobility: 
Supine to Sit: Contact guard assistance Sit to Supine: Contact guard assistance Scooting: Contact guard assistance Transfers: 
Sit to Stand: Contact guard assistance;Stand-by assistance Stand to Sit: Stand-by assistance Balance: 
Sitting: With support Standing: With support Ambulation/Gait Training: 
Distance (ft): 160 Feet (ft) Assistive Device: Walker, rolling;Gait belt Ambulation - Level of Assistance: Contact guard assistance Gait Abnormalities: Step to gait; Decreased step clearance Right Side Weight Bearing: As tolerated Base of Support: Shift to left Speed/Suad: Slow;Shuffled Stairs: 
Pain: 
Pain Scale 1: Numeric (0 - 10) Pain Intensity 1: 8 Pain Location 1: Knee Pain Orientation 1: Right Pain Description 1: Aching;Burning Pain Intervention(s) 1: Medication (see MAR) Activity Tolerance:  
good Please refer to the flowsheet for vital signs taken during this treatment. After treatment:  
[x] Patient left in no apparent distress sitting up in chair 
[] Patient left in no apparent distress in bed 
[x] Call bell left within reach [x] Nursing notified 
[x] significant other and nrs present 
[] Bed alarm activated Michelle Booth Time Calculation: 15 mins

## 2018-07-28 NOTE — ROUTINE PROCESS
Bedside and Verbal shift change report given to WILLOW Mayorga RN (oncoming nurse) by Sarah Rx (offgoing nurse). Report included the following information SBAR, Kardex, Intake/Output and MAR.

## 2018-07-28 NOTE — PROGRESS NOTES
Progress Note Patient: Maddie Schuler               Sex: male          DOA: 7/25/2018 YOB: 1960      Age:  63 y.o.        LOS:  LOS: 2 days Procedure: Procedure(s): RIGHT KNEE INCISION AND DRAINAGE WITH WOUND VAC APPLICATION, PATIENT IS IN ROOM 202 Subjective:  
 
Maddie Schuler is a 63 y.o. male who c/o stable, mild-to-moderate joint symptoms intermittently, reasonably well controlled by PRN meds Objective:  
  
Visit Vitals  /87  Pulse 85  Temp 98.4 °F (36.9 °C)  Resp 16  
 Ht 5' 11\" (1.803 m)  Wt 89.8 kg (198 lb)  SpO2 99%  BMI 27.62 kg/m2 Physical Exam: 
A&O x3 
VSS 
HF/GS/QUADS/EHL/TA 5/5 bilateral 
Calves nontender Dressing: C/D/I Intake and Output: 
Current Shift:  07/28 0701 - 07/28 1900 In: 9644 [P.O.:240; I.V.:1038] Out: 1000 [Urine:1000] Last three shifts:  07/26 1901 - 07/28 0700 In: 3148 [P.O.:1050; I.V.:2098] Out: 1700 [Urine:1700] Drain Output: 
 
Lab/Data Reviewed: All lab results for the last 24 hours reviewed. Medications Reviewed Continued hospitalization is indicated due to therapy needs Assessment/Plan Active Problems: 
  Wound infection (7/26/2018) Gram + and gram - on cultures. Will discuss with Dr. Erika Sweet who did not feel that ID consult was necessary. Home

## 2018-07-29 LAB
ANION GAP SERPL CALC-SCNC: 4 MMOL/L (ref 3–18)
BACTERIA SPEC CULT: ABNORMAL
BASOPHILS # BLD: 0 K/UL (ref 0–0.1)
BASOPHILS NFR BLD: 0 % (ref 0–2)
BUN SERPL-MCNC: 12 MG/DL (ref 7–18)
BUN/CREAT SERPL: 10 (ref 12–20)
CALCIUM SERPL-MCNC: 8.5 MG/DL (ref 8.5–10.1)
CHLORIDE SERPL-SCNC: 109 MMOL/L (ref 100–108)
CO2 SERPL-SCNC: 30 MMOL/L (ref 21–32)
CREAT SERPL-MCNC: 1.25 MG/DL (ref 0.6–1.3)
DIFFERENTIAL METHOD BLD: ABNORMAL
EOSINOPHIL # BLD: 0.3 K/UL (ref 0–0.4)
EOSINOPHIL NFR BLD: 3 % (ref 0–5)
ERYTHROCYTE [DISTWIDTH] IN BLOOD BY AUTOMATED COUNT: 18.8 % (ref 11.6–14.5)
GLUCOSE BLD STRIP.AUTO-MCNC: 131 MG/DL (ref 70–110)
GLUCOSE BLD STRIP.AUTO-MCNC: 166 MG/DL (ref 70–110)
GLUCOSE BLD STRIP.AUTO-MCNC: 177 MG/DL (ref 70–110)
GLUCOSE BLD STRIP.AUTO-MCNC: 187 MG/DL (ref 70–110)
GLUCOSE SERPL-MCNC: 143 MG/DL (ref 74–99)
GRAM STN SPEC: ABNORMAL
HCT VFR BLD AUTO: 29.9 % (ref 36–48)
HGB BLD-MCNC: 9.1 G/DL (ref 13–16)
LYMPHOCYTES # BLD: 2.7 K/UL (ref 0.9–3.6)
LYMPHOCYTES NFR BLD: 33 % (ref 21–52)
MCH RBC QN AUTO: 23.8 PG (ref 24–34)
MCHC RBC AUTO-ENTMCNC: 30.4 G/DL (ref 31–37)
MCV RBC AUTO: 78.1 FL (ref 74–97)
MONOCYTES # BLD: 0.6 K/UL (ref 0.05–1.2)
MONOCYTES NFR BLD: 7 % (ref 3–10)
NEUTS SEG # BLD: 4.8 K/UL (ref 1.8–8)
NEUTS SEG NFR BLD: 57 % (ref 40–73)
PLATELET # BLD AUTO: 202 K/UL (ref 135–420)
PMV BLD AUTO: 9.5 FL (ref 9.2–11.8)
POTASSIUM SERPL-SCNC: 4 MMOL/L (ref 3.5–5.5)
RBC # BLD AUTO: 3.83 M/UL (ref 4.7–5.5)
SERVICE CMNT-IMP: ABNORMAL
SODIUM SERPL-SCNC: 143 MMOL/L (ref 136–145)
WBC # BLD AUTO: 8.3 K/UL (ref 4.6–13.2)

## 2018-07-29 PROCEDURE — 97530 THERAPEUTIC ACTIVITIES: CPT

## 2018-07-29 PROCEDURE — 85025 COMPLETE CBC W/AUTO DIFF WBC: CPT | Performed by: ORTHOPAEDIC SURGERY

## 2018-07-29 PROCEDURE — 82962 GLUCOSE BLOOD TEST: CPT

## 2018-07-29 PROCEDURE — 74011250637 HC RX REV CODE- 250/637: Performed by: ORTHOPAEDIC SURGERY

## 2018-07-29 PROCEDURE — 97116 GAIT TRAINING THERAPY: CPT

## 2018-07-29 PROCEDURE — 74011636637 HC RX REV CODE- 636/637: Performed by: INTERNAL MEDICINE

## 2018-07-29 PROCEDURE — 65270000029 HC RM PRIVATE

## 2018-07-29 PROCEDURE — 74011250636 HC RX REV CODE- 250/636: Performed by: HOSPITALIST

## 2018-07-29 PROCEDURE — 80048 BASIC METABOLIC PNL TOTAL CA: CPT | Performed by: ORTHOPAEDIC SURGERY

## 2018-07-29 PROCEDURE — 36415 COLL VENOUS BLD VENIPUNCTURE: CPT | Performed by: ORTHOPAEDIC SURGERY

## 2018-07-29 PROCEDURE — 74011250636 HC RX REV CODE- 250/636: Performed by: ORTHOPAEDIC SURGERY

## 2018-07-29 PROCEDURE — 74011000258 HC RX REV CODE- 258: Performed by: INTERNAL MEDICINE

## 2018-07-29 PROCEDURE — 74011250636 HC RX REV CODE- 250/636: Performed by: INTERNAL MEDICINE

## 2018-07-29 RX ORDER — OXYCODONE HYDROCHLORIDE 5 MG/1
5-10 TABLET ORAL
Status: DISCONTINUED | OUTPATIENT
Start: 2018-07-29 | End: 2018-07-30 | Stop reason: HOSPADM

## 2018-07-29 RX ADMIN — QUETIAPINE FUMARATE 300 MG: 100 TABLET ORAL at 11:09

## 2018-07-29 RX ADMIN — SODIUM CHLORIDE 100 ML/HR: 900 INJECTION, SOLUTION INTRAVENOUS at 09:00

## 2018-07-29 RX ADMIN — ACETAMINOPHEN 650 MG: 325 TABLET, FILM COATED ORAL at 11:38

## 2018-07-29 RX ADMIN — PIPERACILLIN SODIUM,TAZOBACTAM SODIUM 3.38 G: 3; .375 INJECTION, POWDER, FOR SOLUTION INTRAVENOUS at 00:45

## 2018-07-29 RX ADMIN — QUETIAPINE FUMARATE 300 MG: 100 TABLET ORAL at 20:19

## 2018-07-29 RX ADMIN — RANITIDINE 300 MG: 150 TABLET ORAL at 11:09

## 2018-07-29 RX ADMIN — HYDROMORPHONE HYDROCHLORIDE 2 MG: 2 INJECTION, SOLUTION INTRAMUSCULAR; INTRAVENOUS; SUBCUTANEOUS at 05:08

## 2018-07-29 RX ADMIN — ACETAMINOPHEN 650 MG: 325 TABLET, FILM COATED ORAL at 23:19

## 2018-07-29 RX ADMIN — PIPERACILLIN SODIUM,TAZOBACTAM SODIUM 3.38 G: 3; .375 INJECTION, POWDER, FOR SOLUTION INTRAVENOUS at 17:37

## 2018-07-29 RX ADMIN — VANCOMYCIN HYDROCHLORIDE 1250 MG: 10 INJECTION, POWDER, LYOPHILIZED, FOR SOLUTION INTRAVENOUS at 19:43

## 2018-07-29 RX ADMIN — TRAMADOL HYDROCHLORIDE 50 MG: 50 TABLET, FILM COATED ORAL at 23:19

## 2018-07-29 RX ADMIN — RANITIDINE 300 MG: 150 TABLET ORAL at 20:19

## 2018-07-29 RX ADMIN — OXYCODONE HYDROCHLORIDE 10 MG: 5 TABLET ORAL at 23:19

## 2018-07-29 RX ADMIN — DOCUSATE SODIUM 100 MG: 100 CAPSULE, LIQUID FILLED ORAL at 11:09

## 2018-07-29 RX ADMIN — OXYCODONE HYDROCHLORIDE 5 MG: 5 TABLET ORAL at 19:00

## 2018-07-29 RX ADMIN — INSULIN LISPRO 2 UNITS: 100 INJECTION, SOLUTION INTRAVENOUS; SUBCUTANEOUS at 23:18

## 2018-07-29 RX ADMIN — INSULIN LISPRO 2 UNITS: 100 INJECTION, SOLUTION INTRAVENOUS; SUBCUTANEOUS at 18:53

## 2018-07-29 RX ADMIN — TRAMADOL HYDROCHLORIDE 100 MG: 50 TABLET, FILM COATED ORAL at 17:37

## 2018-07-29 RX ADMIN — TRAMADOL HYDROCHLORIDE 100 MG: 50 TABLET, FILM COATED ORAL at 05:49

## 2018-07-29 RX ADMIN — TRAMADOL HYDROCHLORIDE 100 MG: 50 TABLET, FILM COATED ORAL at 00:44

## 2018-07-29 RX ADMIN — PIPERACILLIN SODIUM,TAZOBACTAM SODIUM 3.38 G: 3; .375 INJECTION, POWDER, FOR SOLUTION INTRAVENOUS at 22:39

## 2018-07-29 RX ADMIN — INSULIN LISPRO 2 UNITS: 100 INJECTION, SOLUTION INTRAVENOUS; SUBCUTANEOUS at 12:43

## 2018-07-29 RX ADMIN — ACETAMINOPHEN 650 MG: 325 TABLET, FILM COATED ORAL at 00:44

## 2018-07-29 RX ADMIN — TRAMADOL HYDROCHLORIDE 100 MG: 50 TABLET, FILM COATED ORAL at 11:38

## 2018-07-29 RX ADMIN — SODIUM CHLORIDE 100 ML/HR: 900 INJECTION, SOLUTION INTRAVENOUS at 22:39

## 2018-07-29 RX ADMIN — CALCIUM POLYCARBOPHIL 625 MG: 625 TABLET, FILM COATED ORAL at 11:38

## 2018-07-29 RX ADMIN — INSULIN LISPRO 2 UNITS: 100 INJECTION, SOLUTION INTRAVENOUS; SUBCUTANEOUS at 00:45

## 2018-07-29 RX ADMIN — PIPERACILLIN SODIUM,TAZOBACTAM SODIUM 3.38 G: 3; .375 INJECTION, POWDER, FOR SOLUTION INTRAVENOUS at 09:00

## 2018-07-29 RX ADMIN — ACETAMINOPHEN 650 MG: 325 TABLET, FILM COATED ORAL at 05:49

## 2018-07-29 RX ADMIN — ACETAMINOPHEN 650 MG: 325 TABLET, FILM COATED ORAL at 17:37

## 2018-07-29 RX ADMIN — ATORVASTATIN CALCIUM 20 MG: 20 TABLET, FILM COATED ORAL at 20:20

## 2018-07-29 RX ADMIN — VANCOMYCIN HYDROCHLORIDE 1250 MG: 10 INJECTION, POWDER, LYOPHILIZED, FOR SOLUTION INTRAVENOUS at 05:48

## 2018-07-29 NOTE — PROGRESS NOTES
Problem: Mobility Impaired (Adult and Pediatric) Goal: *Acute Goals and Plan of Care (Insert Text) Physical Therapy Goals Initiated 7/27/2018 and to be accomplished within 5 day(s) 1. Patient will move from supine to sit and sit to supine  in bed with supervision/set-up. 2.  Patient will transfer from bed to chair and chair to bed with supervision/set-up using the least restrictive device. 3.  Patient will perform sit to stand with supervision/set-up. 4.  Patient will ambulate with supervision/set-up for >150 feet with the least restrictive device. 5.  Patient will ascend/descend 13 stairs with handrail(s) with minimal assistance/contact guard assist for safe home entry. Outcome: Progressing Towards Goal 
physical Therapy TREATMENT Patient: Paul Corral (89 y.o. male) Date: 7/29/2018 Diagnosis: Wound infection POST OP INFECTION RIGHT KNEE <principal problem not specified> Procedure(s) (LRB): 
RIGHT KNEE INCISION AND DRAINAGE WITH WOUND VAC APPLICATION, PATIENT IS IN ROOM 202 (Right) 2 Days Post-Op Precautions: Fall, WBAT Chart, physical therapy assessment, plan of care and goals were reviewed. ASSESSMENT: 
Pt supine in bed, wife present, willing to participate, NAD. Pt completed bed mobility supine to sitting EOB with CGA. IV detached by nrs, wound vac still intact. Pt amb with RW with SBA for approx 100ft. Pt then progressed to amb with SPC, attempting to use with RUE instead of instructed LUE d/t \"feeling more comfortable. \" After pt refused to use SPC with LUE, no AD was used, just GB applied. Pt then amb approx 50ft with CGA, antalgic and step to gait. He demo'd increased breathing during, req to return to room to rest. Wound vac was held by PT during amb. Pt returned to supine in bed with SBA. All needs met, NAD, pt willing to attempt stairs next session, wife present, call bell within reach.   
Progression toward goals: 
[x]      Improving appropriately and progressing toward goals 
[]      Improving slowly and progressing toward goals 
[]      Not making progress toward goals and plan of care will be adjusted PLAN: 
Patient continues to benefit from skilled intervention to address the above impairments. Continue treatment per established plan of care. Discharge Recommendations:  Rehab vs Home Health pending pt neg stairs this afternoon Further Equipment Recommendations for Discharge:  TBD SUBJECTIVE:  
Patient stated I need to get up .  OBJECTIVE DATA SUMMARY:  
Critical Behavior: 
Neurologic State: Alert Orientation Level: Oriented X4 Cognition: Appropriate decision making, Appropriate safety awareness, Appropriate for age attention/concentration, Follows commands Safety/Judgement: Awareness of environment, Fall prevention, Good awareness of safety precautions, Insight into deficits Functional Mobility Training: 
Bed Mobility: 
  
Supine to Sit: Contact guard assistance Sit to Supine: Stand-by assistance Scooting: Stand-by assistance Transfers: 
Sit to Stand: Contact guard assistance Stand to Sit: Stand-by assistance Balance: 
Standing: With support Ambulation/Gait Training: 
Distance (ft): 200 Feet (ft) Assistive Device: Gait belt;Cane, straight;Walker, rolling (and no AD with just GB applied ) Ambulation - Level of Assistance: Contact guard assistance Gait Abnormalities: Antalgic; Step to gait; Decreased step clearance Right Side Weight Bearing: As tolerated Base of Support: Shift to left Speed/Suad: Slow Pain: 
Pain Scale 1: Numeric (0 - 10) Pain Intensity 1: 8 Pain Location 1: Knee Pain Orientation 1: Right Pain Description 1: Aching Pain Intervention(s) 1: Medication (see MAR) Activity Tolerance:  
Fair+ Please refer to the flowsheet for vital signs taken during this treatment. After treatment:  
[] Patient left in no apparent distress sitting up in chair 
[x] Patient left in no apparent distress in bed 
[x] Call bell left within reach [x] Nursing notified 
[x] Wife present 
[] Bed alarm activated Ramírez Richards Time Calculation: 23 mins

## 2018-07-29 NOTE — PROGRESS NOTES
Patient complains of increased edema to BLE, patients legs assessed and +1 BLE edema noted. Dr. Rachel Huffman paged; Dr. Rachel Huffman made aware of patients concerns and BLE edema, physician said edema is expected and to notify him is patient complains of pain in legs.

## 2018-07-29 NOTE — PROGRESS NOTES
2000 - Received report from Donna Alexander RN(offgoing nurse). Assumed care of PT. PT assessed. PT oriented to room & given instructions regarding call bell, incentive spirometer, room phone, medications, and pain medications with potential side effects. PT encouraged to use call bell. Phone & call bell left in reach of PT. Woundvac present on right knee. 2132 - Patient pain level 8/10. Patient requested pain medication. Roxicodone 10mg administered. Will continue to monitor patient. 4491 - Patient pain level 9/10. Patient requested pain medication. Dilaudid 2mg IV administered. Will continue to monitor patient. 0720 -  Bedside and Verbal shift change report given to RADHA Huff (oncoming nurse) by Margie Moreno RN (offgoing nurse). Report included the following information SBAR, Kardex, Procedure Summary, Intake/Output, MAR, Accordion, Recent Results and Med Rec Status.

## 2018-07-29 NOTE — PROGRESS NOTES
Problem: Mobility Impaired (Adult and Pediatric) Goal: *Acute Goals and Plan of Care (Insert Text) Physical Therapy Goals Initiated 7/27/2018 and to be accomplished within 5 day(s) 1. Patient will move from supine to sit and sit to supine  in bed with supervision/set-up. 2.  Patient will transfer from bed to chair and chair to bed with supervision/set-up using the least restrictive device. 3.  Patient will perform sit to stand with supervision/set-up. 4.  Patient will ambulate with supervision/set-up for >150 feet with the least restrictive device. 5.  Patient will ascend/descend 13 stairs with handrail(s) with minimal assistance/contact guard assist for safe home entry. Outcome: Progressing Towards Goal 
physical Therapy TREATMENT Patient: Maddi Hopkins (19 y.o. male) Date: 7/29/2018 Diagnosis: Wound infection POST OP INFECTION RIGHT KNEE <principal problem not specified> Procedure(s) (LRB): 
RIGHT KNEE INCISION AND DRAINAGE WITH WOUND VAC APPLICATION, PATIENT IS IN ROOM 202 (Right) 2 Days Post-Op Precautions: Fall, WBAT Chart, physical therapy assessment, plan of care and goals were reviewed. ASSESSMENT: 
Pt supine in bed, wife present, willing to participate, wound vac intact, NAD. Pt completed bed mobility with supine to sitting EOB with SBA. Pt req increased time to maintain balance once standing. He amb with SPC approx 50ft total with CGA. He req max VC for correct SPC sequencing and placement, refusing/stating increased comfort with SPC on RUE. Pt able to cont amb to stairs and asc/desc 10 steps with CGA and correct SPC and HR use after VC and TC. He returned to bed with CGA/SBA, supine, all needs met, NAD, wound vac intact, nrs aware, nrs stating she will connect IV, call bell within reach. Pt still demo's deficits but is progressing nicely. He would benefit from decreased PT from BID to Q/BID d/t gains made and goals attained, but deficits still present.   
Progression toward goals: 
[x]      Improving appropriately and progressing toward goals 
[]      Improving slowly and progressing toward goals 
[]      Not making progress toward goals and plan of care will be adjusted PLAN: 
Patient continues to benefit from skilled intervention to address the above impairments. Continue treatment per established plan of care. Discharge Recommendations:  Home Health Further Equipment Recommendations for Discharge:  straight cane and rolling walker SUBJECTIVE:  
Patient stated I'm tired.  OBJECTIVE DATA SUMMARY:  
Critical Behavior: 
Neurologic State: Alert Orientation Level: Oriented X4 Cognition: Appropriate decision making, Appropriate safety awareness, Appropriate for age attention/concentration, Follows commands Safety/Judgement: Awareness of environment, Fall prevention, Good awareness of safety precautions, Insight into deficits Functional Mobility Training: 
Bed Mobility: 
Supine to Sit: Stand-by assistance Sit to Supine: Stand-by assistance Scooting: Stand-by assistance Transfers: 
Sit to Stand: Stand-by assistance;Contact guard assistance Stand to Sit: Stand-by assistance Balance: 
Standing: With support Ambulation/Gait Training: 
Distance (ft): 50 Feet (ft) Assistive Device: Cane, straight;Gait belt Ambulation - Level of Assistance: Stand-by assistance;Contact guard assistance Gait Abnormalities: Antalgic;Decreased step clearance Right Side Weight Bearing: As tolerated Base of Support: Shift to left; Widened Stance: Left increased Speed/Suad: Slow Step Length: Left shortened Stairs: 
Number of Stairs Trained: 20 Stairs - Level of Assistance: Contact guard assistance Rail Use: Left Pain: 
Pain Scale 1: Numeric (0 - 10) Pain Intensity 1: 8 Pain Location 1: Knee Pain Orientation 1: Right Pain Description 1: Aching Pain Intervention(s) 1: Medication (see MAR) Activity Tolerance:  
good Please refer to the flowsheet for vital signs taken during this treatment. After treatment:  
[] Patient left in no apparent distress sitting up in chair 
[x] Patient left in no apparent distress in bed 
[x] Call bell left within reach [x] Nursing notified 
[] Caregiver present 
[] Bed alarm activated Katlyn Harrison Time Calculation: 23 mins

## 2018-07-29 NOTE — PROGRESS NOTES
Progress Note Patient: Aline White               Sex: male          DOA: 7/25/2018 YOB: 1960      Age:  63 y.o.        LOS:  LOS: 3 days Procedure: Procedure(s): RIGHT KNEE INCISION AND DRAINAGE WITH WOUND VAC APPLICATION, PATIENT IS IN ROOM 202 Subjective:  
 
Aline White is a 63 y.o. male who c/o stable, mild-to-moderate joint symptoms intermittently, reasonably well controlled by PRN meds Objective:  
  
Visit Vitals  /71 (BP 1 Location: Left arm, BP Patient Position: At rest)  Pulse 83  Temp 98.7 °F (37.1 °C)  Resp 18  Ht 5' 11\" (1.803 m)  Wt 89.8 kg (198 lb)  SpO2 99%  BMI 27.62 kg/m2 Physical Exam: 
A&O x3 
VSS 
HF/GS/QUADS/EHL/TA 5/5 bilateral 
Calves nontender but ACE wrap in place with suction. ROM of ankle at bedside Dressing: C/D/I Intake and Output: 
Current Shift:    
Last three shifts:  07/27 1901 - 07/29 0700 In: 4663.9 [P.O.:2010; I.V.:2653.9] Out: 5727 [JSACR:6434] Drain Output: 
 
Lab/Data Reviewed: All lab results for the last 24 hours reviewed. Medications Reviewed Continued hospitalization is indicated due to therapy and possibly Infectious Disease Consultation Assessment/Plan Active Problems: 
  Wound infection (7/26/2018) Dr. Maribell Aldana to eval tomorrow and discuss need for ID consultation. Continue current care. Home

## 2018-07-30 ENCOUNTER — APPOINTMENT (OUTPATIENT)
Dept: VASCULAR SURGERY | Age: 58
DRG: 863 | End: 2018-07-30
Attending: HOSPITALIST
Payer: MEDICARE

## 2018-07-30 VITALS
TEMPERATURE: 98.7 F | BODY MASS INDEX: 27.72 KG/M2 | HEIGHT: 71 IN | OXYGEN SATURATION: 100 % | RESPIRATION RATE: 20 BRPM | DIASTOLIC BLOOD PRESSURE: 94 MMHG | WEIGHT: 198 LBS | SYSTOLIC BLOOD PRESSURE: 147 MMHG | HEART RATE: 76 BPM

## 2018-07-30 PROBLEM — T81.49XA SUPERFICIAL POSTOPERATIVE WOUND INFECTION: Chronic | Status: ACTIVE | Noted: 2018-07-26

## 2018-07-30 PROBLEM — T81.49XA SUPERFICIAL POSTOPERATIVE WOUND INFECTION: Chronic | Status: RESOLVED | Noted: 2018-07-26 | Resolved: 2018-07-30

## 2018-07-30 PROBLEM — E11.9 DIABETES MELLITUS TYPE 2, CONTROLLED (HCC): Status: ACTIVE | Noted: 2018-07-30

## 2018-07-30 PROBLEM — I10 HYPERTENSION: Status: ACTIVE | Noted: 2018-07-30

## 2018-07-30 PROBLEM — F99 PSYCHIATRIC DISORDER: Status: ACTIVE | Noted: 2018-07-30

## 2018-07-30 PROBLEM — I25.10 CAD (CORONARY ARTERY DISEASE): Status: ACTIVE | Noted: 2018-07-30

## 2018-07-30 LAB
BASOPHILS # BLD: 0 K/UL (ref 0–0.1)
BASOPHILS NFR BLD: 0 % (ref 0–2)
CREAT SERPL-MCNC: 1.15 MG/DL (ref 0.6–1.3)
DIFFERENTIAL METHOD BLD: ABNORMAL
EOSINOPHIL # BLD: 0.3 K/UL (ref 0–0.4)
EOSINOPHIL NFR BLD: 4 % (ref 0–5)
ERYTHROCYTE [DISTWIDTH] IN BLOOD BY AUTOMATED COUNT: 19.2 % (ref 11.6–14.5)
GLUCOSE BLD STRIP.AUTO-MCNC: 133 MG/DL (ref 70–110)
HCT VFR BLD AUTO: 30.1 % (ref 36–48)
HGB BLD-MCNC: 9.1 G/DL (ref 13–16)
LYMPHOCYTES # BLD: 2.6 K/UL (ref 0.9–3.6)
LYMPHOCYTES NFR BLD: 37 % (ref 21–52)
MCH RBC QN AUTO: 23.6 PG (ref 24–34)
MCHC RBC AUTO-ENTMCNC: 30.2 G/DL (ref 31–37)
MCV RBC AUTO: 78.2 FL (ref 74–97)
MONOCYTES # BLD: 0.5 K/UL (ref 0.05–1.2)
MONOCYTES NFR BLD: 8 % (ref 3–10)
NEUTS SEG # BLD: 3.7 K/UL (ref 1.8–8)
NEUTS SEG NFR BLD: 51 % (ref 40–73)
PLATELET # BLD AUTO: 233 K/UL (ref 135–420)
PMV BLD AUTO: 10.5 FL (ref 9.2–11.8)
RBC # BLD AUTO: 3.85 M/UL (ref 4.7–5.5)
WBC # BLD AUTO: 7.1 K/UL (ref 4.6–13.2)

## 2018-07-30 PROCEDURE — 82565 ASSAY OF CREATININE: CPT | Performed by: ORTHOPAEDIC SURGERY

## 2018-07-30 PROCEDURE — 36415 COLL VENOUS BLD VENIPUNCTURE: CPT | Performed by: ORTHOPAEDIC SURGERY

## 2018-07-30 PROCEDURE — 74011250636 HC RX REV CODE- 250/636: Performed by: INTERNAL MEDICINE

## 2018-07-30 PROCEDURE — 74011250637 HC RX REV CODE- 250/637: Performed by: ORTHOPAEDIC SURGERY

## 2018-07-30 PROCEDURE — 74011000258 HC RX REV CODE- 258: Performed by: INTERNAL MEDICINE

## 2018-07-30 PROCEDURE — 82962 GLUCOSE BLOOD TEST: CPT

## 2018-07-30 PROCEDURE — 93970 EXTREMITY STUDY: CPT

## 2018-07-30 PROCEDURE — 85025 COMPLETE CBC W/AUTO DIFF WBC: CPT | Performed by: ORTHOPAEDIC SURGERY

## 2018-07-30 PROCEDURE — 74011250636 HC RX REV CODE- 250/636: Performed by: HOSPITALIST

## 2018-07-30 RX ORDER — CLINDAMYCIN HYDROCHLORIDE 150 MG/1
150 CAPSULE ORAL EVERY 6 HOURS
Qty: 40 CAP | Refills: 0 | Status: SHIPPED | OUTPATIENT
Start: 2018-07-30 | End: 2018-08-09

## 2018-07-30 RX ORDER — ACETAMINOPHEN 325 MG/1
650 TABLET ORAL
Qty: 120 TAB | Refills: 0 | Status: SHIPPED | OUTPATIENT
Start: 2018-07-30 | End: 2019-01-26

## 2018-07-30 RX ORDER — ENOXAPARIN SODIUM 100 MG/ML
40 INJECTION SUBCUTANEOUS EVERY 24 HOURS
Status: DISCONTINUED | OUTPATIENT
Start: 2018-07-30 | End: 2018-07-30 | Stop reason: HOSPADM

## 2018-07-30 RX ORDER — OXYCODONE HYDROCHLORIDE 5 MG/1
5 TABLET ORAL
Qty: 40 TAB | Refills: 0 | Status: SHIPPED | OUTPATIENT
Start: 2018-07-30 | End: 2019-01-26

## 2018-07-30 RX ORDER — TRAMADOL HYDROCHLORIDE 50 MG/1
100 TABLET ORAL EVERY 6 HOURS
Qty: 60 TAB | Refills: 0 | Status: SHIPPED | OUTPATIENT
Start: 2018-07-30 | End: 2019-01-26

## 2018-07-30 RX ORDER — ACETAMINOPHEN 325 MG/1
650 TABLET ORAL
Status: DISCONTINUED | OUTPATIENT
Start: 2018-07-30 | End: 2018-07-30 | Stop reason: HOSPADM

## 2018-07-30 RX ORDER — FUROSEMIDE 10 MG/ML
40 INJECTION INTRAMUSCULAR; INTRAVENOUS ONCE
Status: COMPLETED | OUTPATIENT
Start: 2018-07-30 | End: 2018-07-30

## 2018-07-30 RX ADMIN — OXYCODONE HYDROCHLORIDE 10 MG: 5 TABLET ORAL at 04:08

## 2018-07-30 RX ADMIN — ACETAMINOPHEN 650 MG: 325 TABLET, FILM COATED ORAL at 05:22

## 2018-07-30 RX ADMIN — QUETIAPINE FUMARATE 300 MG: 100 TABLET ORAL at 10:05

## 2018-07-30 RX ADMIN — RANITIDINE 300 MG: 150 TABLET ORAL at 10:05

## 2018-07-30 RX ADMIN — FUROSEMIDE 40 MG: 10 INJECTION, SOLUTION INTRAMUSCULAR; INTRAVENOUS at 10:06

## 2018-07-30 RX ADMIN — ENOXAPARIN SODIUM 40 MG: 40 INJECTION SUBCUTANEOUS at 10:05

## 2018-07-30 RX ADMIN — PIPERACILLIN SODIUM,TAZOBACTAM SODIUM 3.38 G: 3; .375 INJECTION, POWDER, FOR SOLUTION INTRAVENOUS at 06:45

## 2018-07-30 RX ADMIN — CALCIUM POLYCARBOPHIL 625 MG: 625 TABLET, FILM COATED ORAL at 10:11

## 2018-07-30 RX ADMIN — DOCUSATE SODIUM 100 MG: 100 CAPSULE, LIQUID FILLED ORAL at 10:05

## 2018-07-30 RX ADMIN — VANCOMYCIN HYDROCHLORIDE 1250 MG: 10 INJECTION, POWDER, LYOPHILIZED, FOR SOLUTION INTRAVENOUS at 04:08

## 2018-07-30 RX ADMIN — OXYCODONE HYDROCHLORIDE 10 MG: 5 TABLET ORAL at 10:11

## 2018-07-30 RX ADMIN — TRAMADOL HYDROCHLORIDE 100 MG: 50 TABLET, FILM COATED ORAL at 05:22

## 2018-07-30 NOTE — DISCHARGE SUMMARY
Discharge Summary    Patient: Aline White               Sex: male          DOA: 2018         YOB: 1960      Age:  63 y.o.        LOS:  LOS: 4 days                Admit Date: 2018    Discharge Date: 2018    Admission Diagnoses: Wound infection  POST OP INFECTION RIGHT KNEE    Discharge Diagnoses:    Problem List as of 2018  Date Reviewed: 2018          Codes Class Noted - Resolved    Diabetes mellitus type 2, controlled ([facility] ICD-10-CM: E11.9  ICD-9-CM: 250.00  2018 - Present        CAD (coronary artery disease) ICD-10-CM: I25.10  ICD-9-CM: 414.00  2018 - Present        Hypertension ICD-10-CM: I10  ICD-9-CM: 401.9  2018 - Present        Psychiatric disorder ICD-10-CM: F99  ICD-9-CM: 300.9  2018 - Present        * (Principal)RESOLVED: Superficial postoperative wound infection (Chronic) ICD-10-CM: T81. 4XXA  ICD-9-CM: 998.59  2018 - 2018        RESOLVED: Skin bulla ICD-10-CM: R23.8  ICD-9-CM: 709.8  2018 - 7/3/2018        RESOLVED: Post-operative complication -17-BR: Q58. 9XXA  ICD-9-CM: 998.9  2018 - 7/3/2018        RESOLVED: DJD (degenerative joint disease) of knee ICD-10-CM: M17.10  ICD-9-CM: 715.36  2018 - 2018              Discharge Condition: Good    Hospital Course: wound vac, debridement    Consults: Hospitalist    Significant Diagnostic Studies: nrl    Discharge Medications:     Current Discharge Medication List      START taking these medications    Details   !! oxyCODONE IR (ROXICODONE) 5 mg immediate release tablet Take 1 Tab by mouth every four (4) hours as needed. Max Daily Amount: 30 mg.  Qty: 40 Tab, Refills: 0    Associated Diagnoses: Wound infection; Superficial postoperative wound infection, initial encounter; Psychiatric disorder      !! traMADol (ULTRAM) 50 mg tablet Take 2 Tabs by mouth every six (6) hours. Max Daily Amount: 400 mg.   Qty: 60 Tab, Refills: 0    Associated Diagnoses: Wound infection; Superficial postoperative wound infection, initial encounter; Psychiatric disorder      clindamycin (CLEOCIN) 150 mg capsule Take 1 Cap by mouth every six (6) hours for 10 days. Indications: Skin and Skin Structure Infection  Qty: 40 Cap, Refills: 0    Associated Diagnoses: Wound infection; Superficial postoperative wound infection, initial encounter; Psychiatric disorder       !! - Potential duplicate medications found. Please discuss with provider. CONTINUE these medications which have CHANGED    Details   acetaminophen (TYLENOL) 325 mg tablet Take 2 Tabs by mouth every six (6) hours as needed. Qty: 120 Tab, Refills: 0    Associated Diagnoses: Wound infection; Superficial postoperative wound infection, initial encounter; Psychiatric disorder         CONTINUE these medications which have NOT CHANGED    Details   sertraline (ZOLOFT) 100 mg tablet Take 100 mg by mouth two (2) times a day. docusate sodium (COLACE) 100 mg capsule Take 100 mg by mouth four (4) times daily as needed for Constipation. QUEtiapine (SEROQUEL) 300 mg tablet Take 300 mg by mouth two (2) times a day. Indications: DEPRESSION TREATMENT ADJUNCT      !! oxyCODONE IR (ROXICODONE) 5 mg immediate release tablet Take 2 Tabs by mouth every four (4) hours as needed. Max Daily Amount: 60 mg.  Qty: 60 Tab, Refills: 0    Associated Diagnoses: Primary osteoarthritis of both knees      !! traMADol (ULTRAM) 50 mg tablet Take 2 Tabs by mouth four (4) times daily. Max Daily Amount: 400 mg. Qty: 60 Tab, Refills: 0    Associated Diagnoses: Primary osteoarthritis of both knees      clonazePAM (KLONOPIN) 0.5 mg tablet Take 0.5 mg by mouth two (2) times daily as needed. calcium polycarbophil (FIBER-TABS) 625 mg tablet Take 625 mg by mouth daily. clopidogrel (PLAVIX) 75 mg tab Take 75 mg by mouth. atorvastatin (LIPITOR) 20 mg tablet Take 20 mg by mouth nightly. lisinopril (PRINIVIL, ZESTRIL) 5 mg tablet Take 5 mg by mouth daily. nitroglycerin (NITROSTAT) 0.4 mg SL tablet 0.4 mg by SubLINGual route every five (5) minutes as needed for Chest Pain. raNITIdine (ZANTAC) 300 mg tablet Take 300 mg by mouth daily. !! - Potential duplicate medications found. Please discuss with provider.           Activity: Activity as tolerated    Diet: Diabetic Diet    Wound Care: Keep wound clean and dry    Follow-up: wound clinic, office 2 wks

## 2018-07-30 NOTE — PROGRESS NOTES
Problem: Mobility Impaired (Adult and Pediatric) Goal: *Acute Goals and Plan of Care (Insert Text) Physical Therapy Goals Initiated 7/27/2018 and to be accomplished within 5 day(s) 1. Patient will move from supine to sit and sit to supine  in bed with supervision/set-up. 2.  Patient will transfer from bed to chair and chair to bed with supervision/set-up using the least restrictive device. 3.  Patient will perform sit to stand with supervision/set-up. 4.  Patient will ambulate with supervision/set-up for >150 feet with the least restrictive device. 5.  Patient will ascend/descend 13 stairs with handrail(s) with minimal assistance/contact guard assist for safe home entry. Pt refused PT due to: 
[]  Nausea/vomiting 
[]  Eating 
[]  Pain 
[]  Pt lethargic [x]  Off Unit to vascular Will f/u later. Thank you.  
Rosa Kennedy, PTA

## 2018-07-30 NOTE — PROGRESS NOTES
Dual AVS reviewed with DIOGO Van RN. All medications reviewed individually with patient. Opportunities for questions and concerns provided. Patient will be discharged via [facility] to the main entrance via [facility] based arranged transport. Patient's arm band and IV  appropriately discarded. Taxi cancelled. Ride provided by a friend.

## 2018-07-30 NOTE — PROGRESS NOTES
Hospitalist Progress Note Patient: Anjelica Cornejo MRN: [id number]  CSN: [id number] YOB: 1960  Age: 63 y.o. Sex: male DOA: 7/25/2018 LOS:  LOS: 4 days Chief Complaint: 
Knee infection Assessment/Plan Right knee infection-wound vac on, s/p surgical debridement, recent TKR Post operative wound dehiscience and blistering NIDDM Psychiatric disorder CAD 
HTN Medicine reengaged this am as wife concerned about a reaction to roxicodone-the complaint is leg swelling-I do not see a reaction to medicine Will give one dose lasix for bilateral edema, and will check duplex of LE to rule out DVT Medicine was asked that ID not be engaged unless requested by orthopedics BG are reasonably controlled If ID is necessary to be consulted both ID and ortho agreed last week that it will be a direct communication consult Medicine happy to follow patient still if necessary-will review duplex results-I added lovenox for DVT prophylaxis Continue DVT prophylaxis and PT daily Disposition : 
Patient Active Problem List  
Diagnosis Code  Wound infection T14. 8XXA, L08.9  Diabetes mellitus type 2, controlled ([facility] E11.9  
 CAD (coronary artery disease) I25.10  Hypertension I10  
 Psychiatric disorder F99 Subjective: 
 
Denies CP, SOB, rash, itching Wife concerned his legs are getting more swollen No uncontrolled pain, roxicodone is working for his pain Review of systems: 
 
Constitutional: denies fevers, chills, myalgias Respiratory: denies SOB, cough Cardiovascular: denies chest pain, palpitations Gastrointestinal: denies nausea, vomiting, diarrhea Vital signs/Intake and Output: 
Visit Vitals  BP (!) 147/94  Pulse 76  Temp 98.7 °F (37.1 °C)  Resp 20  
 Ht 5' 11\" (1.803 m)  Wt 89.8 kg (198 lb)  SpO2 100%  BMI 27.62 kg/m2 Current Shift:    
Last three shifts:  07/28 1901 - 07/30 0700 In: 2111 [P.O.:2140; I.V.:1200] Out: [address] Exam: 
 
General: Well developed, alert, NAD, OX3 Head/Neck: NCAT, supple, No masses, No lymphadenopathy CVS:Regular rate and rhythm, no M/R/G, S1/S2 heard, no thrill Lungs:Clear to auscultation bilaterally, no wheezes, rhonchi, or rales Abdomen: Soft, Nontender, No distention, Normal Bowel sounds, No hepatomegaly Extremities: right knee dressed, vac in place, 1 plus edema LE BL, DP palp BL, no erythema Neuro:grossly normal , follows commands Psych:appropriate Labs: Results:  
   
Chemistry Recent Labs  
   07/30/18 0449 07/29/18 
 0505  07/28/18 
 0250 GLU   --   143*  157* NA   --   143  137 K   --   4.0  4.3 CL   --   109*  105 CO2   --   30  30 BUN   --   12  15 CREA  1.15  1.25  1.20 CA   --   8.5  8.2* AGAP   --   4  2*  
BUCR   --   10*  13 CBC w/Diff Recent Labs  
   07/30/18 0449 07/29/18 
 0505  07/28/18 
 0250 WBC  7.1  8.3  8.0  
RBC  3.85*  3.83*  3.74* HGB  9.1*  9.1*  8.8* HCT  30.1*  29.9*  29.1*  
PLT  233  202  224 GRANS  51  57  73 LYMPH  37  33  19* EOS  4  3  0 Cardiac Enzymes No results for input(s): CPK, CKND1, ZAIN in the last 72 hours. No lab exists for component: Beacher Diones Coagulation No results for input(s): PTP, INR, APTT in the last 72 hours. No lab exists for component: INREXT, INREXT Lipid Panel No results found for: CHOL, CHOLPOCT, CHOLX, CHLST, CHOLV, 173649, HDL, LDL, LDLC, DLDLP, 817522, VLDLC, VLDL, TGLX, TRIGL, TRIGP, TGLPOCT, CHHD, CHHDX  
BNP No results for input(s): BNPP in the last 72 hours. Liver Enzymes No results for input(s): TP, ALB, TBIL, AP, SGOT, GPT in the last 72 hours. No lab exists for component: DBIL Thyroid Studies No results found for: T4, T3U, TSH, TSHEXT, TSHEXT Procedures/imaging: see electronic medical records for all procedures/Xrays and details which were not copied into this note but were reviewed prior to creation of Plan Magalie Umanzor, MD

## 2018-07-30 NOTE — PROGRESS NOTES
Problem: Falls - Risk of 
Goal: *Absence of Falls Document Redgie Curet Fall Risk and appropriate interventions in the flowsheet. Outcome: Progressing Towards Goal 
Fall Risk Interventions: 
Mobility Interventions: Patient to call before getting OOB, Utilize walker, cane, or other assistive device Mentation Interventions: Adequate sleep, hydration, pain control Medication Interventions: Assess postural VS orthostatic hypotension, Patient to call before getting OOB, Teach patient to arise slowly Elimination Interventions: Call light in reach, Patient to call for help with toileting needs, Urinal in reach History of Falls Interventions: Evaluate medications/consider consulting pharmacy

## 2018-07-30 NOTE — PROGRESS NOTES
20:20 Assessment completed. Lungs are clear bilat. but are slightly decreased in the bases. Ace wrap dsg over R knee remains C/D/I with  Wound vac in place. + CMS & + PP. Continues to  have 1-2+ edema in LE's Resting quietly in bed. 22:50 Shift assessment completed. See nsg flow sheet for details. 03:05 Reassessed with 0 changes noted. Ace wrap on R knee remains C/D/I with + CMS & + PP. Wound vac remains in place. Resting quietly in bed with eyes closed between cares x for voiding per urinal w/o difficulty. 07:35 Bedside and Verbal shift change report given to Luis Garcia RN (oncoming nurse) by Sonu Wilcox RN (offgoing nurse). Report included the following information SBAR.

## 2018-07-30 NOTE — DISCHARGE INSTRUCTIONS
DISCHARGE SUMMARY from Nurse    PATIENT INSTRUCTIONS:    After general anesthesia or intravenous sedation, for 24 hours or while taking prescription Narcotics:  · Limit your activities  · Do not drive and operate hazardous machinery  · Do not make important personal or business decisions  · Do  not drink alcoholic beverages  · If you have not urinated within 8 hours after discharge, please contact your surgeon on call. Report the following to your surgeon:  · Excessive pain, swelling, redness or odor of or around the surgical area  · Temperature over 100.5  · Nausea and vomiting lasting longer than 4 hours or if unable to take medications  · Any signs of decreased circulation or nerve impairment to extremity: change in color, persistent  numbness, tingling, coldness or increase pain  · Any questions    What to do at Home:  Recommended activity: Activity as tolerated,   please follow up with Dr Pablo Naylor in 2 weeks, call the office to schedule. *  Please give a list of your current medications to your Primary Care Provider. *  Please update this list whenever your medications are discontinued, doses are      changed, or new medications (including over-the-counter products) are added. *  Please carry medication information at all times in case of emergency situations. These are general instructions for a healthy lifestyle:    No smoking/ No tobacco products/ Avoid exposure to second hand smoke  Surgeon General's Warning:  Quitting smoking now greatly reduces serious risk to your health.     Obesity, smoking, and sedentary lifestyle greatly increases your risk for illness    A healthy diet, regular physical exercise & weight monitoring are important for maintaining a healthy lifestyle    You may be retaining fluid if you have a history of heart failure or if you experience any of the following symptoms:  Weight gain of 3 pounds or more overnight or 5 pounds in a week, increased swelling in our hands or feet or shortness of breath while lying flat in bed. Please call your doctor as soon as you notice any of these symptoms; do not wait until your next office visit. Recognize signs and symptoms of STROKE:    F-face looks uneven    A-arms unable to move or move unevenly    S-speech slurred or non-existent    T-time-call 911 as soon as signs and symptoms begin-DO NOT go       Back to bed or wait to see if you get better-TIME IS BRAIN. Warning Signs of HEART ATTACK     Call 911 if you have these symptoms:   Chest discomfort. Most heart attacks involve discomfort in the center of the chest that lasts more than a few minutes, or that goes away and comes back. It can feel like uncomfortable pressure, squeezing, fullness, or pain.  Discomfort in other areas of the upper body. Symptoms can include pain or discomfort in one or both arms, the back, neck, jaw, or stomach.  Shortness of breath with or without chest discomfort.  Other signs may include breaking out in a cold sweat, nausea, or lightheadedness. Don't wait more than five minutes to call 911 - MINUTES MATTER! Fast action can save your life. Calling 911 is almost always the fastest way to get lifesaving treatment. Emergency Medical Services staff can begin treatment when they arrive -- up to an hour sooner than if someone gets to the hospital by car. The discharge information has been reviewed with the patient. The patient verbalized understanding. Discharge medications reviewed with the patient and appropriate educational materials and side effects teaching were provided.   ___________________________________________________________________________________________________________________________________

## 2018-07-30 NOTE — PROGRESS NOTES
Transition of Care (YVON) Plan:     Chart reviewed, noted discharge. Pt for discharge home with [facility] for wound care/wound vac once home vac delivered. Spoke with wound care- home vac not available, pt will need wet to dry dressing changes until vac arrives to home. Met with pt to review discharge plan. Wound care will follow up on status of wound vac prior to pt leaving to confirm need for wet to dry dressing changes. No other DME needs. 1140- pt wound vac to be delivered to home, will discharge with wet to dry dressings [facility]. 1200- met with pt and girlfriend in room. Questions answered, [facility] to follow up with pt for wound care until wound vac arrives. Pt will need Medicaid transport home, unit secretary can arrange, pt provided number for transportation. YVON Transportation: How is patient being transported at discharge? Family/Friend When? Once cleared by Therapy between 12-2pm 
 
 Is transport scheduled? N/A Follow-up appointment and transportation: PCP/Specialist?  See AVS for Appointment Who is transporting to the follow-up appointment? Family/Friend Is transport for follow up appointment scheduled? N/A Communication plan (with patient/family): Who is being called? Patient or Next of Kin? Responsible party? Patient What number(s) is to be used? See [employer] What service provider is calling for [facility] services? When are they calling? 24-48 hours following discharge Readmission Risk? (Green/Low; Yellow/Moderate; Red/High):  Maxcine Pancoast Care Management Interventions PCP Verified by CM: Yes Transition of Care Consult (CM Consult): Home Health [address] No 
Reason Outside [place]: Patient already serviced by other home care/hospice agency [facility]) Discharge Durable Medical Equipment: No 
Physical Therapy Consult: Yes Occupational Therapy Consult: Yes Current Support Network: Lives with Spouse Confirm Follow Up Transport: Family Plan discussed with Pt/Family/Caregiver: Yes Freedom of Choice Offered: Yes Discharge Location Discharge Placement: Home with home health (vs inpt rehab)

## 2018-07-30 NOTE — ROUTINE PROCESS
Bedside and Verbal shift change report given to Donna Corbin RN (oncoming nurse) by Kenya Santiago RN (offgoing nurse). Report included the following information SBAR, Kardex and MAR.

## 2018-07-30 NOTE — WOUND CARE
Home Care Instructions Wound Cleansing & Dressings Daily wet to dry dressings to be applied from pt discharge from hospital to arrival of wound vac, once wound vac arrives please begin the following orders: 
 
Remove: All components of dressing including all pieces of black sponge. Clean wound with Normal Saline/Dermal Wound Cleanser Apply to wound bed: One piece black sponge cut slightly larger than wound bed to create bolster. Apply to brittanie-wound: Allkare skin prep or equivalent Cover with: Drape, followed by soft roll and ace wrap Change Dressing: Three times weekly Return Appointment: Per surgeon Wound VAC Therapy:  Apply skin prep and drape to brittanie-wound for protection. Cut Black sponge to wound size if available. Drape and connect to wound vac at 125 mm hg.  Document number of sponge pieces removed and applied.

## 2018-07-30 NOTE — WOUND CARE
Pt to d/c home with wet to dry dressing as [facility] is being delivered to the pts home, should be arriving in the next few days. [facility]

## 2018-07-30 NOTE — PROGRESS NOTES
Problem: Mobility Impaired (Adult and Pediatric) Goal: *Acute Goals and Plan of Care (Insert Text) Physical Therapy Goals Initiated 7/27/2018 and to be accomplished within 5 day(s) 1. Patient will move from supine to sit and sit to supine  in bed with supervision/set-up. 2.  Patient will transfer from bed to chair and chair to bed with supervision/set-up using the least restrictive device. 3.  Patient will perform sit to stand with supervision/set-up. 4.  Patient will ambulate with supervision/set-up for >150 feet with the least restrictive device. 5.  Patient will ascend/descend 13 stairs with handrail(s) with minimal assistance/contact guard assist for safe home entry. Pt refused PT due to: 
[]  Nausea/vomiting 
[]  Eating 
[]  Pain 
[]  Pt lethargic [x]  Nurse currently in with patient. CG states, \" Oh no, we are about to leave\" DC order does not specify patient needing to clear PT. Thank you.  
Quentin Walton, PTA

## 2018-07-30 NOTE — PROGRESS NOTES
0740 
Pt is awake, alert, oriented x 4. Sitting at edge of bed  and eating breakfast.  
10:14 AM 
Pt is awake, alert, oriented x 4. Ambulated to BR . Pt voided and had a bowel movement. Pt has ace wrap dressing . Pt has wound vac with scant amt old reddish fluids. Pt 's leg slightly swollen. Lasix 40 mg IV given. Lungs are clear. Abdomen soft with active BS. Dr Priscilla Morales in to see pt. As per Dr Priscilla Morales, will order discharge orders for pt.   
1130 Pt went down for Doppler study of both LE's via bed. .   
1200 Pt came back to room via bed.

## 2018-07-31 LAB
BACTERIA SPEC CULT: NORMAL
BACTERIA SPEC CULT: NORMAL
SERVICE CMNT-IMP: NORMAL
SERVICE CMNT-IMP: NORMAL

## 2018-08-03 NOTE — ADT AUTH CERT NOTES
Cellulitis - Care Day 4 (7/29/2018) by Sophie Wood RN     
  Review Entered Review Status    
  8/3/2018 Completed    
  Details    
      
  Care Day: 4 Care Date: 7/29/2018 Level of Care: Inpatient Floor    
  Guideline Day 2     
  Clinical Status    
  (X) * Dehydration absent    
  8/3/2018 10:01 AM EDT by Fei Michaud    
    ABSENT    
      
  (X) * Mental status at baseline    
  8/3/2018 10:01 AM EDT by Fei Michaud    
    BASELINE    
      
  (X) * Hemodynamic stability    
  8/3/2018 10:01 AM EDT by Fei Michaud    
    T 98.7, HR 83, RR 18, /71, SAT 99% ON RA    
      
  (X) * Fever absent or improved    
  8/3/2018 10:01 AM EDT by Fei Michaud    
    ABSENT    
      
      
  Routes    
  ( ) * Oral hydration, medications    
      
  Interventions    
  (X) WBC    
  8/3/2018 10:01 AM EDT by Fei Michaud    
    8.3    
      
      
  8/3/2018 10:01 AM EDT by Fei Michaud    
  Subject: Additional Clinical Information    
  Assessment/PlanActive Problems:   Wound infection (7/26/2018)Dr. Tierney to eval tomorrow and discuss need for ID consultation. Continue IV abx and PT. MEDS: VANC 1250MG IV Q12H, TRAMADOL 100MG PO Q6H, ZOSYN 3.375G IV Q8H, ROXICODONE 5MG PO X 1 & 10MG PO X 1, DILAUDID 2MG IV X 1, TYLENOL 650MG PO Q6H, NS 100ML/HWOUND CULT MANY STAPH AUREUS, MANY STENOTROPHOMAS MALTOPHILIA, MANY ACINETOBACTER CALCOACETICUS, MANY ACINETOBACTER BAUMANNII 2 MORPHOTYPES    
      
      
      
      
  * Milestone    
      
   
  Cellulitis - Care Day 3 (7/28/2018) by Sophie Wood RN     
  Review Entered Review Status    
  8/3/2018 Completed    
  Details    
      
  Care Day: 3 Care Date: 7/28/2018 Level of Care: Inpatient Floor    
  Guideline Day 2     
  Clinical Status    
  (X) * Dehydration absent    
  8/3/2018 9:55 AM EDT by Fei Michaud    
    ABSENT    
      
  (X) * Mental status at baseline    
  8/3/2018 9:55 AM EDT by Fei Michaud    
    BASELINE    
      
  (X) * Hemodynamic stability    
  8/3/2018 9:55 AM EDT by Ginny Healy    
    /87, HR 85, T 98.4, RR 16, SAT 99% ON RA    
      
  (X) * Fever absent or improved    
  8/3/2018 9:55 AM EDT by Ginny Healy    
    ABSENT    
      
      
  Routes    
  ( ) * Oral hydration, medications    
      
  Interventions    
  (X) WBC    
  8/3/2018 9:55 AM EDT by Ginny Healy    
    WBC 8.0    
      
      
  8/3/2018 9:55 AM EDT by Ginny Healy    
  Subject: Additional Clinical Information    
  63 y.o. male who c/o stable, mild-to-moderate joint symptoms intermittently, reasonably well controlled by PRN meds. MED: NS 100ML/H, TYLENOL 650MG PO Q6H, ROXICODONE 10MG PO X 3, ZOSYN 3.375G IV Q8H, TRAMADOL 100MG PO Q6H, VANC 1250MG IV Q12H    
      
      
      
      
  * Milestone

## 2018-08-28 ENCOUNTER — APPOINTMENT (OUTPATIENT)
Dept: VASCULAR SURGERY | Age: 58
End: 2018-08-28
Attending: EMERGENCY MEDICINE
Payer: MEDICARE

## 2018-08-28 ENCOUNTER — HOSPITAL ENCOUNTER (EMERGENCY)
Age: 58
Discharge: HOME | End: 2018-08-28
Attending: EMERGENCY MEDICINE | Admitting: EMERGENCY MEDICINE
Payer: MEDICARE

## 2018-08-28 ENCOUNTER — APPOINTMENT (OUTPATIENT)
Dept: GENERAL RADIOLOGY | Age: 58
End: 2018-08-28
Attending: EMERGENCY MEDICINE
Payer: MEDICARE

## 2018-08-28 VITALS
HEART RATE: 82 BPM | WEIGHT: 210 LBS | HEIGHT: 71 IN | TEMPERATURE: 98.3 F | OXYGEN SATURATION: 98 % | SYSTOLIC BLOOD PRESSURE: 129 MMHG | DIASTOLIC BLOOD PRESSURE: 88 MMHG | BODY MASS INDEX: 29.4 KG/M2 | RESPIRATION RATE: 18 BRPM

## 2018-08-28 DIAGNOSIS — G89.29 CHRONIC PAIN OF RIGHT KNEE: Primary | ICD-10-CM

## 2018-08-28 DIAGNOSIS — M00.9 INFECTION OF RIGHT KNEE (HCC): ICD-10-CM

## 2018-08-28 DIAGNOSIS — M25.561 CHRONIC PAIN OF RIGHT KNEE: Primary | ICD-10-CM

## 2018-08-28 LAB
ALBUMIN SERPL-MCNC: 3.5 G/DL (ref 3.4–5)
ALBUMIN/GLOB SERPL: 1 {RATIO} (ref 0.8–1.7)
ALP SERPL-CCNC: 108 U/L (ref 45–117)
ALT SERPL-CCNC: 31 U/L (ref 16–61)
ANION GAP SERPL CALC-SCNC: 8 MMOL/L (ref 3–18)
AST SERPL-CCNC: 15 U/L (ref 15–37)
BASOPHILS # BLD: 0 K/UL (ref 0–0.1)
BASOPHILS NFR BLD: 0 % (ref 0–2)
BILIRUB SERPL-MCNC: 0.4 MG/DL (ref 0.2–1)
BUN SERPL-MCNC: 19 MG/DL (ref 7–18)
BUN/CREAT SERPL: 18 (ref 12–20)
CALCIUM SERPL-MCNC: 8.4 MG/DL (ref 8.5–10.1)
CHLORIDE SERPL-SCNC: 104 MMOL/L (ref 100–108)
CO2 SERPL-SCNC: 28 MMOL/L (ref 21–32)
CREAT SERPL-MCNC: 1.08 MG/DL (ref 0.6–1.3)
DIFFERENTIAL METHOD BLD: ABNORMAL
EOSINOPHIL # BLD: 0.3 K/UL (ref 0–0.4)
EOSINOPHIL NFR BLD: 5 % (ref 0–5)
ERYTHROCYTE [DISTWIDTH] IN BLOOD BY AUTOMATED COUNT: 18 % (ref 11.6–14.5)
GLOBULIN SER CALC-MCNC: 3.6 G/DL (ref 2–4)
GLUCOSE SERPL-MCNC: 106 MG/DL (ref 74–99)
HCT VFR BLD AUTO: 34.7 % (ref 36–48)
HGB BLD-MCNC: 10.9 G/DL (ref 13–16)
LACTATE SERPL-SCNC: 1.1 MMOL/L (ref 0.4–2)
LYMPHOCYTES # BLD: 2.5 K/UL (ref 0.9–3.6)
LYMPHOCYTES NFR BLD: 34 % (ref 21–52)
MAGNESIUM SERPL-MCNC: 2 MG/DL (ref 1.6–2.6)
MCH RBC QN AUTO: 23.5 PG (ref 24–34)
MCHC RBC AUTO-ENTMCNC: 31.4 G/DL (ref 31–37)
MCV RBC AUTO: 74.9 FL (ref 74–97)
MONOCYTES # BLD: 0.5 K/UL (ref 0.05–1.2)
MONOCYTES NFR BLD: 7 % (ref 3–10)
NEUTS SEG # BLD: 3.9 K/UL (ref 1.8–8)
NEUTS SEG NFR BLD: 54 % (ref 40–73)
PLATELET # BLD AUTO: 211 K/UL (ref 135–420)
POTASSIUM SERPL-SCNC: 3.8 MMOL/L (ref 3.5–5.5)
PROT SERPL-MCNC: 7.1 G/DL (ref 6.4–8.2)
RBC # BLD AUTO: 4.63 M/UL (ref 4.7–5.5)
SODIUM SERPL-SCNC: 140 MMOL/L (ref 136–145)
WBC # BLD AUTO: 7.3 K/UL (ref 4.6–13.2)

## 2018-08-28 PROCEDURE — 93971 EXTREMITY STUDY: CPT

## 2018-08-28 PROCEDURE — 83735 ASSAY OF MAGNESIUM: CPT | Performed by: EMERGENCY MEDICINE

## 2018-08-28 PROCEDURE — 96360 HYDRATION IV INFUSION INIT: CPT

## 2018-08-28 PROCEDURE — 83605 ASSAY OF LACTIC ACID: CPT | Performed by: EMERGENCY MEDICINE

## 2018-08-28 PROCEDURE — 99282 EMERGENCY DEPT VISIT SF MDM: CPT

## 2018-08-28 PROCEDURE — 80053 COMPREHEN METABOLIC PANEL: CPT | Performed by: EMERGENCY MEDICINE

## 2018-08-28 PROCEDURE — 74011250636 HC RX REV CODE- 250/636: Performed by: EMERGENCY MEDICINE

## 2018-08-28 PROCEDURE — 73564 X-RAY EXAM KNEE 4 OR MORE: CPT

## 2018-08-28 PROCEDURE — 85025 COMPLETE CBC W/AUTO DIFF WBC: CPT | Performed by: EMERGENCY MEDICINE

## 2018-08-28 RX ORDER — CEPHALEXIN 500 MG/1
500 CAPSULE ORAL 4 TIMES DAILY
COMMUNITY

## 2018-08-28 RX ADMIN — SODIUM CHLORIDE 1000 ML: 900 INJECTION, SOLUTION INTRAVENOUS at 09:27

## 2018-08-28 NOTE — DISCHARGE INSTRUCTIONS
Knee Pain or Injury: Care Instructions  Your Care Instructions    Injuries are a common cause of knee problems. Sudden (acute) injuries may be caused by a direct blow to the knee. They can also be caused by abnormal twisting, bending, or falling on the knee. Pain, bruising, or swelling may be severe, and may start within minutes of the injury. Overuse is another cause of knee pain. Other causes are climbing stairs, kneeling, and other activities that use the knee. Everyday wear and tear, especially as you get older, also can cause knee pain. Rest, along with home treatment, often relieves pain and allows your knee to heal. If you have a serious knee injury, you may need tests and treatment. Follow-up care is a key part of your treatment and safety. Be sure to make and go to all appointments, and call your doctor if you are having problems. It's also a good idea to know your test results and keep a list of the medicines you take. How can you care for yourself at home? · Be safe with medicines. Read and follow all instructions on the label. ¨ If the doctor gave you a prescription medicine for pain, take it as prescribed. ¨ If you are not taking a prescription pain medicine, ask your doctor if you can take an over-the-counter medicine. · Rest and protect your knee. Take a break from any activity that may cause pain. · Put ice or a cold pack on your knee for 10 to 20 minutes at a time. Put a thin cloth between the ice and your skin. · Prop up a sore knee on a pillow when you ice it or anytime you sit or lie down for the next 3 days. Try to keep it above the level of your heart. This will help reduce swelling. · If your knee is not swollen, you can put moist heat, a heating pad, or a warm cloth on your knee. · If your doctor recommends an elastic bandage, sleeve, or other type of support for your knee, wear it as directed.   · Follow your doctor's instructions about how much weight you can put on your leg. Use a cane, crutches, or a walker as instructed. · Follow your doctor's instructions about activity during your healing process. If you can do mild exercise, slowly increase your activity. · Reach and stay at a healthy weight. Extra weight can strain the joints, especially the knees and hips, and make the pain worse. Losing even a few pounds may help. When should you call for help? Call 911 anytime you think you may need emergency care. For example, call if:    · You have symptoms of a blood clot in your lung (called a pulmonary embolism). These may include:  ¨ Sudden chest pain. ¨ Trouble breathing. ¨ Coughing up blood.    Call your doctor now or seek immediate medical care if:    · You have severe or increasing pain.     · Your leg or foot turns cold or changes color.     · You cannot stand or put weight on your knee.     · Your knee looks twisted or bent out of shape.     · You cannot move your knee.     · You have signs of infection, such as:  ¨ Increased pain, swelling, warmth, or redness. ¨ Red streaks leading from the knee. ¨ Pus draining from a place on your knee. ¨ A fever.     · You have signs of a blood clot in your leg (called a deep vein thrombosis), such as:  ¨ Pain in your calf, back of the knee, thigh, or groin. ¨ Redness and swelling in your leg or groin.    Watch closely for changes in your health, and be sure to contact your doctor if:    · You have tingling, weakness, or numbness in your knee.     · You have any new symptoms, such as swelling.     · You have bruises from a knee injury that last longer than 2 weeks.     · You do not get better as expected. Where can you learn more? Go to http://nathaly-meghana.info/. Enter K195 in the search box to learn more about \"Knee Pain or Injury: Care Instructions. \"  Current as of: November 20, 2017  Content Version: 11.7  © 9413-1021 [employer], [employer].  Care instructions adapted under license by [employer] (which disclaims liability or warranty for this information). If you have questions about a medical condition or this instruction, always ask your healthcare professional. [address] any warranty or liability for your use of this information.

## 2018-08-28 NOTE — ED TRIAGE NOTES
Patient to ED for evaluation of right knee infection. Patient presents with wound vac to knee following a knee replacement. States drainage color has changed and amount of drainage has increased as well as having a foul odor. Patient also reports burning and pain in the knee.

## 2018-08-28 NOTE — PROGRESS NOTES
Chart reviewed noted pt recently discharged from [facility] 7-30-18 with [facility] home health services cm will remain available to assist with readmission prevention if needed.

## 2018-08-28 NOTE — ED PROVIDER NOTES
EMERGENCY DEPARTMENT HISTORY AND PHYSICAL EXAM 
 
Date: 8/28/2018 Patient Name: Tuh Power History of Presenting Illness Chief Complaint Patient presents with  Knee Pain History Provided By: Patient and Patient's Wife Chief Complaint: Knee pain Duration: 1 Days Timing: Worsening Location: Right Knee Quality: Burning Severity: 9 out of 10 Associated Symptoms:  warmth of the knee, cough, sneezing, and SOB Additional History (Context):  
9:18 AM 
Thu Power is a 63 y.o. male with PMHX of HTN and PSHX cardiac stent presents to the emergency department C/O burning pain and swelling from the wound to the right knee, onset yesterday. Wound vac in place. Associated sxs include warmth of the knee, cough, sneezing, and SOB for a while. Pt had a knee replacement on 6/25/18 by Dr. Torie Dash and subsequently had a wound infection. Home health nurse examined the wound yesterday and was concerned for worsening infection. Pt has an appointment with Dr. Torie Dash today at 10:45 AM, but he did not have transportation. Pt has been on Keflex x1 month as rxed by Dr. Torie Dash. Pt denies fever, and any other sxs or complaints. PCP: Ju Abrams MD 
 
Current Outpatient Prescriptions Medication Sig Dispense Refill  cephALEXin (KEFLEX) 500 mg capsule Take 500 mg by mouth four (4) times daily.  oxyCODONE IR (ROXICODONE) 5 mg immediate release tablet Take 1 Tab by mouth every four (4) hours as needed. Max Daily Amount: 30 mg. 40 Tab 0  
 traMADol (ULTRAM) 50 mg tablet Take 2 Tabs by mouth every six (6) hours. Max Daily Amount: 400 mg. 60 Tab 0  
 acetaminophen (TYLENOL) 325 mg tablet Take 2 Tabs by mouth every six (6) hours as needed. 120 Tab 0  
 sertraline (ZOLOFT) 100 mg tablet Take 100 mg by mouth two (2) times a day.  docusate sodium (COLACE) 100 mg capsule Take 100 mg by mouth four (4) times daily as needed for Constipation.  QUEtiapine (SEROQUEL) 300 mg tablet Take 300 mg by mouth two (2) times a day. Indications: DEPRESSION TREATMENT ADJUNCT  clonazePAM (KLONOPIN) 0.5 mg tablet Take 0.5 mg by mouth two (2) times daily as needed.  calcium polycarbophil (FIBER-TABS) 625 mg tablet Take 625 mg by mouth daily.  clopidogrel (PLAVIX) 75 mg tab Take 75 mg by mouth.  atorvastatin (LIPITOR) 20 mg tablet Take 20 mg by mouth nightly.  lisinopril (PRINIVIL, ZESTRIL) 5 mg tablet Take 5 mg by mouth daily.  nitroglycerin (NITROSTAT) 0.4 mg SL tablet 0.4 mg by SubLINGual route every five (5) minutes as needed for Chest Pain.  raNITIdine (ZANTAC) 300 mg tablet Take 300 mg by mouth daily. Past History Past Medical History: 
Past Medical History:  
Diagnosis Date  Acid reflux  Arthritis  CAD (coronary artery disease) MI x 2 with stents  GERD (gastroesophageal reflux disease)  High cholesterol  Hypertension 2010 Past Surgical History: 
Past Surgical History:  
Procedure Laterality Date  HX CORONARY STENT PLACEMENT    
 HX HEENT    
 teeth removal  
 HX ORTHOPAEDIC Right   
 knee open procedure after accident  NEUROLOGICAL PROCEDURE UNLISTED    
 lower back Family History: 
History reviewed. No pertinent family history. Social History: 
Social History Substance Use Topics  Smoking status: Current Some Day Smoker Packs/day: 0.25 Years: 40.00  Smokeless tobacco: Never Used Comment: advised to hold all tobacco 24 hours prior  Alcohol use Yes Comment: 2 x month Allergies: 
No Known Allergies Review of Systems Review of Systems Constitutional: Negative for fever. HENT: Positive for sneezing. Respiratory: Positive for cough. (+) trouble breathing Gastrointestinal: Negative for vomiting. Musculoskeletal: Positive for joint swelling. All other systems reviewed and are negative.  
 
 
Physical Exam  
 
 Vitals:  
 08/28/18 6833 BP: 129/88 Pulse: 82 Resp: 18 Temp: 98.3 °F (36.8 °C) SpO2: 98% Weight: 95.3 kg (210 lb) Height: 5' 11\" (1.803 m) Physical Exam  
Nursing note and vitals reviewed. Constitutional: Well appearing, no acute distress Middle age , well appearing, NAD. Head: Normocephalic, Atraumatic Eyes: Pupils are equal, round, and reactive to light, EOMI Neck: Supple, non-tender Cardiovascular: Regular rate and rhythm, no murmurs, rubs, or gallops Chest: Normal work of breathing and chest excursion bilaterally Lungs: Clear to ausculation bilaterally, no wheezes, no rhonchi Abdomen: Soft, non tender, non distended, normoactive bowel sounds Back: No evidence of trauma or deformity Extremities: Wound vac over right knee with no visible purulent discharge, minimal erythema, minimal swelling, with a vertical scar with no surrounding erythema. Neurovascularly intact. No LE edema or deformity of the LLE. Skin: Warm and dry, normal cap refill Neuro: Alert and appropriate, CN intact, normal speech, strength and sensation full and symmetric bilaterally, normal gait, normal coordination Psychiatric: Normal mood and affect Diagnostic Study Results Labs - Recent Results (from the past 12 hour(s)) CBC WITH AUTOMATED DIFF Collection Time: 08/28/18 10:35 AM  
Result Value Ref Range WBC 7.3 4.6 - 13.2 K/uL  
 RBC 4.63 (L) 4.70 - 5.50 M/uL  
 HGB 10.9 (L) 13.0 - 16.0 g/dL HCT 34.7 (L) 36.0 - 48.0 % MCV 74.9 74.0 - 97.0 FL  
 MCH 23.5 (L) 24.0 - 34.0 PG  
 MCHC 31.4 31.0 - 37.0 g/dL  
 RDW 18.0 (H) 11.6 - 14.5 % PLATELET 982 996 - 832 K/uL NEUTROPHILS 54 40 - 73 % LYMPHOCYTES 34 21 - 52 % MONOCYTES 7 3 - 10 % EOSINOPHILS 5 0 - 5 % BASOPHILS 0 0 - 2 %  
 ABS. NEUTROPHILS 3.9 1.8 - 8.0 K/UL  
 ABS. LYMPHOCYTES 2.5 0.9 - 3.6 K/UL  
 ABS. MONOCYTES 0.5 0.05 - 1.2 K/UL  
 ABS. EOSINOPHILS 0.3 0.0 - 0.4 K/UL ABS. BASOPHILS 0.0 0.0 - 0.1 K/UL  
 DF AUTOMATED METABOLIC PANEL, COMPREHENSIVE Collection Time: 08/28/18 10:35 AM  
Result Value Ref Range Sodium 140 136 - 145 mmol/L Potassium 3.8 3.5 - 5.5 mmol/L Chloride 104 100 - 108 mmol/L  
 CO2 28 21 - 32 mmol/L Anion gap 8 3.0 - 18 mmol/L Glucose 106 (H) 74 - 99 mg/dL BUN 19 (H) 7.0 - 18 MG/DL Creatinine 1.08 0.6 - 1.3 MG/DL  
 BUN/Creatinine ratio 18 12 - 20 GFR est AA >60 >60 ml/min/1.73m2 GFR est non-AA >60 >60 ml/min/1.73m2 Calcium 8.4 (L) 8.5 - 10.1 MG/DL Bilirubin, total 0.4 0.2 - 1.0 MG/DL  
 ALT (SGPT) 31 16 - 61 U/L  
 AST (SGOT) 15 15 - 37 U/L Alk. phosphatase 108 45 - 117 U/L Protein, total 7.1 6.4 - 8.2 g/dL Albumin 3.5 3.4 - 5.0 g/dL Globulin 3.6 2.0 - 4.0 g/dL A-G Ratio 1.0 0.8 - 1.7 LACTIC ACID Collection Time: 08/28/18 10:35 AM  
Result Value Ref Range Lactic acid 1.1 0.4 - 2.0 MMOL/L  
MAGNESIUM Collection Time: 08/28/18 10:35 AM  
Result Value Ref Range Magnesium 2.0 1.6 - 2.6 mg/dL Radiologic Studies -  
XR KNEE RT MIN 4 V Final Result CT Results  (Last 48 hours) None CXR Results  (Last 48 hours) None Medical Decision Making I am the first provider for this patient. I reviewed the vital signs, available nursing notes, past medical history, past surgical history, family history and social history. Vital Signs-Reviewed the patient's vital signs. Pulse Oximetry Analysis - 98% on RA Records Reviewed: Nursing Notes Provider Notes:  
63 y.o. male with hx of HTN, HLD who presents with increasing pain and swelling of his right knee after knee replacement with complications of infections currently on chronic abx. Denies vomiting or objective fevers at home, but does endorse chills. On exam he is afebrile with appropriate vital signs.  Right knee with wound vac in place without no discharge, minimal erythema and swelling will obtain screening lab work, XR, and consult Dr. Renay Youngblood. Very low clinical suspicion for worsening infection. Procedures: 
Procedures ED Course:  
9:18 AM Initial assessment performed. The patients presenting problems have been discussed, and they are in agreement with the care plan formulated and outlined with them. I have encouraged them to ask questions as they arise throughout their visit. 11:59 AM Discussed patient's history, exam, and available diagnostics results with Dr. Renay Youngblood, orthopedic surgeon, who advised to do a Doppler to rule out DVT and further stated that if it is negative, the patient will need to follow up with him and the wound clinic on the same day. Further recommended continuing antibiotics. 1:20 PM DVT negative. D/C and urged FU ASAP with Dr. Renay Youngblood Diagnosis and Disposition DISCHARGE NOTE: 
1:26 PM 
Milady Diane  results have been reviewed with him. He has been counseled regarding his diagnosis, treatment, and plan. He verbally conveys understanding and agreement of the signs, symptoms, diagnosis, treatment and prognosis and additionally agrees to follow up as discussed. He also agrees with the care-plan and conveys that all of his questions have been answered. I have also provided discharge instructions for him that include: educational information regarding their diagnosis and treatment, and list of reasons why they would want to return to the ED prior to their follow-up appointment, should his condition change. He has been provided with education for proper emergency department utilization. CLINICAL IMPRESSION: 
 
1. Chronic pain of right knee 2. Infection of right knee ([address] PLAN: 
1. D/C Home 2. Current Discharge Medication List  
  
 
3. Follow-up Information Follow up With Details Comments Contact Info  Jessica Kaur MD Schedule an appointment as soon as possible for a visit in 3 days For orthopedic follow up. [address] [facility] [address] 
[phone] Max Guerrero MD Schedule an appointment as soon as possible for a visit in 3 days For primary care follow up. [address] [facility] [address] 
[phone] [facility] EMERGENCY DEPT Go to As needed, If symptoms worsen [address] [address] 
[phone]  
  
 
___________________________ Attestations:  
 
SCRIBE ATTESTATION: 
This note is prepared by [employer], acting as Scribe for General Orville DO. 
 
PROVIDER ATTESTATION: 
General Orville DO: The scribe's documentation has been prepared under my direction and personally reviewed by me in its entirety. I confirm that the note above accurately reflects all work, treatment, procedures, and medical decision making performed by me.  
_______________________________

## 2018-09-19 ENCOUNTER — HOSPITAL ENCOUNTER (OUTPATIENT)
Dept: WOUND CARE | Age: 58
Discharge: HOME | End: 2018-09-19
Payer: MEDICAID

## 2018-09-19 VITALS
OXYGEN SATURATION: 100 % | SYSTOLIC BLOOD PRESSURE: 105 MMHG | HEART RATE: 81 BPM | DIASTOLIC BLOOD PRESSURE: 89 MMHG | TEMPERATURE: 98.8 F

## 2018-09-19 PROCEDURE — 97602 WOUND(S) CARE NON-SELECTIVE: CPT

## 2018-09-19 PROCEDURE — 97597 DBRDMT OPN WND 1ST 20 CM/<: CPT

## 2018-09-19 PROCEDURE — 99212 OFFICE O/P EST SF 10 MIN: CPT

## 2018-09-19 NOTE — WOUND CARE
09/19/18 1702 Wound Knee Right Date First Assessed/Time First Assessed: 09/19/18 1530   Wound Type: Open incision/surgical site  Location: Knee  Orientation: Right Wound Length (cm) 3.1 cm Wound Width (cm) 3 cm Wound Depth (cm) 0.4 Wound Surface area (cm^2) 9.3 cm^2 Change in Wound Size % 0 Post debridement measurements and photo

## 2018-09-19 NOTE — WOUND CARE
09/19/18 1651 Wound Knee Right Date First Assessed/Time First Assessed: 09/19/18 1530   Wound Type: Open incision/surgical site  Location: Knee  Orientation: Right DRESSING STATUS Clean, dry, and intact DRESSING TYPE Absorptive Non-Pressure Injury Full thickness (subcut/muscle) Wound Length (cm) 3.1 cm Wound Width (cm) 3 cm Wound Depth (cm) 0.4 Wound Surface area (cm^2) 9.3 cm^2 Condition of Base Pink;Slough Condition of Edges Open Tissue Type Percent Pink 25 Tissue Type Percent Yellow 75 Direction of Undermining 2    oclock;3    oclock Depth of Undermining (cm) 1.2 Drainage Amount  Moderate Drainage Color Serosanguinous Wound Odor None Cleansing and Cleansing Agents  (foam wash, vashe and barrier wipes) Dressing Type Applied (santyl, saline moistened gauze, soft roll and coban) Procedure Tolerated Well

## 2018-09-25 ENCOUNTER — HOSPITAL ENCOUNTER (OUTPATIENT)
Dept: WOUND CARE | Age: 58
Discharge: HOME | End: 2018-09-25
Payer: MEDICAID

## 2018-09-25 VITALS
DIASTOLIC BLOOD PRESSURE: 81 MMHG | WEIGHT: 210 LBS | TEMPERATURE: 98.7 F | BODY MASS INDEX: 29.4 KG/M2 | HEIGHT: 71 IN | HEART RATE: 83 BPM | RESPIRATION RATE: 15 BRPM | OXYGEN SATURATION: 100 % | SYSTOLIC BLOOD PRESSURE: 109 MMHG

## 2018-09-25 PROCEDURE — 99211 OFF/OP EST MAY X REQ PHY/QHP: CPT

## 2018-09-26 NOTE — WOUND CARE
09/25/18 1343 Wound Knee Right Date First Assessed/Time First Assessed: 09/19/18 1530   Wound Type: Open incision/surgical site  Location: Knee  Orientation: Right DRESSING STATUS Removed DRESSING TYPE Absorptive Non-Pressure Injury Full thickness (subcut/muscle) Wound Length (cm) 3.2 cm Wound Width (cm) 3 cm Wound Depth (cm) 0.4 Wound Surface area (cm^2) 9.6 cm^2 Change in Wound Size % -3.23 Condition of Base Granulation;Pink;Slough Condition of Edges Open Tissue Type Percent Pink 50 Tissue Type Percent Red 20 Tissue Type Percent Yellow 30 Drainage Amount  Small Drainage Color Serous Wound Odor None Periwound Skin Condition Erythema, blanchable Cleansing and Cleansing Agents  Dermal wound cleanser (vashe) Dressing Type Applied (santyl,saline gauze, 4x4,gigi,softroll) Procedure Tolerated Well Dressing applied by RN per order

## 2018-09-27 ENCOUNTER — APPOINTMENT (OUTPATIENT)
Dept: WOUND CARE | Age: 58
End: 2018-09-27
Payer: MEDICAID

## 2018-10-01 ENCOUNTER — APPOINTMENT (OUTPATIENT)
Dept: WOUND CARE | Age: 58
End: 2018-10-01

## 2018-10-04 ENCOUNTER — APPOINTMENT (OUTPATIENT)
Dept: WOUND CARE | Age: 58
End: 2018-10-04

## 2018-10-16 ENCOUNTER — HOSPITAL ENCOUNTER (OUTPATIENT)
Dept: WOUND CARE | Age: 58
Discharge: HOME | End: 2018-10-16
Payer: MEDICAID

## 2018-10-16 VITALS
HEART RATE: 89 BPM | RESPIRATION RATE: 20 BRPM | DIASTOLIC BLOOD PRESSURE: 85 MMHG | SYSTOLIC BLOOD PRESSURE: 158 MMHG | TEMPERATURE: 98.5 F | OXYGEN SATURATION: 99 %

## 2018-10-16 PROBLEM — S81.001D OPEN KNEE WOUND, RIGHT, SUBSEQUENT ENCOUNTER: Status: ACTIVE | Noted: 2018-10-16

## 2018-10-16 PROCEDURE — 99214 OFFICE O/P EST MOD 30 MIN: CPT

## 2018-10-16 NOTE — WOUND CARE
[address] [facility] Follow up note. Chief Complaint: 
Chel Araya is a 63 y.o.  male who presents for follow up of right knee wound. He had right knee surgery June 25, he has non healing incision wound. He has been on antibiotics x 3 courses, he does not remember names of them. He had I&D done for the wound. Was told he had superficial infection. Review of systems General: No fevers or chills. Cardiovascular: No chest pain or pressure. No palpitations. Pulmonary: No shortness of breath. Gastrointestinal: No nausea, vomiting. Derm: See above Physical Exam:  
 
Visit Vitals  /85 (BP 1 Location: Left arm, BP Patient Position: Sitting)  Pulse 89  Temp 98.5 °F (36.9 °C)  Resp 20  SpO2 99% General: well developed, well nourished, pleasant , NAD. Hygiene good Psych: cooperative. Pleasant. No anxiety or depression. Normal mood and affect. Neuro: alert and oriented to person/place/situation. Otherwise nonfocal. 
Derm: Skin turgor normal for age, dry skin HEENT: Normocephalic, atraumatic. EOMI. Conjunctiva clear. No scleral icterus. Chest: Good air entry bilaterally. Respirations non labored Cardio[de-identified] Normal heart sounds,no rubs, murmurs or gallops Abdomen: Soft, nontender, nondistended, normoactive bowel sounds Lower extremities: color normal; temperature normal. Calves are supple, nontender. Capillary refill <3 sec Right knee open wound Wound Description: Wound Length: 2.5 cm Wound Width :1 cm Wound Depth :0.4 Etiology: non healing surgical wound Ulcer bed: Mixed Granular/Fibrotic Periwound: Edematous Exudate: Scant/small amount Serosanguinous exudate Data Review: No results found for this or any previous visit (from the past 24 hour(s)). Assessment:  
 
Patient Active Problem List  
Diagnosis Code  Diabetes mellitus type 2, controlled ([facility] [address] E11.9  
 CAD (coronary artery disease) I25.10  Hypertension I10  
  Psychiatric disorder F99  Open knee wound, right, subsequent encounter S81.001D 63 y.o. male with right knee non healing surgical wound. Needs : 
 
Good local wound care Nutrition optimization Plan: In wound care clinic today: 
Cleanse wound with NS or soap and water or commercial wound cleanser Apply the following topically to wound bed: Santyl Apply the following to brittanie-wound: NA Apply the following dressings: Absorptive dressing For Home Care/Self Care: 
Cleanse wound with NS or soap and water or commercial wound cleanser Keep dressing dry and intact when bathing Apply the following to wound bed: Santyl Apply the following to skin around wound: NA Apply the following dressings: Absorptive dressing Leave dressings in place until next visit Patient to return for wound care in: 14  Days, follow up with nurse visits. PLEASE CONTACT OFFICE AS SOON AS POSSIBLE IF UNABLE TO MAKE THIS APPOINTMENT. Inspect your wounds, looking for signs of infection which may include the following:  Increase in redness  Red \"streaks\" from wound  Increase in swelling  Fever  Unusual odor  Change in the amount of wound drainage. Should you experience any significant changes in your wound(s) or have any questions regarding your home care instructions please contact the wound center or your home health company. If after regular business hours, please call your family doctor or local emergency room. Edema Control:   Elevate legs as much as possible. Avoid standing in one position for more than 10 minutes. Avoid setting with legs down. Do not cross legs while sitting. Signed By: Harman Riley MD   
 October 16, 2018

## 2018-10-16 NOTE — WOUND CARE
10/16/18 1428 Wound Knee Right Date First Assessed/Time First Assessed: 09/19/18 1530   Wound Type: Open incision/surgical site  Location: Knee  Orientation: Right DRESSING STATUS Old drainage DRESSING TYPE Other (Comment) (drape, mepilex border,aqua cell ag ) Non-Pressure Injury Full thickness (subcut/muscle) Wound Length (cm) 2.5 cm Wound Width (cm) 1 cm Wound Depth (cm) 0.4 Wound Surface area (cm^2) 2.5 cm^2 Change in Wound Size % 73.12 Condition of Base Granulation;Slough Condition of Edges Rolled/curled Tissue Type Percent Red 70 Tissue Type Percent Yellow 30 Direction of Undermining 1    oclock;3    oclock Depth of Undermining (cm) 1 Drainage Amount  Small Drainage Color Serous Wound Odor None Periwound Skin Condition Intact Cleansing and Cleansing Agents  Other (comment) (vashe) Dressing Type Applied Other (Comment) (drape, silcone bordered foam, aqua cell ag) Procedure Tolerated Well

## 2018-10-23 ENCOUNTER — HOSPITAL ENCOUNTER (OUTPATIENT)
Dept: LAB | Age: 58
Discharge: HOME | End: 2018-10-23

## 2018-10-23 LAB — XX-LABCORP SPECIMEN COL,LCBCF: NORMAL

## 2018-10-23 PROCEDURE — 99001 SPECIMEN HANDLING PT-LAB: CPT

## 2018-11-04 ENCOUNTER — APPOINTMENT (OUTPATIENT)
Dept: GENERAL RADIOLOGY | Age: 58
End: 2018-11-04
Attending: PHYSICIAN ASSISTANT
Payer: MEDICAID

## 2018-11-04 ENCOUNTER — HOSPITAL ENCOUNTER (EMERGENCY)
Age: 58
Discharge: HOME | End: 2018-11-05
Attending: EMERGENCY MEDICINE
Payer: MEDICAID

## 2018-11-04 VITALS
WEIGHT: 207 LBS | HEART RATE: 69 BPM | BODY MASS INDEX: 28.98 KG/M2 | TEMPERATURE: 97.9 F | HEIGHT: 71 IN | SYSTOLIC BLOOD PRESSURE: 111 MMHG | RESPIRATION RATE: 18 BRPM | DIASTOLIC BLOOD PRESSURE: 94 MMHG | OXYGEN SATURATION: 99 %

## 2018-11-04 DIAGNOSIS — Z51.89 VISIT FOR WOUND CHECK: Primary | ICD-10-CM

## 2018-11-04 DIAGNOSIS — M25.561 RIGHT KNEE PAIN, UNSPECIFIED CHRONICITY: ICD-10-CM

## 2018-11-04 LAB
BASOPHILS # BLD: 0 K/UL (ref 0–0.1)
BASOPHILS NFR BLD: 0 % (ref 0–2)
DIFFERENTIAL METHOD BLD: ABNORMAL
EOSINOPHIL # BLD: 0.1 K/UL (ref 0–0.4)
EOSINOPHIL NFR BLD: 1 % (ref 0–5)
ERYTHROCYTE [DISTWIDTH] IN BLOOD BY AUTOMATED COUNT: 17.8 % (ref 11.6–14.5)
HCT VFR BLD AUTO: 36.7 % (ref 36–48)
HGB BLD-MCNC: 12 G/DL (ref 13–16)
LYMPHOCYTES # BLD: 1.9 K/UL (ref 0.9–3.6)
LYMPHOCYTES NFR BLD: 16 % (ref 21–52)
MCH RBC QN AUTO: 24 PG (ref 24–34)
MCHC RBC AUTO-ENTMCNC: 32.7 G/DL (ref 31–37)
MCV RBC AUTO: 73.4 FL (ref 74–97)
MONOCYTES # BLD: 0.6 K/UL (ref 0.05–1.2)
MONOCYTES NFR BLD: 5 % (ref 3–10)
NEUTS SEG # BLD: 9.1 K/UL (ref 1.8–8)
NEUTS SEG NFR BLD: 78 % (ref 40–73)
PLATELET # BLD AUTO: 212 K/UL (ref 135–420)
RBC # BLD AUTO: 5 M/UL (ref 4.7–5.5)
WBC # BLD AUTO: 11.6 K/UL (ref 4.6–13.2)

## 2018-11-04 PROCEDURE — 85025 COMPLETE CBC W/AUTO DIFF WBC: CPT | Performed by: PHYSICIAN ASSISTANT

## 2018-11-04 PROCEDURE — 73564 X-RAY EXAM KNEE 4 OR MORE: CPT

## 2018-11-04 PROCEDURE — 80053 COMPREHEN METABOLIC PANEL: CPT | Performed by: PHYSICIAN ASSISTANT

## 2018-11-04 PROCEDURE — 99284 EMERGENCY DEPT VISIT MOD MDM: CPT

## 2018-11-05 LAB
ALBUMIN SERPL-MCNC: 3.6 G/DL (ref 3.4–5)
ALBUMIN/GLOB SERPL: 1.2 {RATIO} (ref 0.8–1.7)
ALP SERPL-CCNC: 120 U/L (ref 45–117)
ALT SERPL-CCNC: 26 U/L (ref 16–61)
ANION GAP SERPL CALC-SCNC: 10 MMOL/L (ref 3–18)
AST SERPL-CCNC: 11 U/L (ref 15–37)
BILIRUB SERPL-MCNC: 0.4 MG/DL (ref 0.2–1)
BUN SERPL-MCNC: 26 MG/DL (ref 7–18)
BUN/CREAT SERPL: 26
CALCIUM SERPL-MCNC: 8.5 MG/DL (ref 8.5–10.1)
CHLORIDE SERPL-SCNC: 108 MMOL/L (ref 100–108)
CO2 SERPL-SCNC: 24 MMOL/L (ref 21–32)
CREAT SERPL-MCNC: 1.01 MG/DL (ref 0.6–1.3)
GLOBULIN SER CALC-MCNC: 3 G/DL (ref 2–4)
GLUCOSE SERPL-MCNC: 155 MG/DL (ref 74–99)
POTASSIUM SERPL-SCNC: 4.1 MMOL/L (ref 3.5–5.5)
PROT SERPL-MCNC: 6.6 G/DL (ref 6.4–8.2)
SODIUM SERPL-SCNC: 142 MMOL/L (ref 136–145)

## 2018-11-05 PROCEDURE — 74011000250 HC RX REV CODE- 250: Performed by: PHYSICIAN ASSISTANT

## 2018-11-05 PROCEDURE — 74011000250 HC RX REV CODE- 250

## 2018-11-05 RX ORDER — SILVER NITRATE 38.21; 12.74 MG/1; MG/1
1 STICK TOPICAL ONCE
Status: COMPLETED | OUTPATIENT
Start: 2018-11-05 | End: 2018-11-05

## 2018-11-05 RX ORDER — SILVER NITRATE 38.21; 12.74 MG/1; MG/1
STICK TOPICAL
Status: COMPLETED
Start: 2018-11-05 | End: 2018-11-05

## 2018-11-05 RX ADMIN — SILVER NITRATE APPLICATORS 1 APPLICATOR: 25; 75 STICK TOPICAL at 01:48

## 2018-11-05 RX ADMIN — SILVER NITRATE APPLICATORS 1 APPLICATOR: 25; 75 STICK TOPICAL at 01:47

## 2018-11-05 RX ADMIN — SILVER NITRATE APPLICATORS 1 APPLICATOR: 25; 75 STICK TOPICAL at 01:46

## 2018-11-05 RX ADMIN — SILVER NITRATE 1 APPLICATOR: 38.21; 12.74 STICK TOPICAL at 01:48

## 2018-11-05 RX ADMIN — SILVER NITRATE 1 APPLICATOR: 38.21; 12.74 STICK TOPICAL at 01:46

## 2018-11-05 NOTE — ED TRIAGE NOTES
Pt states \" I had a knee replacement in June and I got an infection and have been going to wound care. Tonight it started bleeding and has a small hole.

## 2018-11-05 NOTE — ED PROVIDER NOTES
EMERGENCY DEPARTMENT HISTORY AND PHYSICAL EXAM 
 
Date: 11/4/2018 Patient Name: Katie Tran History of Presenting Illness Chief Complaint Patient presents with  Knee Pain History Provided By: Patient Chief Complaint: Knee pain Duration: Few Hours Timing:  Worsening Location: Right knee Quality: Aching Severity: 8 out of 10 Modifying Factors: No modifying factors Associated Symptoms: Right knee wound Additional History (Context):  
11:25 PM 
Katie Tran is a 63 y.o. male with PMHX of HLD, CAD, GERD, Arthritis, who presents to the emergency department C/O right knee pain onset few hours. Associated sxs include wound to right knee. Pt states that he had right knee replacement 5 months ago. Pt denies fever, chills, and any other sxs or complaints. PCP: Allan Wan MD 
 
Current Outpatient Medications Medication Sig Dispense Refill  cephALEXin (KEFLEX) 500 mg capsule Take 500 mg by mouth four (4) times daily.  oxyCODONE IR (ROXICODONE) 5 mg immediate release tablet Take 1 Tab by mouth every four (4) hours as needed. Max Daily Amount: 30 mg. 40 Tab 0  
 traMADol (ULTRAM) 50 mg tablet Take 2 Tabs by mouth every six (6) hours. Max Daily Amount: 400 mg. 60 Tab 0  
 calcium polycarbophil (FIBER-TABS) 625 mg tablet Take 625 mg by mouth daily.  clopidogrel (PLAVIX) 75 mg tab Take 75 mg by mouth.  atorvastatin (LIPITOR) 20 mg tablet Take 20 mg by mouth nightly.  lisinopril (PRINIVIL, ZESTRIL) 5 mg tablet Take 5 mg by mouth daily.  acetaminophen (TYLENOL) 325 mg tablet Take 2 Tabs by mouth every six (6) hours as needed. 120 Tab 0  
 sertraline (ZOLOFT) 100 mg tablet Take 100 mg by mouth two (2) times a day.  docusate sodium (COLACE) 100 mg capsule Take 100 mg by mouth four (4) times daily as needed for Constipation.     
 QUEtiapine (SEROQUEL) 300 mg tablet Take 300 mg by mouth two (2) times a day. Indications: DEPRESSION TREATMENT ADJUNCT  clonazePAM (KLONOPIN) 0.5 mg tablet Take 0.5 mg by mouth two (2) times daily as needed.  nitroglycerin (NITROSTAT) 0.4 mg SL tablet 0.4 mg by SubLINGual route every five (5) minutes as needed for Chest Pain.  raNITIdine (ZANTAC) 300 mg tablet Take 300 mg by mouth daily. Past History Past Medical History: 
Past Medical History:  
Diagnosis Date  Acid reflux  Arthritis  CAD (coronary artery disease) MI x 2 with stents  GERD (gastroesophageal reflux disease)  High cholesterol  Hypertension 2010 Past Surgical History: 
Past Surgical History:  
Procedure Laterality Date  HX CORONARY STENT PLACEMENT    
 HX HEENT    
 teeth removal  
 HX ORTHOPAEDIC Right   
 knee open procedure after accident  NEUROLOGICAL PROCEDURE UNLISTED    
 lower back Family History: 
History reviewed. No pertinent family history. Social History: 
Social History Tobacco Use  Smoking status: Current Some Day Smoker Packs/day: 0.25 Years: 40.00 Pack years: 10.00  Smokeless tobacco: Never Used  Tobacco comment: advised to hold all tobacco 24 hours prior Substance Use Topics  Alcohol use: Yes Comment: 2 x month  Drug use: No  
 
 
Allergies: 
No Known Allergies Review of Systems Review of Systems Constitutional:  Denies malaise, fever, chills. Head:  Denies injury. Face:  Denies injury or pain. ENMT:  Denies sore throat. Neck:  Denies injury or pain. Chest:  Denies injury. Cardiac:  Denies chest pain or palpitations. Respiratory:  Denies cough, wheezing, difficulty breathing, shortness of breath. GI/ABD:  Denies injury, pain, distention, nausea, vomiting, diarrhea. :  Denies injury, pain, dysuria or urgency. Back:  Denies injury or pain. Pelvis:  Denies injury or pain. Extremity/MS:  (+) Right knee pain Neuro:  Denies headache, LOC, dizziness, neurologic symptoms/deficits/paresthesias. Skin: (+) wound to right knee. Denies injury, rash, itching or skin changes. Physical Exam  
 
Vitals:  
 11/04/18 2308 BP: (!) 111/94 Pulse: 69 Resp: 18 Temp: 97.9 °F (36.6 °C) SpO2: 99% Weight: 93.9 kg (207 lb) Height: 5' 11\" (1.803 m) Physical Exam 
CONSTITUTIONAL: Alert, in no apparent distress; well-developed; well-nourished. HEAD:  Normocephalic, atraumatic. EYES: PERRL; EOM's intact. ENTM: Nose: No rhinorrhea; Throat: mucous membranes moist. Posterior pharynx-normal. 
Neck:  No JVD, supple without lymphadenopathy. RESP: Chest clear, equal breath sounds. CV: S1 and S2 WNL; No murmurs, gallops or rubs. GI: Abdomen soft and non-tender. No masses or organomegaly. UPPER EXT:  Normal inspection. LOWER EXT: right knee , 5/5 strength, n/v intact,  mild swelling, effusion no edema, no ecchymosis,  Open wound about 4 cm.diameter No active bleeding,  good granulation, moderate generalized knee tenderness NEURO: strength 5/5 and sym, sensation intact. SKIN: No rashes; Normal for age and stage. PSYCH:  Alert and oriented, normal affect. Diagnostic Study Results Labs - Recent Results (from the past 12 hour(s)) CBC WITH AUTOMATED DIFF Collection Time: 11/04/18 11:37 PM  
Result Value Ref Range WBC 11.6 4.6 - 13.2 K/uL  
 RBC 5.00 4.70 - 5.50 M/uL  
 HGB 12.0 (L) 13.0 - 16.0 g/dL HCT 36.7 36.0 - 48.0 % MCV 73.4 (L) 74.0 - 97.0 FL  
 MCH 24.0 24.0 - 34.0 PG  
 MCHC 32.7 31.0 - 37.0 g/dL  
 RDW 17.8 (H) 11.6 - 14.5 % PLATELET 191 698 - 702 K/uL NEUTROPHILS 78 (H) 40 - 73 % LYMPHOCYTES 16 (L) 21 - 52 % MONOCYTES 5 3 - 10 % EOSINOPHILS 1 0 - 5 % BASOPHILS 0 0 - 2 %  
 ABS. NEUTROPHILS 9.1 (H) 1.8 - 8.0 K/UL  
 ABS. LYMPHOCYTES 1.9 0.9 - 3.6 K/UL  
 ABS. MONOCYTES 0.6 0.05 - 1.2 K/UL  
 ABS. EOSINOPHILS 0.1 0.0 - 0.4 K/UL  
 ABS. BASOPHILS 0.0 0.0 - 0.1 K/UL  
 DF AUTOMATED METABOLIC PANEL, COMPREHENSIVE  
 Collection Time: 11/04/18 11:37 PM  
Result Value Ref Range Sodium 142 136 - 145 mmol/L Potassium 4.1 3.5 - 5.5 mmol/L Chloride 108 100 - 108 mmol/L  
 CO2 24 21 - 32 mmol/L Anion gap 10 3.0 - 18 mmol/L Glucose 155 (H) 74 - 99 mg/dL BUN 26 (H) 7.0 - 18 MG/DL Creatinine 1.01 0.6 - 1.3 MG/DL  
 BUN/Creatinine ratio 26 GFR est AA >60 >60 ml/min/1.73m2 GFR est non-AA >60 >60 ml/min/1.73m2 Calcium 8.5 8.5 - 10.1 MG/DL Bilirubin, total 0.4 0.2 - 1.0 MG/DL  
 ALT (SGPT) 26 16 - 61 U/L  
 AST (SGOT) 11 (L) 15 - 37 U/L Alk. phosphatase 120 (H) 45 - 117 U/L Protein, total 6.6 6.4 - 8.2 g/dL Albumin 3.6 3.4 - 5.0 g/dL Globulin 3.0 2.0 - 4.0 g/dL A-G Ratio 1.2 0.8 - 1.7 Radiologic Studies - moderate joint effusion, hardware in place XR KNEE RT MIN 4 V    (Results Pending) CT Results  (Last 48 hours) None CXR Results  (Last 48 hours) None Medications given in the ED- Medications - No data to display Medical Decision Making I am the first provider for this patient. I reviewed the vital signs, available nursing notes, past medical history, past surgical history, family history and social history. Vital Signs-Reviewed the patient's vital signs. Pulse Oximetry Analysis - 99% on RA Records Reviewed: Nursing Notes Provider Notes (Medical Decision Making): Will x-ray to rule out hardware involvement osteomyelitis check for effusions. Will order blood work to check for systemic infections. Believe wound to be well healing at this time no visible signs of infection. No signs of septic joint. IMPRESSION AND MEDICAL DECISION MAKING: 
Based upon the patient's presentation with noted HPI and PE, along with the work up done in the emergency department, I believe that the patient has a well healing wound at this time. Labs and imaging are reassuring at this time. Will have him follow up with Dr. Maria L Paulino for further evaluation. Procedures: 
Procedures ED Course:  
11:25 PM Initial assessment performed. The patients presenting problems have been discussed, and they are in agreement with the care plan formulated and outlined with them. I have encouraged them to ask questions as they arise throughout their visit. Diagnosis and Disposition DISCHARGE NOTE: 
12:47 AM 
 
Suha Diehl  results have been reviewed with him. He has been counseled regarding his diagnosis, treatment, and plan. He verbally conveys understanding and agreement of the signs, symptoms, diagnosis, treatment and prognosis and additionally agrees to follow up as discussed. He also agrees with the care-plan and conveys that all of his questions have been answered. I have also provided discharge instructions for him that include: educational information regarding their diagnosis and treatment, and list of reasons why they would want to return to the ED prior to their follow-up appointment, should his condition change. He has been provided with education for proper emergency department utilization. CLINICAL IMPRESSION: 
 
1. Visit for wound check 2. Right knee pain, unspecified chronicity PLAN: 
1. D/C Home 2. Current Discharge Medication List  
  
 
3. Follow-up Information Follow up With Specialties Details Why Contact Info Wan Rios MD Orthopedic Surgery Schedule an appointment as soon as possible for a visit in 1 day For follow up with orthopedic surgeon [address] [id number] 
[phone] [facility] EMERGENCY DEPT Emergency Medicine Go to As needed, if symptoms worsen [address] [place] [id number] 
[phone]  
  
 
_______________________________ Attestations: This note is prepared by [facility], acting as Scribe for Suresh Wesley PA-C.  
 
Suresh Wesley PA-C:  The scribe's documentation has been prepared under my direction and personally reviewed by me in its entirety. I confirm that the note above accurately reflects all work, treatment, procedures, and medical decision making performed by me. 
_______________________________

## 2018-11-05 NOTE — DISCHARGE INSTRUCTIONS
Wound Care: After Your Visit  Your Care Instructions  Taking good care of your wound at home will help it heal quickly and reduce your chance of infection. The doctor has checked you carefully, but problems can develop later. If you notice any problems or new symptoms, get medical treatment right away. Follow-up care is a key part of your treatment and safety. Be sure to make and go to all appointments, and call your doctor if you are having problems. It's also a good idea to know your test results and keep a list of the medicines you take. How can you care for yourself at home? · Clean the area with soap and water 2 times a day unless your doctor gives you different instructions. Don't use hydrogen peroxide or alcohol, which can slow healing. ¨ You may cover the wound with a thin layer of antibiotic ointment, such as bacitracin, and a nonstick bandage. ¨ Apply more ointment and replace the bandage as needed. · Take pain medicines exactly as directed. Some pain is normal with a wound, but do not ignore pain that is getting worse instead of better. You could have an infection. ¨ If the doctor gave you a prescription medicine for pain, take it as prescribed. ¨ If you are not taking a prescription pain medicine, ask your doctor if you can take an over-the-counter medicine. · Your doctor may have closed your wound with stitches (sutures), staples, or skin glue. ¨ If you have stitches, your doctor may remove them after several days to 2 weeks. Or you may have stitches that dissolve on their own. ¨ If you have staples, your doctor may remove them after 7 to 10 days. ¨ If your wound was closed with skin glue, the glue will wear off in a few days to 2 weeks. When should you call for help? Call your doctor now or seek immediate medical care if:  · You have signs of infection, such as:  ¨ Increased pain, swelling, warmth, or redness near the wound. ¨ Red streaks leading from the wound.   ¨ Pus draining from the wound. ¨ A fever. · You bleed so much from your incision that you soak one or more bandages over 2 to 4 hours. Watch closely for changes in your health, and be sure to contact your doctor if:  · The wound is not getting better each day. Where can you learn more? Go to Digerati.be  Enter M973 in the search box to learn more about \"Wound Care: After Your Visit. \"   © 0190-5921 [employer]. Care instructions adapted under license by [employer] (which disclaims liability or warranty for this information). This care instruction is for use with your licensed healthcare professional. If you have questions about a medical condition or this instruction, always ask your healthcare professional. [address] any warranty or liability for your use of this information. Content Version: 47.6.614223; Last Revised: April 23, 2012                 Knee Pain or Injury: Care Instructions  Your Care Instructions    Injuries are a common cause of knee problems. Sudden (acute) injuries may be caused by a direct blow to the knee. They can also be caused by abnormal twisting, bending, or falling on the knee. Pain, bruising, or swelling may be severe, and may start within minutes of the injury. Overuse is another cause of knee pain. Other causes are climbing stairs, kneeling, and other activities that use the knee. Everyday wear and tear, especially as you get older, also can cause knee pain. Rest, along with home treatment, often relieves pain and allows your knee to heal. If you have a serious knee injury, you may need tests and treatment. Follow-up care is a key part of your treatment and safety. Be sure to make and go to all appointments, and call your doctor if you are having problems. It's also a good idea to know your test results and keep a list of the medicines you take. How can you care for yourself at home? · Be safe with medicines.  Read and follow all instructions on the label. ? If the doctor gave you a prescription medicine for pain, take it as prescribed. ? If you are not taking a prescription pain medicine, ask your doctor if you can take an over-the-counter medicine. · Rest and protect your knee. Take a break from any activity that may cause pain. · Put ice or a cold pack on your knee for 10 to 20 minutes at a time. Put a thin cloth between the ice and your skin. · Prop up a sore knee on a pillow when you ice it or anytime you sit or lie down for the next 3 days. Try to keep it above the level of your heart. This will help reduce swelling. · If your knee is not swollen, you can put moist heat, a heating pad, or a warm cloth on your knee. · If your doctor recommends an elastic bandage, sleeve, or other type of support for your knee, wear it as directed. · Follow your doctor's instructions about how much weight you can put on your leg. Use a cane, crutches, or a walker as instructed. · Follow your doctor's instructions about activity during your healing process. If you can do mild exercise, slowly increase your activity. · Reach and stay at a healthy weight. Extra weight can strain the joints, especially the knees and hips, and make the pain worse. Losing even a few pounds may help. When should you call for help? Call 911 anytime you think you may need emergency care. For example, call if:    · You have symptoms of a blood clot in your lung (called a pulmonary embolism). These may include:  ? Sudden chest pain. ? Trouble breathing. ? Coughing up blood.    Call your doctor now or seek immediate medical care if:    · You have severe or increasing pain.     · Your leg or foot turns cold or changes color.     · You cannot stand or put weight on your knee.     · Your knee looks twisted or bent out of shape.     · You cannot move your knee.     · You have signs of infection, such as:  ? Increased pain, swelling, warmth, or redness.   ? Red streaks leading from the knee. ? Pus draining from a place on your knee. ? A fever.     · You have signs of a blood clot in your leg (called a deep vein thrombosis), such as:  ? Pain in your calf, back of the knee, thigh, or groin. ? Redness and swelling in your leg or groin.    Watch closely for changes in your health, and be sure to contact your doctor if:    · You have tingling, weakness, or numbness in your knee.     · You have any new symptoms, such as swelling.     · You have bruises from a knee injury that last longer than 2 weeks.     · You do not get better as expected. Where can you learn more? Go to http://nathaly-meghana.info/. Enter K195 in the search box to learn more about \"Knee Pain or Injury: Care Instructions. \"  Current as of: November 20, 2017  Content Version: 11.8  © 2610-7500 [employer]. Care instructions adapted under license by [employer] (which disclaims liability or warranty for this information). If you have questions about a medical condition or this instruction, always ask your healthcare professional. [address] [place] [address] any warranty or liability for your use of this information.

## 2018-11-16 NOTE — ED TRIAGE NOTES
Pt arrives via EMS from [facility] for complications post surgery. Pt had right knee surgery on Monday by Dr. Tea Staton. Pt was discharged on Thursday. Pt has blisters and swelling to right knee. Pt's spouse states that pt has had a fever since before his discharge on Thursday. Pt's spouse states that pt has not had bowel movement since Monday. Pt alert and oriented. Pt reports that he had blisters since after his surgery on Monday but \"they are worsening\". No

## 2018-12-05 ENCOUNTER — HOSPITAL ENCOUNTER (OUTPATIENT)
Dept: LAB | Age: 58
Discharge: HOME | End: 2018-12-05
Payer: MEDICARE

## 2018-12-05 LAB
FERRITIN SERPL-MCNC: 38 NG/ML (ref 8–388)
IRON SATN MFR SERPL: 29 %
IRON SERPL-MCNC: 89 UG/DL (ref 50–175)
TIBC SERPL-MCNC: 308 UG/DL (ref 250–450)

## 2018-12-05 PROCEDURE — 82728 ASSAY OF FERRITIN: CPT

## 2018-12-05 PROCEDURE — 36415 COLL VENOUS BLD VENIPUNCTURE: CPT

## 2018-12-05 PROCEDURE — 83550 IRON BINDING TEST: CPT

## 2018-12-06 LAB
FAX TO INFO,FAXT: NORMAL
FAX TO NUMBER,FAXN: NORMAL

## 2019-01-26 ENCOUNTER — HOSPITAL ENCOUNTER (EMERGENCY)
Age: 59
Discharge: HOME | End: 2019-01-26
Attending: EMERGENCY MEDICINE
Payer: MEDICARE

## 2019-01-26 VITALS
DIASTOLIC BLOOD PRESSURE: 82 MMHG | SYSTOLIC BLOOD PRESSURE: 135 MMHG | RESPIRATION RATE: 20 BRPM | OXYGEN SATURATION: 100 % | WEIGHT: 198 LBS | BODY MASS INDEX: 27.72 KG/M2 | TEMPERATURE: 97.9 F | HEART RATE: 63 BPM | HEIGHT: 71 IN

## 2019-01-26 DIAGNOSIS — Z20.2 EXPOSURE TO TRICHOMONAS: Primary | ICD-10-CM

## 2019-01-26 PROCEDURE — 99282 EMERGENCY DEPT VISIT SF MDM: CPT

## 2019-01-26 RX ORDER — METRONIDAZOLE 500 MG/1
500 TABLET ORAL 2 TIMES DAILY
Qty: 14 TAB | Refills: 0 | Status: SHIPPED | OUTPATIENT
Start: 2019-01-26 | End: 2019-02-02

## 2019-01-26 NOTE — DISCHARGE INSTRUCTIONS
Exposure to Sexually Transmitted Infections: Care Instructions  Your Care Instructions  Sexually transmitted infections (STIs) are those diseases spread by sexual contact. There are at least 20 different STIs, including chlamydia, gonorrhea, syphilis, and human immunodeficiency virus (HIV), which causes AIDS. Bacteria-caused STIs can be treated and cured. STIs caused by viruses, such as HIV, can be treated but not cured. Some STIs can reduce a woman's chances of getting pregnant in the future. STIs are spread during sexual contact, such as vaginal intercourse and oral or anal sex. Follow-up care is a key part of your treatment and safety. Be sure to make and go to all appointments, and call your doctor if you are having problems. It's also a good idea to know your test results and keep a list of the medicines you take. How can you care for yourself at home? · Your doctor may have given you a shot of antibiotics. If your doctor prescribed antibiotic pills, take them as directed. Do not stop taking them just because you feel better. You need to take the full course of antibiotics. · Do not have sexual contact while you have symptoms of an STI or are being treated for an STI. · Tell your sex partner (or partners) that he or she will need treatment. · If you are a woman, do not douche. Douching changes the normal balance of bacteria in the vagina and may spread an infection up into your reproductive organs. To prevent exposure to STIs in the future  · Use latex condoms every time you have sex. Use them from the beginning to the end of sexual contact. · Talk to your partner before you have sex. Find out if he or she has or is at risk for any STI. Keep in mind that a person may be able to spread an STI even if he or she does not have symptoms. · Do not have sex if you are being treated for an STI. · Do not have sex with anyone who has symptoms of an STI, such as sores on the genitals or mouth.   · Having one sex partner (who does not have STIs and does not have sex with anyone else) is a good way to avoid STIs. When should you call for help? Call your doctor now or seek immediate medical care if:    · You have new pain in your belly or pelvis.     · You have symptoms of a urinary tract infection. These may include:  ? Pain or burning when you urinate. ? A frequent need to urinate without being able to pass much urine. ? Pain in the flank, which is just below the rib cage and above the waist on either side of the back. ? Blood in your urine. ? A fever.     · You have new or worsening pain or swelling in the scrotum.    Watch closely for changes in your health, and be sure to contact your doctor if:    · You have unusual vaginal bleeding.     · You have a discharge from the vagina or penis.     · You have any new symptoms, such as sores, bumps, rashes, blisters, or warts.     · You have itching, tingling, pain, or burning in the genital or anal area.     · You think you may have an STI. Where can you learn more? Go to http://nathaly-meghana.info/. Enter C128 in the search box to learn more about \"Exposure to Sexually Transmitted Infections: Care Instructions. \"  Current as of: September 11, 2018  Content Version: 11.9  © 1441-4191 [employer]. Care instructions adapted under license by [employer] (which disclaims liability or warranty for this information). If you have questions about a medical condition or this instruction, always ask your healthcare professional. [address] [place] [address] any warranty or liability for your use of this information.

## 2019-01-26 NOTE — ED NOTES
Patient discharged at this time. Verbalized understanding of plan of care and discharge instructions. Prescriptions sent and understands how to administer at home. Arm band placed in shred it.

## 2019-01-26 NOTE — ED PROVIDER NOTES
EMERGENCY DEPARTMENT HISTORY AND PHYSICAL EXAM 
 
Date: 1/26/2019 Patient Name: Garry Alcocer History of Presenting Illness Chief Complaint Patient presents with  
 Other History Provided By: Patient Chief Complaint: exposure to trichomonas Duration: PTA Timing:  Constant Location:  Quality: n/a Severity: Mild Modifying Factors: none Associated Symptoms: dark urine Additional History (Context):  
12:04 PM  
Garry Alcocer is a 63 y.o. male who presents to the emergency department for exposure to trichomonas. Associated sxs include dark urine. Pt states his fiancée was recently dxed with trichomonas and would like to be treated. Endorses use of tobacco and social EtOH use. Pt denies use of illicit drugs, abd pain, penile discharge, dysuria, and any other sxs or complaints. PCP: Abiodun Fisher MD 
 
Current Outpatient Medications Medication Sig Dispense Refill  metroNIDAZOLE (FLAGYL) 500 mg tablet Take 1 Tab by mouth two (2) times a day for 7 days. 14 Tab 0  
 sertraline (ZOLOFT) 100 mg tablet Take 100 mg by mouth two (2) times a day.  clonazePAM (KLONOPIN) 0.5 mg tablet Take 0.5 mg by mouth two (2) times daily as needed.  clopidogrel (PLAVIX) 75 mg tab Take 75 mg by mouth.  atorvastatin (LIPITOR) 20 mg tablet Take 20 mg by mouth nightly.  lisinopril (PRINIVIL, ZESTRIL) 5 mg tablet Take 5 mg by mouth daily.  raNITIdine (ZANTAC) 300 mg tablet Take 300 mg by mouth daily.  cephALEXin (KEFLEX) 500 mg capsule Take 500 mg by mouth four (4) times daily.  docusate sodium (COLACE) 100 mg capsule Take 100 mg by mouth four (4) times daily as needed for Constipation.  QUEtiapine (SEROQUEL) 300 mg tablet Take 300 mg by mouth two (2) times a day. Indications: DEPRESSION TREATMENT ADJUNCT    
 calcium polycarbophil (FIBER-TABS) 625 mg tablet Take 625 mg by mouth daily.  nitroglycerin (NITROSTAT) 0.4 mg SL tablet 0.4 mg by SubLINGual route every five (5) minutes as needed for Chest Pain. Past History Past Medical History: 
Past Medical History:  
Diagnosis Date  Acid reflux  Arthritis  CAD (coronary artery disease) MI x 2 with stents  GERD (gastroesophageal reflux disease)  High cholesterol  Hypertension 2010 Past Surgical History: 
Past Surgical History:  
Procedure Laterality Date  HX CORONARY STENT PLACEMENT    
 HX HEENT    
 teeth removal  
 HX ORTHOPAEDIC Right   
 knee open procedure after accident  NEUROLOGICAL PROCEDURE UNLISTED    
 lower back Family History: 
History reviewed. No pertinent family history. Social History: 
Social History Tobacco Use  Smoking status: Current Some Day Smoker Packs/day: 0.25 Years: 40.00 Pack years: 10.00  Smokeless tobacco: Never Used  Tobacco comment: advised to hold all tobacco 24 hours prior Substance Use Topics  Alcohol use: Yes Comment: 2 x month  Drug use: No  
 
 
Allergies: 
No Known Allergies Review of Systems Review of Systems Gastrointestinal: Negative for abdominal pain. Genitourinary: Negative for discharge and dysuria. (+) Dark urine All other systems reviewed and are negative. Physical Exam  
 
Vitals:  
 01/26/19 1159 BP: 135/82 Pulse: 63 Resp: 20 Temp: 97.9 °F (36.6 °C) SpO2: 100% Weight: 89.8 kg (198 lb) Height: 5' 11\" (1.803 m) Physical Exam  
Nursing note and vitals reviewed. Vital signs and nursing notes reviewed CONSTITUTIONAL: Alert, in no apparent distress; well-developed; well-nourished. HEAD:  Normocephalic, atraumatic EYES: PERRL; EOM's intact. ENTM: Nose: no rhinorrhea; Throat: no erythema or exudate, mucous membranes moist 
Neck:  No JVD, supple without lymphadenopathy RESP: Chest clear, equal breath sounds. CV: S1 and S2 WNL; No murmurs, gallops or rubs. GI: Normal bowel sounds, abdomen soft and non-tender. No masses or organomegaly. : No costo-vertebral angle tenderness. BACK:  Non-tender UPPER EXT:  Normal inspection LOWER EXT: No edema, no calf tenderness. Distal pulses intact. NEURO: CN intact, reflexes 2/4 and sym, strength 5/5 and sym, sensation intact. SKIN: No rashes; Normal for age and stage. PSYCH:  Alert and oriented, normal affect. Diagnostic Study Results Labs - No results found for this or any previous visit (from the past 12 hour(s)). Radiologic Studies - No orders to display CT Results  (Last 48 hours) None CXR Results  (Last 48 hours) None Medications given in the ED- Medications - No data to display Medical Decision Making I am the first provider for this patient. I reviewed the vital signs, available nursing notes, past medical history, past surgical history, family history and social history. Vital Signs-Reviewed the patient's vital signs. Pulse Oximetry Analysis - 100% on RA Records Reviewed: Nursing Notes and Old Medical Records Provider Notes (Medical Decision Making): DDX: Exposure to trichomonas The patient was counseled on the dangers of tobacco use, and was advised to quit. Reviewed strategies to maximize success, including removing cigarettes and smoking materials from environment, stress management, substitution of other forms of reinforcement and support of family/friends. Procedures: 
Procedures ED Course:  
12:04 PM Initial assessment performed. The patients presenting problems have been discussed, and they are in agreement with the care plan formulated and outlined with them. I have encouraged them to ask questions as they arise throughout their visit. Discussion: 64 y/o male presenting for exposure to trichomonas. Pt notes dark urine, but is otherwise asymptomatic.  Pt appropriate for d/c with abx and f/u with PCP and health department for further STD testing. Diagnosis and Disposition DISCHARGE NOTE: 
12:21 PM  
Sumeet Contreras  results have been reviewed with him. He has been counseled regarding his diagnosis, treatment, and plan. He verbally conveys understanding and agreement of the signs, symptoms, diagnosis, treatment and prognosis and additionally agrees to follow up as discussed. He also agrees with the care-plan and conveys that all of his questions have been answered. I have also provided discharge instructions for him that include: educational information regarding their diagnosis and treatment, and list of reasons why they would want to return to the ED prior to their follow-up appointment, should his condition change. He has been provided with education for proper emergency department utilization. CLINICAL IMPRESSION: 
 
1. Exposure to trichomonas PLAN: 
1. D/C Home 2. Current Discharge Medication List  
  
START taking these medications Details  
metroNIDAZOLE (FLAGYL) 500 mg tablet Take 1 Tab by mouth two (2) times a day for 7 days. Qty: 14 Tab, Refills: 0 CONTINUE these medications which have NOT CHANGED Details  
sertraline (ZOLOFT) 100 mg tablet Take 100 mg by mouth two (2) times a day. clonazePAM (KLONOPIN) 0.5 mg tablet Take 0.5 mg by mouth two (2) times daily as needed. clopidogrel (PLAVIX) 75 mg tab Take 75 mg by mouth. atorvastatin (LIPITOR) 20 mg tablet Take 20 mg by mouth nightly. lisinopril (PRINIVIL, ZESTRIL) 5 mg tablet Take 5 mg by mouth daily. raNITIdine (ZANTAC) 300 mg tablet Take 300 mg by mouth daily. cephALEXin (KEFLEX) 500 mg capsule Take 500 mg by mouth four (4) times daily. docusate sodium (COLACE) 100 mg capsule Take 100 mg by mouth four (4) times daily as needed for Constipation.   
  
QUEtiapine (SEROQUEL) 300 mg tablet Take 300 mg by mouth two (2) times a day. Indications: DEPRESSION TREATMENT ADJUNCT  
  
calcium polycarbophil (FIBER-TABS) 625 mg tablet Take 625 mg by mouth daily. nitroglycerin (NITROSTAT) 0.4 mg SL tablet 0.4 mg by SubLINGual route every five (5) minutes as needed for Chest Pain. 3.  
Follow-up Information Follow up With Specialties Details Why Contact Info Fuentes Delgado MD Family Practice Schedule an appointment as soon as possible for a visit in 2 days for PCP follow-up [place] [address] [address] [place] [address] 
[phone] [address]  Schedule an appointment as soon as possible for a visit in 2 days for further STD testing Matthias REGINA Youssef [address]. [place] [facility] [address] 
[phone] [facility] EM[facility] Emergency Medicine Go to As needed, if symptoms worsen [address]  
[place] [facility] [address] 
[phone]  
  
 
_______________________________ Attestations: This note is prepared by Vishnu Reyes and Roshan Maxwell, acting as Scribe for Linn Dixon MD. 
 
Linn Dixon MD:  The scribe's documentation has been prepared under my direction and personally reviewed by me in its entirety. I confirm that the note above accurately reflects all work, treatment, procedures, and medical decision making performed by me. 
_______________________________

## 2019-02-03 NOTE — WOUND CARE
[facility] Wound Care CenterInitial Consult note Chief Complaint: 
Julisa Cosby is a 63 y.o.  male who presents with concerns of right knee wound. Referred by:  Dr. 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 Complicated by blistering and possible infection. He developed eschar on the central portion of knee and required surgical debridement. He had wound vac placed which was discontinued recently. He has completed 3 courses of antibiotics Wound caused by: non-healed surgical wound Current wound care: 
Offloading wound: n/a Appetite: good Wound associated pain: mild Diabetic: No 
Smoker: yes Past Medical History:  
Diagnosis Date  Acid reflux  Arthritis  CAD (coronary artery disease) MI x 2 with stents  GERD (gastroesophageal reflux disease)  High cholesterol  Hypertension 2010 Past Surgical History:  
Procedure Laterality Date  HX CORONARY STENT PLACEMENT    
 HX HEENT    
 teeth removal  
 HX ORTHOPAEDIC Right   
 knee open procedure after accident  NEUROLOGICAL PROCEDURE UNLISTED    
 lower back No family history on file. Social History Tobacco Use  Smoking status: Current Some Day Smoker Packs/day: 0.25 Years: 40.00 Pack years: 10.00  Smokeless tobacco: Never Used  Tobacco comment: advised to hold all tobacco 24 hours prior Substance Use Topics  Alcohol use: Yes Comment: 2 x month Prior to Admission medications Medication Sig Start Date End Date Taking? Authorizing Provider  
metroNIDAZOLE (FLAGYL) 500 mg tablet Take 1 Tab by mouth two (2) times a day for 7 days. 1/26/19 2/2/19  Ankush Lazar MD  
cephALEXin (KEFLEX) 500 mg capsule Take 500 mg by mouth four (4) times daily. Other, MD Laverne  
sertraline (ZOLOFT) 100 mg tablet Take 100 mg by mouth two (2) times a day.     Provider, Hector  
docusate sodium (COLACE) 100 mg capsule Take 100 mg by mouth four (4) times daily as needed for Constipation. Provider, Historical  
QUEtiapine (SEROQUEL) 300 mg tablet Take 300 mg by mouth two (2) times a day. Indications: DEPRESSION TREATMENT ADJUNCT    Provider, Historical  
clonazePAM (KLONOPIN) 0.5 mg tablet Take 0.5 mg by mouth two (2) times daily as needed. Provider, Historical  
calcium polycarbophil (FIBER-TABS) 625 mg tablet Take 625 mg by mouth daily. Provider, Historical  
clopidogrel (PLAVIX) 75 mg tab Take 75 mg by mouth. Laverne Up MD  
atorvastatin (LIPITOR) 20 mg tablet Take 20 mg by mouth nightly. Laverne Up MD  
lisinopril (PRINIVIL, ZESTRIL) 5 mg tablet Take 5 mg by mouth daily. Laverne Up MD  
nitroglycerin (NITROSTAT) 0.4 mg SL tablet 0.4 mg by SubLINGual route every five (5) minutes as needed for Chest Pain. Laverne Up MD  
raNITIdine (ZANTAC) 300 mg tablet Take 300 mg by mouth daily. Laverne Up MD  
 
No Known Allergies Review of Systems Gen: No fever, chills, malaise, weight loss/gain. Heent: No headache, rhinorrhea, epistaxis, ear pain, hearing loss, sinus pain, neck pain/stiffness, sore throat. Heart: No chest pain, palpitations, URENA, pnd, or orthopnea. Resp: No cough, hemoptysis, wheezing and shortness of breath. GI: No nausea, vomiting, diarrhea, constipation, melena or hematochezia. : No urinary obstruction, dysuria or hematuria. Derm: see above Musc/skeletal: no bone or joint complains. Vasc: No edema, cyanosis or claudication. Endo: No heat/cold intolerance, no polyuria,polydipsia or polyphagia. Neuro: No unilateral weakness, numbness, tingling. No seizures. Heme: No easy bruising or bleeding. Objective:  
 
Physical Exam:  
 
Visit Vitals /89 (BP Patient Position: Sitting) Pulse 81 Temp 98.8 °F (37.1 °C) SpO2 100% General: well developed, well nourished, pleasant , NAD. Hygiene good Psych: cooperative. Pleasant. No anxiety or depression. Normal mood and affect. Neuro: alert and oriented to person/place/situation. Otherwise nonfocal. 
Derm: Skin turgor for age, dry skin HEENT: Normocephalic, atraumatic. EOMI. Conjunctiva clear. No scleral icterus. Neck: Normal range of motion. No masses. Chest: Good air entry bilaterally. Respirations nonlabored Cardio[de-identified] Normal heart sounds,no rubs, murmurs or gallops Abdomen: Soft, nontender, nondistended, normoactive bowel sounds Lower extremities: color normal; temperature normal. Calves are supple, nontender. Capillary refill <3 sec Right knee open wound Wound Description: Wound Length: 3.1 cm Wound Width :3 cm Wound Depth :0.4 Etiology: non healing surgical wound Ulcer bed: Mixed Granular/Fibrotic Periwound: Edematous Exudate: Scant/small amount Serosanguinous exudate Data Review: No results found for this or any previous visit (from the past 24 hour(s)). Assessment:  
 
Patient Active Problem List  
Diagnosis Code  Diabetes mellitus type 2, controlled ([facility] [address] E11.9  
 CAD (coronary artery disease) I25.10  Hypertension I10  
 Psychiatric disorder F99  Open knee wound, right, subsequent encounter S81.001D 63 y.o. male with non healing post surgical wound of right knee Needs : 
Serial debridement- debrided today- see note below Good local wound care Edema management Plan:  
Informed consent obtained. Patient/family member explained about the need of the procedure. Consent in chart Debridement:  
Excisional debridement of open wound right knee Indication: to remove necrotic tissue/ devitalized tissue/ soft eschar/ i through subcutaneous tissue epidermis and dermis layer of wound bed Anesthesia: Topical 2% lidocaine jelly / Instrument: Curette, Blade,  Residual Necrosis: Present and scored Bleeding: <1ml Hemostasis: Pressure Patient tolerated procedure well Procedural Pain: minimal 
Post - procedural pain: minimal 
 
 Post debridement measurements: 3.1 x 3 x 0.4 cm Surface area debrided: 6 sq. cm In wound care clinic today: 
Cleanse wound with NS or soap and water or commercial wound cleanser Apply the following topically to wound bed: santyl Apply the following to brittanie-wound: NA Apply the following dressings: Absorptive dressing For Home Care/Self Care: 
Cleanse wound with NS or soap and water or commercial wound cleanser Keep dressing dry and intact when bathing Apply the following to wound bed: santyl Apply the following to skin around wound: NA Apply the following dressings: Absorptive dressing Leave dressings in place until next visit Patient to return for wound care in:   Days Follow up with Nurse visit as recommended. PLEASE CONTACT OFFICE AS SOON AS POSSIBLE IF UNABLE TO MAKE THIS APPOINTMENT. Inspect your wounds, looking for signs of infection which may include the following:  Increase in redness  Red \"streaks\" from wound  Increase in swelling  Fever  Unusual odor  Change in the amount of wound drainage. Should you experience any significant changes in your wound(s) or have any questions regarding your home care instructions please contact the wound center or your home health company. If after regular business hours, please call your family doctor or local emergency room. Edema Control:   Elevate legs as much as possible. Avoid standing in one position for more than 10 minutes. Avoid setting with legs down. Do not cross legs while sitting. Signed By: Deonte Lam MD   
 February 3, 2019

## 2019-02-05 ENCOUNTER — HOSPITAL ENCOUNTER (OUTPATIENT)
Dept: LAB | Age: 59
Discharge: HOME | End: 2019-02-05

## 2019-02-05 LAB — XX-LABCORP SPECIMEN COL,LCBCF: NORMAL

## 2019-02-05 PROCEDURE — 99001 SPECIMEN HANDLING PT-LAB: CPT

## 2021-11-22 NOTE — PROGRESS NOTES
Ortho  Chart reviewed. Currently receiving  PRBC's  Alert  Right knee ABD's have been removed . Occlusive dssg dc'ed. Staples in place. Mild swelling, ecchymotic  Imp: Anemic. Dropped HGB from post-op 10.4 to 6+. No active bleeding. Plan: Transfuse.  Re-eval in am. Not planning any operative intervention none

## 2022-03-18 PROBLEM — E11.9 DIABETES MELLITUS TYPE 2, CONTROLLED (HCC): Status: ACTIVE | Noted: 2018-07-30

## 2022-03-19 PROBLEM — S81.001D OPEN KNEE WOUND, RIGHT, SUBSEQUENT ENCOUNTER: Status: ACTIVE | Noted: 2018-10-16

## 2022-03-19 PROBLEM — F99 PSYCHIATRIC DISORDER: Status: ACTIVE | Noted: 2018-07-30

## 2022-03-19 PROBLEM — I10 HYPERTENSION: Status: ACTIVE | Noted: 2018-07-30

## 2022-03-19 PROBLEM — I25.10 CAD (CORONARY ARTERY DISEASE): Status: ACTIVE | Noted: 2018-07-30

## 2024-01-09 NOTE — PROGRESS NOTES
Lab paged for blood cultures   Received report on patient from [place]. Patient laying in bed with eyes closed. No symptoms of distress noted. Respirations appear equal and unlabored. Woundvac connected to right knee, scant red drainage noted. Patient denies pain at this time. Will continue to monitor.

## 2024-01-11 ENCOUNTER — TRANSCRIBE ORDERS (OUTPATIENT)
Facility: HOSPITAL | Age: 64
End: 2024-01-11

## 2024-01-11 DIAGNOSIS — M25.561 ACUTE PAIN OF RIGHT KNEE: Primary | ICD-10-CM

## 2024-01-25 ENCOUNTER — HOSPITAL ENCOUNTER (OUTPATIENT)
Facility: HOSPITAL | Age: 64
Discharge: HOME | End: 2024-01-25
Attending: ORTHOPAEDIC SURGERY
Payer: MEDICARE

## 2024-01-25 DIAGNOSIS — M25.561 ACUTE PAIN OF RIGHT KNEE: ICD-10-CM

## 2024-01-25 PROCEDURE — 73721 MRI JNT OF LWR EXTRE W/O DYE: CPT

## 2024-06-10 ENCOUNTER — HOSPITAL ENCOUNTER (OUTPATIENT)
Facility: HOSPITAL | Age: 64
Discharge: HOME | End: 2024-06-13
Payer: MEDICAID

## 2024-06-10 DIAGNOSIS — Z01.818 PRE-OP TESTING: Primary | ICD-10-CM

## 2024-06-10 LAB
ALBUMIN SERPL-MCNC: 3.7 G/DL (ref 3.4–5)
ALBUMIN/GLOB SERPL: 1 (ref 0.8–1.7)
ALP SERPL-CCNC: 88 U/L (ref 45–117)
ALT SERPL-CCNC: 18 U/L (ref 16–61)
ANION GAP SERPL CALC-SCNC: 3 MMOL/L (ref 3–18)
AST SERPL-CCNC: 12 U/L (ref 10–38)
BILIRUB SERPL-MCNC: 0.7 MG/DL (ref 0.2–1)
BUN SERPL-MCNC: 22 MG/DL (ref 7–18)
BUN/CREAT SERPL: 19 (ref 12–20)
CALCIUM SERPL-MCNC: 8.9 MG/DL (ref 8.5–10.1)
CHLORIDE SERPL-SCNC: 106 MMOL/L (ref 100–111)
CO2 SERPL-SCNC: 27 MMOL/L (ref 21–32)
CREAT SERPL-MCNC: 1.18 MG/DL (ref 0.6–1.3)
ERYTHROCYTE [DISTWIDTH] IN BLOOD BY AUTOMATED COUNT: 16.9 % (ref 11.6–14.5)
ERYTHROCYTE [SEDIMENTATION RATE] IN BLOOD: 19 MM/HR (ref 0–20)
GLOBULIN SER CALC-MCNC: 3.6 G/DL (ref 2–4)
GLUCOSE SERPL-MCNC: 92 MG/DL (ref 74–99)
HCT VFR BLD AUTO: 40.4 % (ref 36–48)
HGB BLD-MCNC: 13 G/DL (ref 13–16)
MCH RBC QN AUTO: 24.2 PG (ref 24–34)
MCHC RBC AUTO-ENTMCNC: 32.2 G/DL (ref 31–37)
MCV RBC AUTO: 75.1 FL (ref 78–100)
NRBC # BLD: 0 K/UL (ref 0–0.01)
NRBC BLD-RTO: 0 PER 100 WBC
PLATELET # BLD AUTO: 199 K/UL (ref 135–420)
PMV BLD AUTO: 9.7 FL (ref 9.2–11.8)
POTASSIUM SERPL-SCNC: 3.9 MMOL/L (ref 3.5–5.5)
PROT SERPL-MCNC: 7.3 G/DL (ref 6.4–8.2)
RBC # BLD AUTO: 5.38 M/UL (ref 4.35–5.65)
SODIUM SERPL-SCNC: 136 MMOL/L (ref 136–145)
WBC # BLD AUTO: 11.3 K/UL (ref 4.6–13.2)

## 2024-06-10 PROCEDURE — 93005 ELECTROCARDIOGRAM TRACING: CPT | Performed by: ORTHOPAEDIC SURGERY

## 2024-06-10 PROCEDURE — 80053 COMPREHEN METABOLIC PANEL: CPT

## 2024-06-10 PROCEDURE — 85027 COMPLETE CBC AUTOMATED: CPT

## 2024-06-10 PROCEDURE — 36415 COLL VENOUS BLD VENIPUNCTURE: CPT

## 2024-06-10 PROCEDURE — 85652 RBC SED RATE AUTOMATED: CPT

## 2024-06-11 LAB
BACTERIA SPEC CULT: NORMAL
BACTERIA SPEC CULT: NORMAL
EKG ATRIAL RATE: 74 BPM
EKG DIAGNOSIS: NORMAL
EKG P AXIS: 55 DEGREES
EKG P-R INTERVAL: 164 MS
EKG Q-T INTERVAL: 392 MS
EKG QRS DURATION: 84 MS
EKG QTC CALCULATION (BAZETT): 435 MS
EKG R AXIS: -62 DEGREES
EKG T AXIS: 12 DEGREES
EKG VENTRICULAR RATE: 74 BPM
SERVICE CMNT-IMP: NORMAL

## 2024-06-17 ENCOUNTER — ANESTHESIA EVENT (OUTPATIENT)
Facility: HOSPITAL | Age: 64
DRG: 467 | End: 2024-06-17
Payer: MEDICAID

## 2024-06-23 NOTE — ANESTHESIA PRE PROCEDURE
Department of Anesthesiology  Preprocedure Note       Name:  Adam May   Age:  64 y.o.  :  1960                                          MRN:  [id number]         Date:  2024      Surgeon: Surgeon(s):  Siddhartha Shay MD    Procedure: Procedure(s):  RIGHT TOTAL KNEE REVISION **INPATIENT STATUS DUE TO MEDICARE**    Medications prior to admission:   Prior to Admission medications    Medication Sig Start Date End Date Taking? Authorizing Provider   clopidogrel (PLAVIX) 75 MG tablet Take 1 tablet by mouth nightly Indications: Cardiac stent    ProviderTobias MD   famotidine (PEPCID) 20 MG tablet Take 1 tablet by mouth 2 times daily Indications: GERD    Tobias Toth MD   meloxicam (MOBIC) 15 MG tablet Take 1 tablet by mouth nightly Indications: osteoarthritis    ProviderTobias MD   gabapentin (NEURONTIN) 300 MG capsule Take 1 capsule by mouth 3 times daily. Indications: neuropathy Max Daily Amount: 900 mg    Tobias Ttoh MD   metoprolol succinate (TOPROL XL) 25 MG extended release tablet Take 1 tablet by mouth at bedtime Indications: HTN    ProviderTobias MD   atorvastatin (LIPITOR) 20 MG tablet Take 1 tablet by mouth at bedtime Indications: high cholesterol    ProviderTobias MD       Current medications:    No current facility-administered medications for this encounter.     Current Outpatient Medications   Medication Sig Dispense Refill    clopidogrel (PLAVIX) 75 MG tablet Take 1 tablet by mouth nightly Indications: Cardiac stent      famotidine (PEPCID) 20 MG tablet Take 1 tablet by mouth 2 times daily Indications: GERD      meloxicam (MOBIC) 15 MG tablet Take 1 tablet by mouth nightly Indications: osteoarthritis      gabapentin (NEURONTIN) 300 MG capsule Take 1 capsule by mouth 3 times daily. Indications: neuropathy Max Daily Amount: 900 mg      metoprolol succinate (TOPROL XL) 25 MG extended release tablet Take 1 tablet by mouth at bedtime Indications:

## 2024-06-24 ENCOUNTER — HOSPITAL ENCOUNTER (INPATIENT)
Facility: HOSPITAL | Age: 64
LOS: 1 days | Discharge: HOME HEALTH | DRG: 467 | End: 2024-06-25
Attending: ORTHOPAEDIC SURGERY | Admitting: ORTHOPAEDIC SURGERY
Payer: MEDICAID

## 2024-06-24 ENCOUNTER — ANESTHESIA (OUTPATIENT)
Facility: HOSPITAL | Age: 64
DRG: 467 | End: 2024-06-24
Payer: MEDICAID

## 2024-06-24 DIAGNOSIS — T84.038A ASEPTIC LOOSENING OF PROSTHETIC KNEE, INITIAL ENCOUNTER (HCC): Primary | ICD-10-CM

## 2024-06-24 DIAGNOSIS — Z96.659 ASEPTIC LOOSENING OF PROSTHETIC KNEE, INITIAL ENCOUNTER (HCC): Primary | ICD-10-CM

## 2024-06-24 LAB — GLUCOSE BLD STRIP.AUTO-MCNC: 150 MG/DL (ref 70–110)

## 2024-06-24 PROCEDURE — 7100000001 HC PACU RECOVERY - ADDTL 15 MIN: Performed by: ORTHOPAEDIC SURGERY

## 2024-06-24 PROCEDURE — 6360000002 HC RX W HCPCS: Performed by: ANESTHESIOLOGY

## 2024-06-24 PROCEDURE — C1713 ANCHOR/SCREW BN/BN,TIS/BN: HCPCS | Performed by: ORTHOPAEDIC SURGERY

## 2024-06-24 PROCEDURE — 3700000001 HC ADD 15 MINUTES (ANESTHESIA): Performed by: ORTHOPAEDIC SURGERY

## 2024-06-24 PROCEDURE — 6360000002 HC RX W HCPCS: Performed by: ORTHOPAEDIC SURGERY

## 2024-06-24 PROCEDURE — 97116 GAIT TRAINING THERAPY: CPT

## 2024-06-24 PROCEDURE — 2709999900 HC NON-CHARGEABLE SUPPLY: Performed by: ORTHOPAEDIC SURGERY

## 2024-06-24 PROCEDURE — 3700000000 HC ANESTHESIA ATTENDED CARE: Performed by: ORTHOPAEDIC SURGERY

## 2024-06-24 PROCEDURE — 2580000003 HC RX 258: Performed by: ORTHOPAEDIC SURGERY

## 2024-06-24 PROCEDURE — 6370000000 HC RX 637 (ALT 250 FOR IP): Performed by: ORTHOPAEDIC SURGERY

## 2024-06-24 PROCEDURE — 86901 BLOOD TYPING SEROLOGIC RH(D): CPT

## 2024-06-24 PROCEDURE — 86850 RBC ANTIBODY SCREEN: CPT

## 2024-06-24 PROCEDURE — 97161 PT EVAL LOW COMPLEX 20 MIN: CPT

## 2024-06-24 PROCEDURE — 3E0T3BZ INTRODUCTION OF ANESTHETIC AGENT INTO PERIPHERAL NERVES AND PLEXI, PERCUTANEOUS APPROACH: ICD-10-PCS | Performed by: ORTHOPAEDIC SURGERY

## 2024-06-24 PROCEDURE — 64447 NJX AA&/STRD FEMORAL NRV IMG: CPT | Performed by: SPECIALIST

## 2024-06-24 PROCEDURE — 3600000012 HC SURGERY LEVEL 2 ADDTL 15MIN: Performed by: ORTHOPAEDIC SURGERY

## 2024-06-24 PROCEDURE — 2720000010 HC SURG SUPPLY STERILE: Performed by: ORTHOPAEDIC SURGERY

## 2024-06-24 PROCEDURE — 2500000003 HC RX 250 WO HCPCS: Performed by: ANESTHESIOLOGY

## 2024-06-24 PROCEDURE — 6370000000 HC RX 637 (ALT 250 FOR IP): Performed by: ANESTHESIOLOGY

## 2024-06-24 PROCEDURE — 82962 GLUCOSE BLOOD TEST: CPT

## 2024-06-24 PROCEDURE — 0SRC0L9 REPLACEMENT OF RIGHT KNEE JOINT WITH MEDIAL UNICONDYLAR SYNTHETIC SUBSTITUTE, CEMENTED, OPEN APPROACH: ICD-10-PCS | Performed by: ORTHOPAEDIC SURGERY

## 2024-06-24 PROCEDURE — 2700000000 HC OXYGEN THERAPY PER DAY

## 2024-06-24 PROCEDURE — C1776 JOINT DEVICE (IMPLANTABLE): HCPCS | Performed by: ORTHOPAEDIC SURGERY

## 2024-06-24 PROCEDURE — 86900 BLOOD TYPING SEROLOGIC ABO: CPT

## 2024-06-24 PROCEDURE — 1100000000 HC RM PRIVATE

## 2024-06-24 PROCEDURE — 2500000003 HC RX 250 WO HCPCS: Performed by: NURSE ANESTHETIST, CERTIFIED REGISTERED

## 2024-06-24 PROCEDURE — 6360000002 HC RX W HCPCS: Performed by: NURSE ANESTHETIST, CERTIFIED REGISTERED

## 2024-06-24 PROCEDURE — 7100000000 HC PACU RECOVERY - FIRST 15 MIN: Performed by: ORTHOPAEDIC SURGERY

## 2024-06-24 PROCEDURE — 6370000000 HC RX 637 (ALT 250 FOR IP): Performed by: NURSE ANESTHETIST, CERTIFIED REGISTERED

## 2024-06-24 PROCEDURE — 36415 COLL VENOUS BLD VENIPUNCTURE: CPT

## 2024-06-24 PROCEDURE — 0SPC0LZ REMOVAL OF MEDIAL UNICONDYLAR SYNTHETIC SUBSTITUTE FROM RIGHT KNEE JOINT, OPEN APPROACH: ICD-10-PCS | Performed by: ORTHOPAEDIC SURGERY

## 2024-06-24 PROCEDURE — 3600000002 HC SURGERY LEVEL 2 BASE: Performed by: ORTHOPAEDIC SURGERY

## 2024-06-24 DEVICE — P.F.C. SIGMA POSTERIOR AUGMENT COMBO CEMENTED SIZE 3 8MM
Type: IMPLANTABLE DEVICE | Site: KNEE | Status: FUNCTIONAL
Brand: P.F.C. SIGMA

## 2024-06-24 DEVICE — SIGMA TIBIAL INSERT ROTATING PLATFORM TC3 SIZE 3 10MM GVF
Type: IMPLANTABLE DEVICE | Site: KNEE | Status: FUNCTIONAL
Brand: SIGMA

## 2024-06-24 DEVICE — P.F.C. SIGMA FEMORAL ADAPTER BOLT +2/-2
Type: IMPLANTABLE DEVICE | Site: KNEE | Status: FUNCTIONAL
Brand: P.F.C. SIGMA

## 2024-06-24 DEVICE — P.F.C. SIGMA FEMORAL ADAPTER 5 DEGREE
Type: IMPLANTABLE DEVICE | Site: KNEE | Status: FUNCTIONAL
Brand: P.F.C. SIGMA

## 2024-06-24 DEVICE — CEMENT BNE 40GM FULL DOSE PMMA W/O ANTIBIO HI VISC N RADPQ: Type: IMPLANTABLE DEVICE | Site: KNEE | Status: FUNCTIONAL

## 2024-06-24 DEVICE — UNIVERSAL STEM FLUTED 75MM X 14MM: Type: IMPLANTABLE DEVICE | Site: KNEE | Status: FUNCTIONAL

## 2024-06-24 DEVICE — UNIVERSAL FEMORAL SLEEVE FULL POROUS 31MM: Type: IMPLANTABLE DEVICE | Site: KNEE | Status: FUNCTIONAL

## 2024-06-24 DEVICE — SIGMA FEMORAL TC3 CEMENTED 3 RIGHT
Type: IMPLANTABLE DEVICE | Site: KNEE | Status: FUNCTIONAL
Brand: SIGMA

## 2024-06-24 DEVICE — TIBIAL TRAY ROTATING PLATFORM M.B.T. REVISION SIZE 3 CEMENTED: Type: IMPLANTABLE DEVICE | Site: KNEE | Status: FUNCTIONAL

## 2024-06-24 DEVICE — M.B.T. REVISION METAPHYSEAL SLEEVE POROUS 29MM: Type: IMPLANTABLE DEVICE | Site: KNEE | Status: FUNCTIONAL

## 2024-06-24 RX ORDER — DEXMEDETOMIDINE HYDROCHLORIDE 100 UG/ML
INJECTION, SOLUTION INTRAVENOUS
Status: COMPLETED
Start: 2024-06-24 | End: 2024-06-24

## 2024-06-24 RX ORDER — DEXAMETHASONE SODIUM PHOSPHATE 10 MG/ML
INJECTION, SOLUTION INTRAMUSCULAR; INTRAVENOUS
Status: COMPLETED | OUTPATIENT
Start: 2024-06-24 | End: 2024-06-24

## 2024-06-24 RX ORDER — SODIUM CHLORIDE, SODIUM LACTATE, POTASSIUM CHLORIDE, CALCIUM CHLORIDE 600; 310; 30; 20 MG/100ML; MG/100ML; MG/100ML; MG/100ML
INJECTION, SOLUTION INTRAVENOUS CONTINUOUS
Status: DISCONTINUED | OUTPATIENT
Start: 2024-06-24 | End: 2024-06-25 | Stop reason: HOSPADM

## 2024-06-24 RX ORDER — SODIUM CHLORIDE, SODIUM LACTATE, POTASSIUM CHLORIDE, CALCIUM CHLORIDE 600; 310; 30; 20 MG/100ML; MG/100ML; MG/100ML; MG/100ML
INJECTION, SOLUTION INTRAVENOUS CONTINUOUS
Status: DISCONTINUED | OUTPATIENT
Start: 2024-06-24 | End: 2024-06-24 | Stop reason: HOSPADM

## 2024-06-24 RX ORDER — ONDANSETRON 2 MG/ML
4 INJECTION INTRAMUSCULAR; INTRAVENOUS
Status: DISCONTINUED | OUTPATIENT
Start: 2024-06-24 | End: 2024-06-24 | Stop reason: HOSPADM

## 2024-06-24 RX ORDER — FAMOTIDINE 20 MG/1
20 TABLET, FILM COATED ORAL 2 TIMES DAILY
Status: DISCONTINUED | OUTPATIENT
Start: 2024-06-24 | End: 2024-06-25 | Stop reason: HOSPADM

## 2024-06-24 RX ORDER — MORPHINE SULFATE 2 MG/ML
2 INJECTION, SOLUTION INTRAMUSCULAR; INTRAVENOUS
Status: DISCONTINUED | OUTPATIENT
Start: 2024-06-24 | End: 2024-06-25 | Stop reason: HOSPADM

## 2024-06-24 RX ORDER — ACETAMINOPHEN 325 MG/1
650 TABLET ORAL ONCE
Status: COMPLETED | OUTPATIENT
Start: 2024-06-24 | End: 2024-06-24

## 2024-06-24 RX ORDER — EPHEDRINE SULFATE/0.9% NACL/PF 50 MG/5 ML
SYRINGE (ML) INTRAVENOUS PRN
Status: DISCONTINUED | OUTPATIENT
Start: 2024-06-24 | End: 2024-06-24 | Stop reason: SDUPTHER

## 2024-06-24 RX ORDER — ALBUTEROL SULFATE 90 UG/1
AEROSOL, METERED RESPIRATORY (INHALATION) PRN
Status: DISCONTINUED | OUTPATIENT
Start: 2024-06-24 | End: 2024-06-24 | Stop reason: SDUPTHER

## 2024-06-24 RX ORDER — DEXTROSE MONOHYDRATE 100 MG/ML
INJECTION, SOLUTION INTRAVENOUS CONTINUOUS PRN
Status: DISCONTINUED | OUTPATIENT
Start: 2024-06-24 | End: 2024-06-24 | Stop reason: HOSPADM

## 2024-06-24 RX ORDER — DEXMEDETOMIDINE HYDROCHLORIDE 100 UG/ML
INJECTION, SOLUTION INTRAVENOUS
Status: COMPLETED | OUTPATIENT
Start: 2024-06-24 | End: 2024-06-24

## 2024-06-24 RX ORDER — LABETALOL HYDROCHLORIDE 5 MG/ML
10 INJECTION, SOLUTION INTRAVENOUS
Status: DISCONTINUED | OUTPATIENT
Start: 2024-06-24 | End: 2024-06-24 | Stop reason: HOSPADM

## 2024-06-24 RX ORDER — SODIUM CHLORIDE 9 MG/ML
INJECTION, SOLUTION INTRAVENOUS CONTINUOUS
Status: DISCONTINUED | OUTPATIENT
Start: 2024-06-24 | End: 2024-06-25 | Stop reason: HOSPADM

## 2024-06-24 RX ORDER — FENTANYL CITRATE 50 UG/ML
25 INJECTION, SOLUTION INTRAMUSCULAR; INTRAVENOUS EVERY 5 MIN PRN
Status: DISCONTINUED | OUTPATIENT
Start: 2024-06-24 | End: 2024-06-24 | Stop reason: HOSPADM

## 2024-06-24 RX ORDER — CLOPIDOGREL BISULFATE 75 MG/1
75 TABLET ORAL
Status: DISCONTINUED | OUTPATIENT
Start: 2024-06-24 | End: 2024-06-25 | Stop reason: HOSPADM

## 2024-06-24 RX ORDER — MEPERIDINE HYDROCHLORIDE 50 MG/ML
12.5 INJECTION INTRAMUSCULAR; INTRAVENOUS; SUBCUTANEOUS AS NEEDED
Status: DISCONTINUED | OUTPATIENT
Start: 2024-06-24 | End: 2024-06-24 | Stop reason: HOSPADM

## 2024-06-24 RX ORDER — MIDAZOLAM HYDROCHLORIDE 1 MG/ML
INJECTION INTRAMUSCULAR; INTRAVENOUS PRN
Status: DISCONTINUED | OUTPATIENT
Start: 2024-06-24 | End: 2024-06-24 | Stop reason: SDUPTHER

## 2024-06-24 RX ORDER — TRANEXAMIC ACID 10 MG/ML
INJECTION, SOLUTION INTRAVENOUS PRN
Status: DISCONTINUED | OUTPATIENT
Start: 2024-06-24 | End: 2024-06-24 | Stop reason: SDUPTHER

## 2024-06-24 RX ORDER — DIPHENHYDRAMINE HYDROCHLORIDE 50 MG/ML
12.5 INJECTION INTRAMUSCULAR; INTRAVENOUS
Status: DISCONTINUED | OUTPATIENT
Start: 2024-06-24 | End: 2024-06-24 | Stop reason: HOSPADM

## 2024-06-24 RX ORDER — DROPERIDOL 2.5 MG/ML
0.62 INJECTION, SOLUTION INTRAMUSCULAR; INTRAVENOUS
Status: DISCONTINUED | OUTPATIENT
Start: 2024-06-24 | End: 2024-06-24 | Stop reason: HOSPADM

## 2024-06-24 RX ORDER — HYDROMORPHONE HYDROCHLORIDE 1 MG/ML
0.5 INJECTION, SOLUTION INTRAMUSCULAR; INTRAVENOUS; SUBCUTANEOUS EVERY 5 MIN PRN
Status: DISCONTINUED | OUTPATIENT
Start: 2024-06-24 | End: 2024-06-24 | Stop reason: HOSPADM

## 2024-06-24 RX ORDER — LIDOCAINE HYDROCHLORIDE 20 MG/ML
INJECTION, SOLUTION EPIDURAL; INFILTRATION; INTRACAUDAL; PERINEURAL PRN
Status: DISCONTINUED | OUTPATIENT
Start: 2024-06-24 | End: 2024-06-24 | Stop reason: SDUPTHER

## 2024-06-24 RX ORDER — ATORVASTATIN CALCIUM 20 MG/1
20 TABLET, FILM COATED ORAL NIGHTLY
Status: DISCONTINUED | OUTPATIENT
Start: 2024-06-24 | End: 2024-06-25 | Stop reason: HOSPADM

## 2024-06-24 RX ORDER — ROPIVACAINE HYDROCHLORIDE 5 MG/ML
INJECTION, SOLUTION EPIDURAL; INFILTRATION; PERINEURAL
Status: COMPLETED | OUTPATIENT
Start: 2024-06-24 | End: 2024-06-24

## 2024-06-24 RX ORDER — SODIUM CHLORIDE 9 MG/ML
INJECTION, SOLUTION INTRAVENOUS PRN
Status: DISCONTINUED | OUTPATIENT
Start: 2024-06-24 | End: 2024-06-24 | Stop reason: HOSPADM

## 2024-06-24 RX ORDER — DEXAMETHASONE SODIUM PHOSPHATE 10 MG/ML
INJECTION, SOLUTION INTRAMUSCULAR; INTRAVENOUS
Status: COMPLETED
Start: 2024-06-24 | End: 2024-06-24

## 2024-06-24 RX ORDER — MIDAZOLAM HYDROCHLORIDE 2 MG/2ML
INJECTION, SOLUTION INTRAMUSCULAR; INTRAVENOUS
Status: COMPLETED
Start: 2024-06-24 | End: 2024-06-24

## 2024-06-24 RX ORDER — ONDANSETRON 2 MG/ML
4 INJECTION INTRAMUSCULAR; INTRAVENOUS EVERY 6 HOURS PRN
Status: DISCONTINUED | OUTPATIENT
Start: 2024-06-24 | End: 2024-06-25 | Stop reason: HOSPADM

## 2024-06-24 RX ORDER — DEXAMETHASONE SODIUM PHOSPHATE 4 MG/ML
INJECTION, SOLUTION INTRA-ARTICULAR; INTRALESIONAL; INTRAMUSCULAR; INTRAVENOUS; SOFT TISSUE PRN
Status: DISCONTINUED | OUTPATIENT
Start: 2024-06-24 | End: 2024-06-24 | Stop reason: SDUPTHER

## 2024-06-24 RX ORDER — SODIUM CHLORIDE 0.9 % (FLUSH) 0.9 %
5-40 SYRINGE (ML) INJECTION PRN
Status: DISCONTINUED | OUTPATIENT
Start: 2024-06-24 | End: 2024-06-24 | Stop reason: HOSPADM

## 2024-06-24 RX ORDER — PROPOFOL 10 MG/ML
INJECTION, EMULSION INTRAVENOUS PRN
Status: DISCONTINUED | OUTPATIENT
Start: 2024-06-24 | End: 2024-06-24 | Stop reason: SDUPTHER

## 2024-06-24 RX ORDER — SODIUM CHLORIDE 0.9 % (FLUSH) 0.9 %
5-40 SYRINGE (ML) INJECTION EVERY 12 HOURS SCHEDULED
Status: DISCONTINUED | OUTPATIENT
Start: 2024-06-24 | End: 2024-06-24 | Stop reason: HOSPADM

## 2024-06-24 RX ORDER — NALOXONE HYDROCHLORIDE 0.4 MG/ML
INJECTION, SOLUTION INTRAMUSCULAR; INTRAVENOUS; SUBCUTANEOUS PRN
Status: DISCONTINUED | OUTPATIENT
Start: 2024-06-24 | End: 2024-06-24 | Stop reason: HOSPADM

## 2024-06-24 RX ORDER — DEXAMETHASONE SODIUM PHOSPHATE 4 MG/ML
INJECTION, SOLUTION INTRA-ARTICULAR; INTRALESIONAL; INTRAMUSCULAR; INTRAVENOUS; SOFT TISSUE
Status: DISCONTINUED | OUTPATIENT
Start: 2024-06-24 | End: 2024-06-24

## 2024-06-24 RX ORDER — MORPHINE SULFATE 4 MG/ML
4 INJECTION, SOLUTION INTRAMUSCULAR; INTRAVENOUS
Status: DISCONTINUED | OUTPATIENT
Start: 2024-06-24 | End: 2024-06-25 | Stop reason: HOSPADM

## 2024-06-24 RX ORDER — ONDANSETRON 2 MG/ML
INJECTION INTRAMUSCULAR; INTRAVENOUS PRN
Status: DISCONTINUED | OUTPATIENT
Start: 2024-06-24 | End: 2024-06-24 | Stop reason: SDUPTHER

## 2024-06-24 RX ORDER — INSULIN LISPRO 100 [IU]/ML
0-8 INJECTION, SOLUTION INTRAVENOUS; SUBCUTANEOUS ONCE
Status: DISCONTINUED | OUTPATIENT
Start: 2024-06-24 | End: 2024-06-24 | Stop reason: HOSPADM

## 2024-06-24 RX ORDER — METOPROLOL SUCCINATE 25 MG/1
25 TABLET, EXTENDED RELEASE ORAL NIGHTLY
Status: DISCONTINUED | OUTPATIENT
Start: 2024-06-24 | End: 2024-06-25 | Stop reason: HOSPADM

## 2024-06-24 RX ORDER — IPRATROPIUM BROMIDE AND ALBUTEROL SULFATE 2.5; .5 MG/3ML; MG/3ML
1 SOLUTION RESPIRATORY (INHALATION)
Status: DISCONTINUED | OUTPATIENT
Start: 2024-06-24 | End: 2024-06-24 | Stop reason: HOSPADM

## 2024-06-24 RX ORDER — GABAPENTIN 300 MG/1
300 CAPSULE ORAL 3 TIMES DAILY
Status: DISCONTINUED | OUTPATIENT
Start: 2024-06-24 | End: 2024-06-25 | Stop reason: HOSPADM

## 2024-06-24 RX ORDER — METOPROLOL SUCCINATE 25 MG/1
25 TABLET, EXTENDED RELEASE ORAL
Status: COMPLETED | OUTPATIENT
Start: 2024-06-24 | End: 2024-06-24

## 2024-06-24 RX ORDER — OXYCODONE HYDROCHLORIDE 5 MG/1
5 TABLET ORAL
Status: DISCONTINUED | OUTPATIENT
Start: 2024-06-24 | End: 2024-06-24 | Stop reason: HOSPADM

## 2024-06-24 RX ORDER — ACETAMINOPHEN 325 MG/1
650 TABLET ORAL EVERY 4 HOURS PRN
Status: DISCONTINUED | OUTPATIENT
Start: 2024-06-24 | End: 2024-06-25 | Stop reason: HOSPADM

## 2024-06-24 RX ADMIN — PROPOFOL 200 MG: 10 INJECTION, EMULSION INTRAVENOUS at 12:27

## 2024-06-24 RX ADMIN — TRANEXAMIC ACID 1 G: 10 INJECTION, SOLUTION INTRAVENOUS at 12:31

## 2024-06-24 RX ADMIN — FENTANYL CITRATE 25 MCG: 50 INJECTION INTRAMUSCULAR; INTRAVENOUS at 15:35

## 2024-06-24 RX ADMIN — HYDROMORPHONE HYDROCHLORIDE 0.5 MG: 1 INJECTION, SOLUTION INTRAMUSCULAR; INTRAVENOUS; SUBCUTANEOUS at 12:43

## 2024-06-24 RX ADMIN — ROPIVACAINE HYDROCHLORIDE 15 ML: 5 INJECTION, SOLUTION EPIDURAL; INFILTRATION; PERINEURAL at 12:01

## 2024-06-24 RX ADMIN — HYDROMORPHONE HYDROCHLORIDE 0.5 MG: 1 INJECTION, SOLUTION INTRAMUSCULAR; INTRAVENOUS; SUBCUTANEOUS at 12:59

## 2024-06-24 RX ADMIN — TRANEXAMIC ACID 1 G: 10 INJECTION, SOLUTION INTRAVENOUS at 14:47

## 2024-06-24 RX ADMIN — SODIUM CHLORIDE, SODIUM LACTATE, POTASSIUM CHLORIDE, AND CALCIUM CHLORIDE: 600; 310; 30; 20 INJECTION, SOLUTION INTRAVENOUS at 12:05

## 2024-06-24 RX ADMIN — WATER 2000 MG: 1 INJECTION INTRAMUSCULAR; INTRAVENOUS; SUBCUTANEOUS at 20:39

## 2024-06-24 RX ADMIN — DEXAMETHASONE SODIUM PHOSPHATE 4 MG: 10 INJECTION, SOLUTION INTRAMUSCULAR; INTRAVENOUS at 12:01

## 2024-06-24 RX ADMIN — HYDROMORPHONE HYDROCHLORIDE 0.5 MG: 1 INJECTION, SOLUTION INTRAMUSCULAR; INTRAVENOUS; SUBCUTANEOUS at 12:45

## 2024-06-24 RX ADMIN — ALBUTEROL SULFATE 2 PUFF: 108 AEROSOL, METERED RESPIRATORY (INHALATION) at 13:47

## 2024-06-24 RX ADMIN — GABAPENTIN 300 MG: 300 CAPSULE ORAL at 20:40

## 2024-06-24 RX ADMIN — MORPHINE SULFATE 4 MG: 4 INJECTION, SOLUTION INTRAMUSCULAR; INTRAVENOUS at 20:38

## 2024-06-24 RX ADMIN — MORPHINE SULFATE 4 MG: 4 INJECTION, SOLUTION INTRAMUSCULAR; INTRAVENOUS at 18:28

## 2024-06-24 RX ADMIN — ATORVASTATIN CALCIUM 20 MG: 20 TABLET, FILM COATED ORAL at 20:40

## 2024-06-24 RX ADMIN — ACETAMINOPHEN 650 MG: 325 TABLET ORAL at 23:04

## 2024-06-24 RX ADMIN — METOPROLOL SUCCINATE 25 MG: 25 TABLET, EXTENDED RELEASE ORAL at 11:37

## 2024-06-24 RX ADMIN — ONDANSETRON 4 MG: 2 INJECTION INTRAMUSCULAR; INTRAVENOUS at 12:40

## 2024-06-24 RX ADMIN — DEXAMETHASONE SODIUM PHOSPHATE 8 MG: 4 INJECTION INTRA-ARTICULAR; INTRALESIONAL; INTRAMUSCULAR; INTRAVENOUS; SOFT TISSUE at 12:40

## 2024-06-24 RX ADMIN — DEXMEDETOMIDINE HYDROCHLORIDE 0.2 ML: 100 INJECTION, SOLUTION INTRAVENOUS at 12:01

## 2024-06-24 RX ADMIN — Medication 10 MG: at 14:21

## 2024-06-24 RX ADMIN — GABAPENTIN 300 MG: 300 CAPSULE ORAL at 16:58

## 2024-06-24 RX ADMIN — LIDOCAINE HYDROCHLORIDE 100 MG: 20 INJECTION, SOLUTION EPIDURAL; INFILTRATION; INTRACAUDAL; PERINEURAL at 12:27

## 2024-06-24 RX ADMIN — MORPHINE SULFATE 4 MG: 4 INJECTION, SOLUTION INTRAMUSCULAR; INTRAVENOUS at 23:03

## 2024-06-24 RX ADMIN — HYDROMORPHONE HYDROCHLORIDE 0.5 MG: 1 INJECTION, SOLUTION INTRAMUSCULAR; INTRAVENOUS; SUBCUTANEOUS at 12:34

## 2024-06-24 RX ADMIN — CLOPIDOGREL BISULFATE 75 MG: 75 TABLET ORAL at 20:40

## 2024-06-24 RX ADMIN — Medication 10 MG: at 12:31

## 2024-06-24 RX ADMIN — Medication 10 MG: at 14:19

## 2024-06-24 RX ADMIN — SODIUM CHLORIDE, SODIUM LACTATE, POTASSIUM CHLORIDE, AND CALCIUM CHLORIDE: 600; 310; 30; 20 INJECTION, SOLUTION INTRAVENOUS at 14:10

## 2024-06-24 RX ADMIN — ACETAMINOPHEN 650 MG: 325 TABLET ORAL at 18:28

## 2024-06-24 RX ADMIN — MIDAZOLAM 2 MG: 1 INJECTION INTRAMUSCULAR; INTRAVENOUS at 12:23

## 2024-06-24 RX ADMIN — SODIUM CHLORIDE: 9 INJECTION, SOLUTION INTRAVENOUS at 16:58

## 2024-06-24 RX ADMIN — ACETAMINOPHEN 650 MG: 325 TABLET ORAL at 11:37

## 2024-06-24 RX ADMIN — WATER 2000 MG: 1 INJECTION INTRAMUSCULAR; INTRAVENOUS; SUBCUTANEOUS at 12:35

## 2024-06-24 RX ADMIN — FAMOTIDINE 20 MG: 20 TABLET, FILM COATED ORAL at 20:40

## 2024-06-24 RX ADMIN — SODIUM CHLORIDE, SODIUM LACTATE, POTASSIUM CHLORIDE, AND CALCIUM CHLORIDE: 600; 310; 30; 20 INJECTION, SOLUTION INTRAVENOUS at 13:07

## 2024-06-24 RX ADMIN — MIDAZOLAM 2 MG: 1 INJECTION INTRAMUSCULAR; INTRAVENOUS at 11:57

## 2024-06-24 ASSESSMENT — PAIN DESCRIPTION - PAIN TYPE
TYPE: SURGICAL PAIN

## 2024-06-24 ASSESSMENT — PAIN DESCRIPTION - DESCRIPTORS
DESCRIPTORS: ACHING;THROBBING
DESCRIPTORS: SORE;DISCOMFORT
DESCRIPTORS: THROBBING
DESCRIPTORS: THROBBING
DESCRIPTORS: ACHING;DISCOMFORT;DULL
DESCRIPTORS: ACHING;DISCOMFORT;DULL
DESCRIPTORS: ACHING;SHARP;SHOOTING

## 2024-06-24 ASSESSMENT — PAIN DESCRIPTION - LOCATION
LOCATION: KNEE
LOCATION: KNEE;BACK
LOCATION: KNEE

## 2024-06-24 ASSESSMENT — PAIN SCALES - GENERAL
PAINLEVEL_OUTOF10: 5
PAINLEVEL_OUTOF10: 5
PAINLEVEL_OUTOF10: 6
PAINLEVEL_OUTOF10: 10
PAINLEVEL_OUTOF10: 9
PAINLEVEL_OUTOF10: 10
PAINLEVEL_OUTOF10: 8

## 2024-06-24 ASSESSMENT — PAIN DESCRIPTION - ONSET
ONSET: ON-GOING
ONSET: GRADUAL
ONSET: GRADUAL

## 2024-06-24 ASSESSMENT — PAIN DESCRIPTION - FREQUENCY
FREQUENCY: CONTINUOUS
FREQUENCY: CONTINUOUS
FREQUENCY: INTERMITTENT
FREQUENCY: INTERMITTENT
FREQUENCY: CONTINUOUS

## 2024-06-24 ASSESSMENT — PAIN - FUNCTIONAL ASSESSMENT
PAIN_FUNCTIONAL_ASSESSMENT: 0-10
PAIN_FUNCTIONAL_ASSESSMENT: ACTIVITIES ARE NOT PREVENTED
PAIN_FUNCTIONAL_ASSESSMENT: ACTIVITIES ARE NOT PREVENTED
PAIN_FUNCTIONAL_ASSESSMENT: PREVENTS OR INTERFERES SOME ACTIVE ACTIVITIES AND ADLS
PAIN_FUNCTIONAL_ASSESSMENT: ACTIVITIES ARE NOT PREVENTED
PAIN_FUNCTIONAL_ASSESSMENT: ACTIVITIES ARE NOT PREVENTED
PAIN_FUNCTIONAL_ASSESSMENT: PREVENTS OR INTERFERES SOME ACTIVE ACTIVITIES AND ADLS
PAIN_FUNCTIONAL_ASSESSMENT: ACTIVITIES ARE NOT PREVENTED

## 2024-06-24 ASSESSMENT — PAIN DESCRIPTION - ORIENTATION
ORIENTATION: RIGHT

## 2024-06-24 NOTE — PERIOP NOTE
TRANSFER - OUT REPORT:    Verbal report given to DAVID Oneal on Adam May  being transferred to [address] [place] [address] for routine post-op       Report consisted of patient's Situation, Background, Assessment and   Recommendations(SBAR).     Information from the following report(s) Nurse Handoff Report, Adult Overview, Surgery Report, Intake/Output, MAR, and Med Rec Status was reviewed with the receiving nurse.           Lines:   Peripheral IV 06/24/24 Posterior;Right Forearm (Active)   Site Assessment Clean, dry & intact 06/24/24 1508   Line Status Infusing 06/24/24 1508   Line Care Connections checked and tightened 06/24/24 1508   Phlebitis Assessment No symptoms 06/24/24 1508   Infiltration Assessment 0 06/24/24 1508   Alcohol Cap Used No 06/24/24 1508   Dressing Status Clean, dry & intact 06/24/24 1508   Dressing Type Transparent 06/24/24 1508        Opportunity for questions and clarification was provided.      Patient transported with:  Registered Nurse

## 2024-06-24 NOTE — PROGRESS NOTES
1615-  Pt A&Ox 4. IV to the R arm infusing, dressing CDI. Pt denies pain at this time. VSS on 2L NC. Dressing to the R knee CDI. Pt denies numbness/tingling at this time. Pedal pulses +2, cap refill < 3 sec to BLE. Pt denies numbness/tingling at this time. SCDs  intact to LLE. Pt oriented to room, safety measures in place, call bell within reach. Wife at bedside.    1925-  Bedside and Verbal shift change report given to DAVID Cheema (oncoming nurse) by DAVID Oneal (offgoing nurse). Report included the following information Nurse Handoff Report, Adult Overview, Surgery Report, Intake/Output, MAR, and Recent Results.

## 2024-06-24 NOTE — H&P
Take 1 tablet by mouth nightly Indications: osteoarthritis    Tobias Toth MD   gabapentin (NEURONTIN) 300 MG capsule Take 1 capsule by mouth 3 times daily. Indications: neuropathy Max Daily Amount: 900 mg    Tobias Toth MD   metoprolol succinate (TOPROL XL) 25 MG extended release tablet Take 1 tablet by mouth at bedtime Indications: HTN    Tobias Toth MD   atorvastatin (LIPITOR) 20 MG tablet Take 1 tablet by mouth at bedtime Indications: high cholesterol    Tobias Toth MD       Allergies   Allergen Reactions    Aspirin Other (See Comments)     Stomach upset    Tramadol Itching     5-2024       Review of Systems  A comprehensive review of systems was negative except for that written in the History of Present Illness.      Physical Exam:      There were no vitals taken for this visit.    Physical Exam:  Physical Exam:   General:  Alert, cooperative, no distress, appears stated age.   Eyes:  Conjunctivae/corneas clear. PERRL, EOMs intact. Fundi benign   Ears:  Normal TMs and external ear canals both ears.   Nose: Nares normal. Septum midline. Mucosa normal. No drainage or sinus tenderness.   Mouth/Throat: Lips, mucosa, and tongue normal. Teeth and gums normal.   Neck: Supple, symmetrical, trachea midline, no adenopathy, thyroid: no enlargement/tenderness/nodules, no carotid bruit and no JVD.   Back:   Symmetric, no curvature. ROM normal. No CVA tenderness.   Lungs:   Clear to auscultation bilaterally.   Heart:  Regular rate and rhythm, S1, S2 normal, no murmur, click, rub or gallop.   Abdomen:   Soft, non-tender. Bowel sounds normal. No masses,  No organomegaly.   Extremities: Extremities normal, atraumatic, no cyanosis or edema. Right knee pain with ROM   Pulses: 2+ and symmetric all extremities.   Skin: Skin color, texture, turgor normal. No rashes or lesions   Lymph nodes: Cervical, supraclavicular, and axillary nodes normal.   Neurologic: CNII-XII intact. Normal strength,  sensation and reflexes throughout.       Labs Reviewed:        Assessment/Plan     Principal Problem:    Aseptic loosening of prosthetic knee, initial encounter (HCC)  Resolved Problems:    * No resolved hospital problems. *      Right TK Revision

## 2024-06-24 NOTE — PERIOP NOTE
TRANSFER - IN REPORT:    Verbal report received from OR Nurse and CRNA on Adam May  being received from OR for routine post-op      Report consisted of patient's Situation, Background, Assessment and   Recommendations(SBAR).     Information from the following report(s) Nurse Handoff Report, Adult Overview, Surgery Report, Intake/Output, MAR, and Med Rec Status was reviewed with the receiving nurse.    Opportunity for questions and clarification was provided.      Assessment completed upon patient's arrival to unit and care assumed.

## 2024-06-24 NOTE — PROGRESS NOTES
TRANSFER - IN REPORT:    Verbal report received from DAVID Martinez on Adam May  being received from PACU for routine post-op      Report consisted of patient's Situation, Background, Assessment and   Recommendations(SBAR).     Information from the following report(s) Nurse Handoff Report was reviewed with the receiving nurse.    Opportunity for questions and clarification was provided.      Assessment completed upon patient's arrival to unit and care assumed.

## 2024-06-24 NOTE — PROGRESS NOTES
Physical Therapy Goals:  Initiated 6/24/2024 to be met within 7-10 days.  Short Term Goals  Short Term Goal 1: Patient will perform supine to/from sit with SBA  Short Term Goal 2: Patient will perform sit to/from stand with SB-CGA in preperation for gait progression  Short Term Goal 3: Patient will ambulate 100 ft with RW and SB-CGA to progress OOB mobility  Short Term Goal 4: Patient will negotiate 8 stairs with handrails and CGA to simulate home entry    []  Patient has met MD anh wilkes for d/c home   []  Recommend HH with 24 hour adult care   [x]  Benefit from additional acute PT session to address:  gait and stair training    PHYSICAL THERAPY EVALUATION    Patient: Adam May (64 y.o. male)  Date: 6/24/2024  Primary Diagnosis: Osteoarthritis of right knee, unspecified osteoarthritis type [M17.11]  Aseptic loosening of prosthetic knee, initial encounter ([facility]) [T84.038A, Z96.659]  Procedure(s) (LRB):  RIGHT TOTAL KNEE REVISION **INPATIENT STATUS DUE TO MEDICARE** (Right) Day of Surgery   Precautions: Fall Risk, Weight Bearing, Right Lower Extremity Weight Bearing: Weight Bearing As Tolerated  PLOF: Independent ambulation without AD    ASSESSMENT :  Based on the objective data described below, the patient presents with lower extremity weakness, decreased gait quality and endurance, impaired bed mobility and transfers, decreased AROM/flexibility, and overall limitations in functional mobility s/p surgery noted above. Pt performed sit to stand with contact guard assist. Patient ambulated 30 feet with RW, GB applied, contact guard assist. Pt educated on icing, elevation, positioning, home safety, home exercise program, and activity recommendations. Home health physical therapy is recommended upon discharge from hospital. Will continue gait and stair training next session.    DEFICITS/IMPAIRMENTS:    , Body Structures, Functions, Activity Limitations Requiring Skilled Therapeutic Intervention:  Laterality Date    COLONOSCOPY  2018    CORONARY ANGIOPLASTY WITH STENT PLACEMENT  2017    Dr. Conn    ENDOSCOPY, COLON, DIAGNOSTIC  2018    KNEE ARTHROPLASTY Right 06/25/2018    KNEE SURGERY Right 2018    wound infection     Barriers to Learning/Limitations: none  Compensate with: visual, verbal, tactile, kinesthetic cues/model  Home Situation:  Social/Functional History  Lives With: Spouse  Type of Home: House  Home Layout: Two level  Home Access: Stairs to enter with rails  Entrance Stairs - Number of Steps: 8  Entrance Stairs - Rails: Both  Home Equipment: Walker - Rolling, Cane (lift chair)  Strength:    Strength: Generally decreased, functional  Tone & Sensation:   Tone: Normal  Sensation: Impaired  Range Of Motion:  AROM: Generally decreased, functional  PROM: Generally decreased, functional  Functional Mobility:  Bed Mobility:   Bed Mobility Training  Bed Mobility Training: Yes  Supine to Sit: Contact-guard assistance  Transfers:   Transfer Training  Transfer Training: Yes  Interventions: Tactile cues;Verbal cues;Demonstration  Sit to Stand: Contact-guard assistance  Stand to Sit: Contact-guard assistance  Balance:   Balance  Sitting: Intact  Standing: Impaired  Standing - Static: Fair  Standing - Dynamic: Fair  Ambulation/Gait Training:  Gait Training  Right Side Weight Bearing: As tolerated  Gait  Gait Training: Yes  Right Side Weight Bearing: As tolerated  Overall Level of Assistance: Contact-guard assistance  Distance (ft): 30 Feet  Assistive Device: Gait belt;Walker, rolling  Interventions: Demonstration;Verbal cues  Base of Support: Shift to left  Speed/Stella: Slow  Step Length: Left shortened;Right shortened  Stance: Time;Right decreased  Gait Abnormalities: Antalgic;Decreased step clearance;Step to gait  Pain:  Pain level pre-treatment: 0/10   Pain level post-treatment: 0/10   Pain Intervention(s): Medication (see MAR); Rest, Ice, Repositioning  Response to intervention: Nurse notified, See doc

## 2024-06-24 NOTE — PERIOP NOTE
Reviewed PTA medication list with patient/caregiver and patient/caregiver denies any additional medications.     Patient admits to having a responsible adult care for them at home for at least 24 hours after surgery.    Patient encouraged to use gown warming system and informed that using said warming gown to regulate body temperature prior to a procedure has been shown to help reduce the risks of blood clots and infection.    Patient's pharmacy of choice verified and documented in PTA medication section.    Dual skin assessment & fall risk band verification completed with Savanah ROTH RN.

## 2024-06-24 NOTE — ANESTHESIA POSTPROCEDURE EVALUATION
Post-Anesthesia Evaluation & Assessment    Visit Vitals  /87   Pulse 68   Temp 97.6 °F (36.4 °C) (Oral)   Resp 11   Ht 1.803 m (5' 11\")   Wt 84 kg (185 lb 1.6 oz)   SpO2 98%   PF (!) 14 L/min   BMI 25.82 kg/m²       Nausea/Vomiting: no nausea    Post-operative hydration adequate.    Pain score (VAS): 0    Mental status & Level of consciousness: alert and oriented x 3    Neurological status: moves all extremities, sensation grossly intact    Pulmonary status: airway patent, no supplemental oxygen required    Complications related to anesthesia: none    Additional comments:

## 2024-06-25 VITALS
OXYGEN SATURATION: 100 % | TEMPERATURE: 97 F | BODY MASS INDEX: 25.91 KG/M2 | HEART RATE: 72 BPM | DIASTOLIC BLOOD PRESSURE: 86 MMHG | RESPIRATION RATE: 16 BRPM | HEIGHT: 71 IN | WEIGHT: 185.1 LBS | SYSTOLIC BLOOD PRESSURE: 150 MMHG

## 2024-06-25 PROBLEM — T84.038A ASEPTIC LOOSENING OF PROSTHETIC KNEE, INITIAL ENCOUNTER (HCC): Status: RESOLVED | Noted: 2024-06-24 | Resolved: 2024-06-25

## 2024-06-25 PROBLEM — Z96.659 ASEPTIC LOOSENING OF PROSTHETIC KNEE, INITIAL ENCOUNTER (HCC): Status: RESOLVED | Noted: 2024-06-24 | Resolved: 2024-06-25

## 2024-06-25 LAB
ABO + RH BLD: NORMAL
ANION GAP SERPL CALC-SCNC: 6 MMOL/L (ref 3–18)
BLOOD GROUP ANTIBODIES SERPL: NORMAL
BUN SERPL-MCNC: 13 MG/DL (ref 7–18)
BUN/CREAT SERPL: 13 (ref 12–20)
CALCIUM SERPL-MCNC: 7.9 MG/DL (ref 8.5–10.1)
CHLORIDE SERPL-SCNC: 108 MMOL/L (ref 100–111)
CO2 SERPL-SCNC: 25 MMOL/L (ref 21–32)
CREAT SERPL-MCNC: 1.03 MG/DL (ref 0.6–1.3)
ERYTHROCYTE [DISTWIDTH] IN BLOOD BY AUTOMATED COUNT: 16.6 % (ref 11.6–14.5)
GLUCOSE SERPL-MCNC: 159 MG/DL (ref 74–99)
HCT VFR BLD AUTO: 32.9 % (ref 36–48)
HGB BLD-MCNC: 10.4 G/DL (ref 13–16)
MCH RBC QN AUTO: 24.2 PG (ref 24–34)
MCHC RBC AUTO-ENTMCNC: 31.6 G/DL (ref 31–37)
MCV RBC AUTO: 76.7 FL (ref 78–100)
NRBC # BLD: 0 K/UL (ref 0–0.01)
NRBC BLD-RTO: 0 PER 100 WBC
PLATELET # BLD AUTO: 190 K/UL (ref 135–420)
PMV BLD AUTO: 12.2 FL (ref 9.2–11.8)
POTASSIUM SERPL-SCNC: 4.2 MMOL/L (ref 3.5–5.5)
RBC # BLD AUTO: 4.29 M/UL (ref 4.35–5.65)
SODIUM SERPL-SCNC: 139 MMOL/L (ref 136–145)
SPECIMEN EXP DATE BLD: NORMAL
WBC # BLD AUTO: 12.9 K/UL (ref 4.6–13.2)

## 2024-06-25 PROCEDURE — 6370000000 HC RX 637 (ALT 250 FOR IP): Performed by: ORTHOPAEDIC SURGERY

## 2024-06-25 PROCEDURE — 6360000002 HC RX W HCPCS: Performed by: ORTHOPAEDIC SURGERY

## 2024-06-25 PROCEDURE — 80048 BASIC METABOLIC PNL TOTAL CA: CPT

## 2024-06-25 PROCEDURE — 85027 COMPLETE CBC AUTOMATED: CPT

## 2024-06-25 PROCEDURE — 97165 OT EVAL LOW COMPLEX 30 MIN: CPT

## 2024-06-25 PROCEDURE — 97535 SELF CARE MNGMENT TRAINING: CPT

## 2024-06-25 PROCEDURE — 2580000003 HC RX 258: Performed by: ORTHOPAEDIC SURGERY

## 2024-06-25 PROCEDURE — 97116 GAIT TRAINING THERAPY: CPT

## 2024-06-25 PROCEDURE — 36415 COLL VENOUS BLD VENIPUNCTURE: CPT

## 2024-06-25 RX ORDER — OXYCODONE HYDROCHLORIDE 5 MG/1
5 TABLET ORAL EVERY 6 HOURS PRN
Status: DISCONTINUED | OUTPATIENT
Start: 2024-06-25 | End: 2024-06-25 | Stop reason: HOSPADM

## 2024-06-25 RX ORDER — DEXTROSE MONOHYDRATE 100 MG/ML
INJECTION, SOLUTION INTRAVENOUS CONTINUOUS PRN
Status: DISCONTINUED | OUTPATIENT
Start: 2024-06-25 | End: 2024-06-25 | Stop reason: HOSPADM

## 2024-06-25 RX ORDER — DIPHENHYDRAMINE HCL 25 MG
25 TABLET ORAL EVERY 6 HOURS PRN
Qty: 30 TABLET | Refills: 0 | Status: SHIPPED | OUTPATIENT
Start: 2024-06-25 | End: 2024-07-25

## 2024-06-25 RX ORDER — ACETAMINOPHEN 500 MG
500 TABLET ORAL EVERY 6 HOURS
Qty: 120 TABLET | Refills: 0 | Status: SHIPPED | OUTPATIENT
Start: 2024-06-25

## 2024-06-25 RX ORDER — TRAMADOL HYDROCHLORIDE 50 MG/1
50 TABLET ORAL EVERY 6 HOURS
Qty: 56 TABLET | Refills: 0 | Status: SHIPPED | OUTPATIENT
Start: 2024-06-25 | End: 2024-07-09

## 2024-06-25 RX ORDER — INSULIN LISPRO 100 [IU]/ML
0-8 INJECTION, SOLUTION INTRAVENOUS; SUBCUTANEOUS ONCE
Status: DISCONTINUED | OUTPATIENT
Start: 2024-06-25 | End: 2024-06-25 | Stop reason: HOSPADM

## 2024-06-25 RX ORDER — OXYCODONE HYDROCHLORIDE 5 MG/1
5 TABLET ORAL EVERY 6 HOURS PRN
Qty: 24 TABLET | Refills: 0 | Status: SHIPPED | OUTPATIENT
Start: 2024-06-25 | End: 2024-07-02

## 2024-06-25 RX ADMIN — FAMOTIDINE 20 MG: 20 TABLET, FILM COATED ORAL at 08:24

## 2024-06-25 RX ADMIN — SODIUM CHLORIDE: 9 INJECTION, SOLUTION INTRAVENOUS at 00:56

## 2024-06-25 RX ADMIN — SODIUM CHLORIDE: 9 INJECTION, SOLUTION INTRAVENOUS at 08:55

## 2024-06-25 RX ADMIN — WATER 2000 MG: 1 INJECTION INTRAMUSCULAR; INTRAVENOUS; SUBCUTANEOUS at 04:13

## 2024-06-25 RX ADMIN — MORPHINE SULFATE 4 MG: 4 INJECTION, SOLUTION INTRAMUSCULAR; INTRAVENOUS at 07:28

## 2024-06-25 RX ADMIN — MORPHINE SULFATE 4 MG: 4 INJECTION, SOLUTION INTRAMUSCULAR; INTRAVENOUS at 04:12

## 2024-06-25 RX ADMIN — SODIUM CHLORIDE: 9 INJECTION, SOLUTION INTRAVENOUS at 07:31

## 2024-06-25 RX ADMIN — ACETAMINOPHEN 650 MG: 325 TABLET ORAL at 04:12

## 2024-06-25 RX ADMIN — OXYCODONE HYDROCHLORIDE 5 MG: 5 TABLET ORAL at 11:40

## 2024-06-25 RX ADMIN — GABAPENTIN 300 MG: 300 CAPSULE ORAL at 08:24

## 2024-06-25 ASSESSMENT — PAIN SCALES - GENERAL
PAINLEVEL_OUTOF10: 10
PAINLEVEL_OUTOF10: 10
PAINLEVEL_OUTOF10: 7
PAINLEVEL_OUTOF10: 6
PAINLEVEL_OUTOF10: 7
PAINLEVEL_OUTOF10: 10

## 2024-06-25 ASSESSMENT — PAIN DESCRIPTION - DESCRIPTORS
DESCRIPTORS: THROBBING
DESCRIPTORS: ACHING;THROBBING
DESCRIPTORS: THROBBING

## 2024-06-25 ASSESSMENT — PAIN DESCRIPTION - ORIENTATION
ORIENTATION: RIGHT

## 2024-06-25 ASSESSMENT — PAIN DESCRIPTION - LOCATION
LOCATION: KNEE

## 2024-06-25 ASSESSMENT — PAIN DESCRIPTION - PAIN TYPE
TYPE: SURGICAL PAIN

## 2024-06-25 ASSESSMENT — PAIN - FUNCTIONAL ASSESSMENT
PAIN_FUNCTIONAL_ASSESSMENT: ACTIVITIES ARE NOT PREVENTED
PAIN_FUNCTIONAL_ASSESSMENT: PREVENTS OR INTERFERES SOME ACTIVE ACTIVITIES AND ADLS
PAIN_FUNCTIONAL_ASSESSMENT: ACTIVITIES ARE NOT PREVENTED

## 2024-06-25 ASSESSMENT — PAIN DESCRIPTION - ONSET
ONSET: ON-GOING

## 2024-06-25 ASSESSMENT — PAIN DESCRIPTION - FREQUENCY
FREQUENCY: CONTINUOUS
FREQUENCY: INTERMITTENT
FREQUENCY: CONTINUOUS

## 2024-06-25 NOTE — DISCHARGE INSTRUCTIONS
Dr. Shay's Post Operative Instructions Total Knee Replacement    ACTIVITIES :  1. You may be up and walking about the house with your walker.  2.  Activities around the house, such as washing dishes, fixing light meals, and your own personal care are fine.   3.  Avoid strenuous activities, such as vacuuming, lifting laundry or grocery bags.   4.  Walking is the best way to rebuild strength and stamina. Start SLOWLY and gradually increase your distance.  5.  Avoid any jogging, running or excessive stair-climbing   6.  Your home physical therapist will work with you and your range-of-motion for the first 7-14 days.  After your first visit with Dr. Shay you will be scheduled for out-patient physical therapy at a site convenient for you.  7.  Follow-up with Dr. Shay in 10-14 days.    BATHING and INCISION CARE:  1. Do not remove bandage until first post-op visit in office  2. The incision may be tender to touch or feel numb: this is normal.   3.  Keep the incision clean and dry “no showering” until your follow-up appointment. The incision will be closed with sutures under the skin and the skin will be glued.   4.  Do not apply any lotions, ointments or oils on the incision.   5.  If you notice any excessive swelling, redness, or persistent drainage around the incision, notify the office immediately.    DRIVIN.  You should not drive until after your follow-up appointment.   2.  You can be in a vehicle for short distances, but if you travel any long distance, please stop about every 30 minutes and walk/stretch.   3.  You should NEVER drive while taking narcotic medication.  4.  Driving will be permitted on right knee replacements after the therapist has confirmed a range-of-motion of a 105 degrees.  Left knee replacements may drive at 2 weeks post-op.    RETURN TO WORK :  1. The decision to return to work will be determined on an individual basis.   2.  Many people who have a

## 2024-06-25 NOTE — PROGRESS NOTES
0810  MD Shay on unit to round on patients. This RN requested PRN PO pain med for patient. Per MD Shay this RN to place order for oxycodone 5mg PO Q6HR PRN for moderate pain. V.O. RBV.     1422  Charting completed by DAVID Craven is reviewed and approved by this RN.

## 2024-06-25 NOTE — PROGRESS NOTES
Occupational Therapy Goals:  Initiated 6/25/2024 to be met within 7-10 days.  Short Term Goals  Time Frame for Short Term Goals: 7 days  Short Term Goal 1: Patient will complete toilet transfer with mod I  Short Term Goal 2: Patient will complete bathing with mod I  Short Term Goal 3: Patient will complete grooming in standing at sink with mod I  Short Term Goal 4: Patient will complete dressing with mod I  Short Term Goal 5: Patient will complete toileting with mod I    OCCUPATIONAL THERAPY EVALUATION    Patient: Adam May (64 y.o. male)  Date: 6/25/2024  Primary Diagnosis: Osteoarthritis of right knee, unspecified osteoarthritis type [M17.11]  Aseptic loosening of prosthetic knee, initial encounter ([facility]) [T84.038A, Z96.659]  Procedure(s) (LRB):  RIGHT TOTAL KNEE REVISION **INPATIENT STATUS DUE TO MEDICARE** (Right) 1 Day Post-Op   Precautions: Fall Risk, Weight Bearing, Right Lower Extremity Weight Bearing: Weight Bearing As Tolerated,  ,  ,  ,  ,  ,    PLOF: Patient completed ADLs independently and functional mobility independently     ASSESSMENT :  Patient presented supine in bed with gripper socks. Patient with no visitors present for entire session. Patient completed assessment of ADLs and BUE strength, ROM (see details below). Patient completed bathing supervision standing at bathroom sink. Patient was educated on adaptive dressing strategies, completed with SBA. Patient educated on toileting strategies, completed with supervision . Patient educated on icing, mobility schedule, verbalized understanding. Patient educated on bathing strategies, verbalized understanding. Patient educated on Roberto hose wear and care, verbalized understanding.   ,    Patient balance, mobility and decreased strength and ROM in RLE does impact ADL and functional mobility status.  Patient would benefit from HH at discharge to increase safety and independence with ADLs and functional mobility.

## 2024-06-25 NOTE — OP NOTE
[address]  [address]                            OPERATIVE REPORT      PATIENT NAME: THELMA MOSCOSO             : 1960  MED REC NO: [id number]                       ROOM: [facility]  ACCOUNT NO: [id number]                       ADMIT DATE: 2024  PROVIDER: Siddhartha Shay MD    DATE OF SERVICE:  2024    PREOPERATIVE DIAGNOSES:  Aseptic loosening, right knee.    POSTOPERATIVE DIAGNOSES:  same    PROCEDURES PERFORMED:  Right knee revision.    SURGEON:  Siddhartha Shay MD    ASSISTANT:  There were no assistants.    ANESTHESIA:  General with supplemental adductor canal block.    ESTIMATED BLOOD LOSS:  25 mL.    SPECIMENS REMOVED:  None.    INTRAOPERATIVE FINDINGS:  Aseptic loosening of tibial component, right knee.     COMPLICATIONS:  None.    IMPLANTS:  DePuy right revision knee.    INDICATIONS:  A 64-year-old male who underwent a right knee replacement in 2016 developed a vascular necrosis proximally and appeared to have subsidence of the tibial component.  He goes to surgery for removal of the implant and conversion over to a revision knee.    DESCRIPTION OF PROCEDURE:  The patient was brought to the operating room after receiving a block and antibiotics in the OR.  General anesthesia was administered.  A tourniquet was applied to the thigh.  Right lower extremity was prepped and draped sterilely.  After exsanguination, tourniquet inflated to 350 mmHg.  We excised the old scar.  We made a midline incision.  Flaps were developed.  Medial arthrotomy was performed.  The patella was intact and this was left alone.  We utilized osteotomes and easily removed the distal femoral component with minimal bone loss and the tibial component.  The canals were then reamed to 14 mm on both the tibia and femur.  At this point, broaches were placed.  We put new box cut on the end of the femur cutting it for a size 3.  The tibia was then cut

## 2024-06-25 NOTE — PROGRESS NOTES
AVS reviewed with pt, all questions answered. Pt educated on medication uses and side effects and physician discharge instructions. Pt verbalized understanding. IV removed, no complications. Pt in bed, will call when ride comes. Pt needs to go to pharmacy before leaving.

## 2024-06-25 NOTE — PROGRESS NOTES
0726-  Handoff report received from DAVID Erickson, to receiving nurse DAVID Craven    0821-  Patient is A/Ox4, pain assessment was a 7/10 after receiving PRN pain medication from off-going nurse at 0728. Patient's surgical dressing is C/D/I, ace wrap was removed and a opti-foam dressing applied to the surgical site, site is C/D/I. Ice was applied to right knee for pain management.  Patient has on RENE stockings thigh high bilaterally. Patient's output of clear, yellow, urine of 575 ml in urinal. Call bell is within reach, bed is in the lowest position, bedside table is within reach.    1140-  Patient finished session with PT and was administered prn pain medication  per MAR for his pain 10/10 for the right knee. Patient is using ice for non-pharm pain management. Patient's bed is in the lowest position, call bell is within reach, bedside table and possessions are within reach. Safety measures are intact.     1240- Patient's pain reassessed pain is 6/10 patient is semi-fowlers with ice applied to the right knee. Call bell is within reach, bed is in the lowest position, safety measures are intact.

## 2024-06-25 NOTE — DISCHARGE SUMMARY
Discharge Summary    Patient: Adam May               Sex: male          DOA: 6/24/2024         YOB: 1960      Age:  64 y.o.        LOS:  LOS: 1 day                Admit Date: 6/24/2024    Discharge Date: 6/25/2024    Admission Diagnoses: Osteoarthritis of right knee, unspecified osteoarthritis type [M17.11]  Aseptic loosening of prosthetic knee, initial encounter ([facility]) [T84.038A, Z96.659]    Discharge Diagnoses:      Discharge Condition: Good    Hospital Course: Revision TKR    Consults: None    Significant Diagnostic Studies: none    Discharge Medications:     Current Discharge Medication List        START taking these medications    Details   acetaminophen (TYLENOL) 500 MG tablet Take 1 tablet by mouth every 6 hours  Qty: 120 tablet, Refills: 0      traMADol (ULTRAM) 50 MG tablet Take 1 tablet by mouth every 6 hours for 14 days. Intended supply: 7 days. Take lowest dose possible to manage pain Max Daily Amount: 200 mg  Qty: 56 tablet, Refills: 0    Comments: Reduce doses taken as pain becomes manageable  Associated Diagnoses: Aseptic loosening of prosthetic knee, initial encounter ([facility])      diphenhydrAMINE (BENADRYL ALLERGY) 25 MG tablet Take 1 tablet by mouth every 6 hours as needed for Itching  Qty: 30 tablet, Refills: 0      oxyCODONE (ROXICODONE) 5 MG immediate release tablet Take 1 tablet by mouth every 6 hours as needed for Pain for up to 7 days. Intended supply: 5 days. Take lowest dose possible to manage pain Max Daily Amount: 20 mg  Qty: 24 tablet, Refills: 0    Comments: Reduce doses taken as pain becomes manageable  Associated Diagnoses: Aseptic loosening of prosthetic knee, initial encounter ([facility])           CONTINUE these medications which have NOT CHANGED    Details   clopidogrel (PLAVIX) 75 MG tablet Take 1 tablet by mouth nightly Indications: Cardiac stent      famotidine (PEPCID) 20 MG tablet Take 1 tablet by mouth 2 times daily Indications: GERD      meloxicam (MOBIC) 15

## 2024-06-25 NOTE — PROGRESS NOTES
Physical Therapy Goals:  Initiated 6/25/2024 to be met within 7-10 days.  Short Term Goals  Short Term Goal 1: Patient will perform supine to/from sit with SBA  Short Term Goal 2: Patient will perform sit to/from stand with SB-CGA in preperation for gait progression  Short Term Goal 3: Patient will ambulate 100 ft with RW and SB-CGA to progress OOB mobility  Short Term Goal 4: Patient will negotiate 8 stairs with handrails and CGA to simulate home entry    [x]  Patient has met MD anh wilkes for d/c home   []  Recommend HH with 24 hour adult care   []  Benefit from additional acute PT session to address:        PHYSICAL THERAPY TREATMENT    Patient: Adam May (64 y.o. male)  Date: 6/25/2024  Diagnosis: Osteoarthritis of right knee, unspecified osteoarthritis type [M17.11]  Aseptic loosening of prosthetic knee, initial encounter ([facility]) [T84.038A, Z96.659] Aseptic loosening of prosthetic knee, initial encounter ([facility])  Procedure(s) (LRB):  RIGHT TOTAL KNEE REVISION **INPATIENT STATUS DUE TO MEDICARE** (Right) 1 Day Post-Op  Precautions: Fall Risk, Weight Bearing, Right Lower Extremity Weight Bearing: Weight Bearing As Tolerated,  ,  ,  ,  ,  ,    PLOF: has a RW    ASSESSMENT:  Assessment  Assessment: Pt sitting EOB upon arrival and immediately stood up upon arrival.  Ambulated to the bathroom to void.  Cell phone ringing upon exiting room and pt eager to answer so began quickly hopping on the LLE.  Ambulated 170ft total with RW, step to gt pattern, no LOB or knee buckling.  Negotiated 3 steps holding (B) hand rails.  Returned to room and perseverating on his cell phones, decreased attention to education and safety.  Activity Tolerance: Patient tolerated treatment well  Discharge Recommendations: Home with Home health PT    Progression toward goals:   [x]      Improving appropriately and progressing toward goals  []      Improving slowly and progressing toward goals  []      Not making progress toward  apparent distress sitting up in chair  [x] Patient left in no apparent distress in bed  [x] Call bell left within reach  [x] Nursing notified  [] Caregiver present  [] Bed alarm activated  [] SCDs applied      COMMUNICATION/EDUCATION:   Patient Education  Education Given To: Patient  Education Provided: Role of Therapy;Plan of Care;Equipment  Education Method: Verbal;Teach Back  Barriers to Learning: Other (Comment) (perseverating on his cell phone, decreased attention)      [facility] PTA  Minutes: 15    [facility] AM-PAC® Basic Mobility Inpatient Short Form (6-Clicks) Version 2    How much HELP from another person does the patient currently need…    (If the patient hasn't done an activity recently, how much help from another person do you think he/she would need if he/she tried?)   Total (Total A or Dep)   A Lot  (Mod to Max A)   A Little (Sup or Min A)   None (Mod I to I)   Turning from your back to your side while in a flat bed without using bedrails?   [] 1 [] 2 [x] 3 [] 4   2. Moving from lying on your back to sitting on the side of a flat bed without using bedrails?    [] 1 [] 2 [x] 3 [] 4   3. Moving to and from a bed to a chair (including a wheelchair)?   [] 1 [] 2 [x] 3 [] 4   4. Standing up from a chair using your arms (e.g., wheelchair, or bedside chair)?   [] 1 [] 2 [x] 3 [] 4   5. Walking in hospital room?   [] 1 [] 2 [x] 3 [] 4   6. Climbing 3-5 steps with a railing?+   [] 1 [] 2 [x] 3 [] 4   +If stair climbing cannot be assessed, skip item #6.  Sum responses from items 1-5.     Current research shows that an AM-PAC score of 18 (14 without stairs) or greater is associated with a discharge to the patient's home setting. Based on an AM-PAC score of 18/24 (or **/20 if omitting stairs) and their current functional mobility deficits, it is recommended that the patient have 2-3 sessions per week of Physical Therapy at d/c to increase the patient's independence.

## 2024-06-25 NOTE — PROGRESS NOTES
1925- Bedside and Verbal shift change report given to DAVID Cheema (oncoming nurse) by DAVID Oneal (offgoing nurse). Report included the following information Nurse Handoff Report, Adult Overview, Surgery Report, Intake/Output, MAR, and Recent Results.    2038 - Patient A&Ox4, RA. Denies chest pain and SOB. With ace wrap dressing to R knee C/D/I.  Denies numbness/tingling/calf pain.  Pain 10/10 with a tolerable level of 5/10. With compression device bilaterally. Pt educated on unit routine, IS use, q2h rounds, and pain management. Pt verbalized understanding, no concerns voiced. Call bell and telephone within reach, bed in lowest position. Pt encouraged to call for assistance via call bell/zone phone.       0539- Pt ambulated to the hallway with steady gait.    0725- Bedside and Verbal shift change report given to DAVID Craven (oncoming nurse) by DAVID Cheema (offgoing nurse). Report included the following information Nurse Handoff Report, Adult Overview, Surgery Report, Intake/Output, MAR, and Recent Results.

## 2024-06-25 NOTE — CARE COORDINATION
Discharge Plan: Home with HH      CM NW met with patient at bedside. Pt is s/p Right knee revision. [facility] preference [facility], pt has a rolling walker. Pt has family will provide transport at discharge. Pt has a follow up with Dr. Shay on July 9th. Pts has post op pain medication prescribed. No discharge needs identified, CM available if discharge needs arise.

## 2024-06-25 NOTE — PLAN OF CARE
Problem: Chronic Conditions and Co-morbidities  Goal: Patient's chronic conditions and co-morbidity symptoms are monitored and maintained or improved  Outcome: Progressing     Problem: Safety - Adult  Goal: Free from fall injury  Outcome: Progressing     Problem: Pain  Goal: Verbalizes/displays adequate comfort level or baseline comfort level  Outcome: Progressing     Problem: ABCDS Injury Assessment  Goal: Absence of physical injury  Outcome: Progressing

## 2024-06-25 NOTE — PLAN OF CARE
Problem: Chronic Conditions and Co-morbidities  Goal: Patient's chronic conditions and co-morbidity symptoms are monitored and maintained or improved  6/25/2024 1220 by Dahlia Levy RN  Outcome: Adequate for Discharge  6/25/2024 0754 by Anette Acuña RN  Outcome: Progressing     Problem: Safety - Adult  Goal: Free from fall injury  6/25/2024 1220 by Dahlia Levy RN  Outcome: Adequate for Discharge  6/25/2024 0754 by Anette Acuña RN  Outcome: Progressing     Problem: Pain  Goal: Verbalizes/displays adequate comfort level or baseline comfort level  6/25/2024 1220 by Dahlia Levy RN  Outcome: Adequate for Discharge  6/25/2024 0754 by Anette Acuña RN  Outcome: Progressing     Problem: ABCDS Injury Assessment  Goal: Absence of physical injury  6/25/2024 1220 by Dahlia Levy RN  Outcome: Adequate for Discharge  6/25/2024 0754 by Anette Acuña RN  Outcome: Progressing

## 2024-07-30 RX ORDER — OXYCODONE HYDROCHLORIDE 5 MG/1
5 TABLET ORAL EVERY 6 HOURS PRN
Status: ON HOLD | COMMUNITY
End: 2024-08-02 | Stop reason: HOSPADM

## 2024-07-30 NOTE — PERIOP NOTE
Posting slip faxed back to surgeon's office- contained abbreviations in the consent. Requested that is be refaxed to posting as an update.

## 2024-07-30 NOTE — PERIOP NOTE
PAT Activity Status Questionnaire       Yes No Points for YES    Are you able to climb a flight of stairs or walk up a hill? [x] [] 1   Are you able to do heavy work around the house like lifting or moving heavy furniture? [x] [] 1   Do yardwork like raking leaves, weeding or pushing a power mower? [x] [] 1   Participate in strenuous sports like swimming, singles tennis, football, basketball or skiing? [x] [] 1   Total Score:        4       If TOTAL SCORE is <3, send chart for anesthesia review.    SURGICAL PRE-ADMISSION INSTRUCTIONS    Your Medication instructions:      Take these medications the morning of surgery with a sip of water: (famotidine. Oxycodone if needed)    Do Not Take morning of surgery: (Plavix- last dose 7-)- late add on.     Follow surgeon's instructions for stopping NSAIDs (i.e. Ibuprofen/Motrin, Advil, Aleve/Naproxen, Voltaren cream or tablet, Mobic/Meloxicam, Excedrin, Celebrex, etc). If no instructions provided stop NSAIDs 7 days prior to surgery.    Stop supplements and vitamins (except multivitamins) 2 weeks prior to  surgery.  General Day Surgery Instructions    Do NOT eat or drink anything, including water, ice chips, soda, candy, or gum after MIDNIGHT on the night before surgery, unless you have specific instructions from your Surgeon or Anesthesia Provider to do so.   Please shower with antibacterial bar soap tonight and 2 hours prior to arrival on day of surgery.  No smoking tobacco products 24 hours before surgery.  No recreational drugs for one week prior to the day of surgery- last joint 7-.  Remove all jewelry, nail polish, makeup (including mascara); no lotions, powders, deodorant, or perfume/cologne/after shave.  Leave all valuables, including money/purse/wallet, jewelry, laptop, etc.. at home.  Bring durable medical equipment (DME) needed: (walker or cane)   Glasses/Contact lenses, Hearing aids, and Dentures may be worn to the hospital.  They will be removed prior

## 2024-07-31 ENCOUNTER — HOSPITAL ENCOUNTER (INPATIENT)
Facility: HOSPITAL | Age: 64
LOS: 3 days | Discharge: HOME HEALTH | End: 2024-08-03
Attending: ORTHOPAEDIC SURGERY | Admitting: ORTHOPAEDIC SURGERY
Payer: MEDICARE

## 2024-07-31 ENCOUNTER — ANESTHESIA (OUTPATIENT)
Facility: HOSPITAL | Age: 64
End: 2024-07-31
Payer: MEDICARE

## 2024-07-31 ENCOUNTER — ANESTHESIA EVENT (OUTPATIENT)
Facility: HOSPITAL | Age: 64
End: 2024-07-31
Payer: MEDICARE

## 2024-07-31 DIAGNOSIS — K68.11: ICD-10-CM

## 2024-07-31 DIAGNOSIS — T81.49XA POSTOPERATIVE WOUND INFECTION: Primary | ICD-10-CM

## 2024-07-31 LAB
ALBUMIN SERPL-MCNC: 2.9 G/DL (ref 3.4–5)
ALBUMIN/GLOB SERPL: 0.6 (ref 0.8–1.7)
ALP SERPL-CCNC: 106 U/L (ref 45–117)
ALT SERPL-CCNC: 22 U/L (ref 16–61)
ANION GAP SERPL CALC-SCNC: 4 MMOL/L (ref 3–18)
AST SERPL-CCNC: 12 U/L (ref 10–38)
BILIRUB SERPL-MCNC: 0.4 MG/DL (ref 0.2–1)
BUN SERPL-MCNC: 10 MG/DL (ref 7–18)
BUN/CREAT SERPL: 9 (ref 12–20)
CALCIUM SERPL-MCNC: 8.8 MG/DL (ref 8.5–10.1)
CHLORIDE SERPL-SCNC: 104 MMOL/L (ref 100–111)
CO2 SERPL-SCNC: 28 MMOL/L (ref 21–32)
CREAT SERPL-MCNC: 1.13 MG/DL (ref 0.6–1.3)
GLOBULIN SER CALC-MCNC: 4.5 G/DL (ref 2–4)
GLUCOSE BLD STRIP.AUTO-MCNC: 136 MG/DL (ref 70–110)
GLUCOSE SERPL-MCNC: 124 MG/DL (ref 74–99)
POTASSIUM SERPL-SCNC: 4.4 MMOL/L (ref 3.5–5.5)
PROT SERPL-MCNC: 7.4 G/DL (ref 6.4–8.2)
SODIUM SERPL-SCNC: 136 MMOL/L (ref 136–145)

## 2024-07-31 PROCEDURE — 7100000001 HC PACU RECOVERY - ADDTL 15 MIN: Performed by: ORTHOPAEDIC SURGERY

## 2024-07-31 PROCEDURE — 6360000002 HC RX W HCPCS: Performed by: ORTHOPAEDIC SURGERY

## 2024-07-31 PROCEDURE — 2580000003 HC RX 258: Performed by: ORTHOPAEDIC SURGERY

## 2024-07-31 PROCEDURE — 0SUV09Z SUPPLEMENT RIGHT KNEE JOINT, TIBIAL SURFACE WITH LINER, OPEN APPROACH: ICD-10-PCS | Performed by: ORTHOPAEDIC SURGERY

## 2024-07-31 PROCEDURE — 3600000012 HC SURGERY LEVEL 2 ADDTL 15MIN: Performed by: ORTHOPAEDIC SURGERY

## 2024-07-31 PROCEDURE — 3600000002 HC SURGERY LEVEL 2 BASE: Performed by: ORTHOPAEDIC SURGERY

## 2024-07-31 PROCEDURE — 82962 GLUCOSE BLOOD TEST: CPT

## 2024-07-31 PROCEDURE — C1776 JOINT DEVICE (IMPLANTABLE): HCPCS | Performed by: ORTHOPAEDIC SURGERY

## 2024-07-31 PROCEDURE — 6360000002 HC RX W HCPCS: Performed by: ANESTHESIOLOGY

## 2024-07-31 PROCEDURE — 7100000000 HC PACU RECOVERY - FIRST 15 MIN: Performed by: ORTHOPAEDIC SURGERY

## 2024-07-31 PROCEDURE — 2580000003 HC RX 258: Performed by: ANESTHESIOLOGY

## 2024-07-31 PROCEDURE — 3700000001 HC ADD 15 MINUTES (ANESTHESIA): Performed by: ORTHOPAEDIC SURGERY

## 2024-07-31 PROCEDURE — 87070 CULTURE OTHR SPECIMN AEROBIC: CPT

## 2024-07-31 PROCEDURE — 0SPC09Z REMOVAL OF LINER FROM RIGHT KNEE JOINT, OPEN APPROACH: ICD-10-PCS | Performed by: ORTHOPAEDIC SURGERY

## 2024-07-31 PROCEDURE — 2709999900 HC NON-CHARGEABLE SUPPLY: Performed by: ORTHOPAEDIC SURGERY

## 2024-07-31 PROCEDURE — 2580000003 HC RX 258: Performed by: INTERNAL MEDICINE

## 2024-07-31 PROCEDURE — 6360000002 HC RX W HCPCS: Performed by: INTERNAL MEDICINE

## 2024-07-31 PROCEDURE — 6370000000 HC RX 637 (ALT 250 FOR IP): Performed by: ORTHOPAEDIC SURGERY

## 2024-07-31 PROCEDURE — 80053 COMPREHEN METABOLIC PANEL: CPT

## 2024-07-31 PROCEDURE — 0JDN0ZZ EXTRACTION OF RIGHT LOWER LEG SUBCUTANEOUS TISSUE AND FASCIA, OPEN APPROACH: ICD-10-PCS | Performed by: ORTHOPAEDIC SURGERY

## 2024-07-31 PROCEDURE — 87075 CULTR BACTERIA EXCEPT BLOOD: CPT

## 2024-07-31 PROCEDURE — 3700000000 HC ANESTHESIA ATTENDED CARE: Performed by: ORTHOPAEDIC SURGERY

## 2024-07-31 PROCEDURE — 87102 FUNGUS ISOLATION CULTURE: CPT

## 2024-07-31 PROCEDURE — A4217 STERILE WATER/SALINE, 500 ML: HCPCS | Performed by: ORTHOPAEDIC SURGERY

## 2024-07-31 PROCEDURE — 1100000000 HC RM PRIVATE

## 2024-07-31 PROCEDURE — L1830 KO IMMOB CANVAS LONG PRE OTS: HCPCS | Performed by: ORTHOPAEDIC SURGERY

## 2024-07-31 PROCEDURE — 36415 COLL VENOUS BLD VENIPUNCTURE: CPT

## 2024-07-31 PROCEDURE — 6360000002 HC RX W HCPCS: Performed by: NURSE ANESTHETIST, CERTIFIED REGISTERED

## 2024-07-31 PROCEDURE — 87205 SMEAR GRAM STAIN: CPT

## 2024-07-31 PROCEDURE — 2500000003 HC RX 250 WO HCPCS: Performed by: NURSE ANESTHETIST, CERTIFIED REGISTERED

## 2024-07-31 DEVICE — INSERT TIB SZ 3 THK10MM KNEE GVF POLYETH ROT PLATFRM PFC: Type: IMPLANTABLE DEVICE | Site: KNEE | Status: FUNCTIONAL

## 2024-07-31 RX ORDER — MIDAZOLAM HYDROCHLORIDE 1 MG/ML
INJECTION INTRAMUSCULAR; INTRAVENOUS PRN
Status: DISCONTINUED | OUTPATIENT
Start: 2024-07-31 | End: 2024-07-31 | Stop reason: SDUPTHER

## 2024-07-31 RX ORDER — LABETALOL HYDROCHLORIDE 5 MG/ML
10 INJECTION, SOLUTION INTRAVENOUS
Status: DISCONTINUED | OUTPATIENT
Start: 2024-07-31 | End: 2024-07-31 | Stop reason: HOSPADM

## 2024-07-31 RX ORDER — FENTANYL CITRATE 50 UG/ML
INJECTION, SOLUTION INTRAMUSCULAR; INTRAVENOUS PRN
Status: DISCONTINUED | OUTPATIENT
Start: 2024-07-31 | End: 2024-07-31 | Stop reason: SDUPTHER

## 2024-07-31 RX ORDER — SODIUM CHLORIDE, SODIUM LACTATE, POTASSIUM CHLORIDE, CALCIUM CHLORIDE 600; 310; 30; 20 MG/100ML; MG/100ML; MG/100ML; MG/100ML
INJECTION, SOLUTION INTRAVENOUS CONTINUOUS
Status: DISCONTINUED | OUTPATIENT
Start: 2024-07-31 | End: 2024-07-31

## 2024-07-31 RX ORDER — MORPHINE SULFATE 2 MG/ML
2 INJECTION, SOLUTION INTRAMUSCULAR; INTRAVENOUS
Status: DISCONTINUED | OUTPATIENT
Start: 2024-07-31 | End: 2024-08-03 | Stop reason: HOSPADM

## 2024-07-31 RX ORDER — HYDROMORPHONE HYDROCHLORIDE 1 MG/ML
0.5 INJECTION, SOLUTION INTRAMUSCULAR; INTRAVENOUS; SUBCUTANEOUS EVERY 5 MIN PRN
Status: DISCONTINUED | OUTPATIENT
Start: 2024-07-31 | End: 2024-07-31 | Stop reason: HOSPADM

## 2024-07-31 RX ORDER — SODIUM CHLORIDE 0.9 % (FLUSH) 0.9 %
5-40 SYRINGE (ML) INJECTION EVERY 12 HOURS SCHEDULED
Status: DISCONTINUED | OUTPATIENT
Start: 2024-07-31 | End: 2024-07-31 | Stop reason: HOSPADM

## 2024-07-31 RX ORDER — ONDANSETRON 2 MG/ML
4 INJECTION INTRAMUSCULAR; INTRAVENOUS
Status: DISCONTINUED | OUTPATIENT
Start: 2024-07-31 | End: 2024-07-31 | Stop reason: HOSPADM

## 2024-07-31 RX ORDER — SODIUM CHLORIDE 9 MG/ML
INJECTION, SOLUTION INTRAVENOUS PRN
Status: DISCONTINUED | OUTPATIENT
Start: 2024-07-31 | End: 2024-07-31 | Stop reason: HOSPADM

## 2024-07-31 RX ORDER — ACETAMINOPHEN 500 MG
500 TABLET ORAL EVERY 6 HOURS
Status: DISCONTINUED | OUTPATIENT
Start: 2024-07-31 | End: 2024-08-03 | Stop reason: HOSPADM

## 2024-07-31 RX ORDER — DIPHENHYDRAMINE HYDROCHLORIDE 50 MG/ML
25 INJECTION INTRAMUSCULAR; INTRAVENOUS EVERY 6 HOURS PRN
Status: DISCONTINUED | OUTPATIENT
Start: 2024-07-31 | End: 2024-08-03 | Stop reason: HOSPADM

## 2024-07-31 RX ORDER — DIPHENHYDRAMINE HCL 25 MG
25 CAPSULE ORAL EVERY 6 HOURS PRN
Status: DISCONTINUED | OUTPATIENT
Start: 2024-07-31 | End: 2024-08-03 | Stop reason: HOSPADM

## 2024-07-31 RX ORDER — ATORVASTATIN CALCIUM 20 MG/1
20 TABLET, FILM COATED ORAL NIGHTLY
Status: DISCONTINUED | OUTPATIENT
Start: 2024-07-31 | End: 2024-08-03 | Stop reason: HOSPADM

## 2024-07-31 RX ORDER — SODIUM CHLORIDE 0.9 % (FLUSH) 0.9 %
5-40 SYRINGE (ML) INJECTION EVERY 12 HOURS SCHEDULED
Status: DISCONTINUED | OUTPATIENT
Start: 2024-07-31 | End: 2024-08-03 | Stop reason: HOSPADM

## 2024-07-31 RX ORDER — METOPROLOL SUCCINATE 25 MG/1
25 TABLET, EXTENDED RELEASE ORAL
Status: COMPLETED | OUTPATIENT
Start: 2024-07-31 | End: 2024-07-31

## 2024-07-31 RX ORDER — FENTANYL CITRATE 50 UG/ML
25 INJECTION, SOLUTION INTRAMUSCULAR; INTRAVENOUS EVERY 5 MIN PRN
Status: DISCONTINUED | OUTPATIENT
Start: 2024-07-31 | End: 2024-07-31 | Stop reason: HOSPADM

## 2024-07-31 RX ORDER — METOPROLOL SUCCINATE 25 MG/1
25 TABLET, EXTENDED RELEASE ORAL NIGHTLY
Status: DISCONTINUED | OUTPATIENT
Start: 2024-07-31 | End: 2024-08-03 | Stop reason: HOSPADM

## 2024-07-31 RX ORDER — SODIUM CHLORIDE 9 MG/ML
INJECTION, SOLUTION INTRAVENOUS CONTINUOUS
Status: DISCONTINUED | OUTPATIENT
Start: 2024-07-31 | End: 2024-08-02

## 2024-07-31 RX ORDER — MEPERIDINE HYDROCHLORIDE 50 MG/ML
12.5 INJECTION INTRAMUSCULAR; INTRAVENOUS; SUBCUTANEOUS AS NEEDED
Status: DISCONTINUED | OUTPATIENT
Start: 2024-07-31 | End: 2024-07-31 | Stop reason: HOSPADM

## 2024-07-31 RX ORDER — ONDANSETRON 4 MG/1
4 TABLET, ORALLY DISINTEGRATING ORAL EVERY 8 HOURS PRN
Status: DISCONTINUED | OUTPATIENT
Start: 2024-07-31 | End: 2024-08-03 | Stop reason: HOSPADM

## 2024-07-31 RX ORDER — ONDANSETRON 2 MG/ML
4 INJECTION INTRAMUSCULAR; INTRAVENOUS EVERY 6 HOURS PRN
Status: DISCONTINUED | OUTPATIENT
Start: 2024-07-31 | End: 2024-08-03 | Stop reason: HOSPADM

## 2024-07-31 RX ORDER — DIPHENHYDRAMINE HYDROCHLORIDE 50 MG/ML
12.5 INJECTION INTRAMUSCULAR; INTRAVENOUS
Status: DISCONTINUED | OUTPATIENT
Start: 2024-07-31 | End: 2024-07-31 | Stop reason: HOSPADM

## 2024-07-31 RX ORDER — SODIUM CHLORIDE, SODIUM LACTATE, POTASSIUM CHLORIDE, CALCIUM CHLORIDE 600; 310; 30; 20 MG/100ML; MG/100ML; MG/100ML; MG/100ML
INJECTION, SOLUTION INTRAVENOUS CONTINUOUS
Status: DISCONTINUED | OUTPATIENT
Start: 2024-07-31 | End: 2024-07-31 | Stop reason: HOSPADM

## 2024-07-31 RX ORDER — TRAMADOL HYDROCHLORIDE 50 MG/1
50 TABLET ORAL EVERY 6 HOURS PRN
Status: DISCONTINUED | OUTPATIENT
Start: 2024-07-31 | End: 2024-08-03 | Stop reason: HOSPADM

## 2024-07-31 RX ORDER — NALOXONE HYDROCHLORIDE 0.4 MG/ML
INJECTION, SOLUTION INTRAMUSCULAR; INTRAVENOUS; SUBCUTANEOUS PRN
Status: DISCONTINUED | OUTPATIENT
Start: 2024-07-31 | End: 2024-07-31 | Stop reason: HOSPADM

## 2024-07-31 RX ORDER — OXYCODONE HYDROCHLORIDE 5 MG/1
5 TABLET ORAL EVERY 6 HOURS PRN
Status: DISCONTINUED | OUTPATIENT
Start: 2024-07-31 | End: 2024-08-01

## 2024-07-31 RX ORDER — GABAPENTIN 300 MG/1
300 CAPSULE ORAL 3 TIMES DAILY
Status: DISCONTINUED | OUTPATIENT
Start: 2024-07-31 | End: 2024-08-03 | Stop reason: HOSPADM

## 2024-07-31 RX ORDER — CLOPIDOGREL BISULFATE 75 MG/1
75 TABLET ORAL
Status: DISCONTINUED | OUTPATIENT
Start: 2024-07-31 | End: 2024-08-03 | Stop reason: HOSPADM

## 2024-07-31 RX ORDER — PROPOFOL 10 MG/ML
INJECTION, EMULSION INTRAVENOUS PRN
Status: DISCONTINUED | OUTPATIENT
Start: 2024-07-31 | End: 2024-07-31 | Stop reason: SDUPTHER

## 2024-07-31 RX ORDER — OXYCODONE HYDROCHLORIDE 5 MG/1
5 TABLET ORAL
Status: DISCONTINUED | OUTPATIENT
Start: 2024-07-31 | End: 2024-07-31 | Stop reason: HOSPADM

## 2024-07-31 RX ORDER — GLYCOPYRROLATE 0.2 MG/ML
INJECTION INTRAMUSCULAR; INTRAVENOUS PRN
Status: DISCONTINUED | OUTPATIENT
Start: 2024-07-31 | End: 2024-07-31 | Stop reason: SDUPTHER

## 2024-07-31 RX ORDER — ONDANSETRON 2 MG/ML
INJECTION INTRAMUSCULAR; INTRAVENOUS PRN
Status: DISCONTINUED | OUTPATIENT
Start: 2024-07-31 | End: 2024-07-31 | Stop reason: SDUPTHER

## 2024-07-31 RX ORDER — DROPERIDOL 2.5 MG/ML
0.62 INJECTION, SOLUTION INTRAMUSCULAR; INTRAVENOUS
Status: DISCONTINUED | OUTPATIENT
Start: 2024-07-31 | End: 2024-07-31 | Stop reason: HOSPADM

## 2024-07-31 RX ORDER — FAMOTIDINE 20 MG/1
20 TABLET, FILM COATED ORAL 2 TIMES DAILY
Status: DISCONTINUED | OUTPATIENT
Start: 2024-07-31 | End: 2024-08-03 | Stop reason: HOSPADM

## 2024-07-31 RX ORDER — SODIUM CHLORIDE 0.9 % (FLUSH) 0.9 %
5-40 SYRINGE (ML) INJECTION PRN
Status: DISCONTINUED | OUTPATIENT
Start: 2024-07-31 | End: 2024-07-31 | Stop reason: HOSPADM

## 2024-07-31 RX ORDER — IPRATROPIUM BROMIDE AND ALBUTEROL SULFATE 2.5; .5 MG/3ML; MG/3ML
1 SOLUTION RESPIRATORY (INHALATION)
Status: DISCONTINUED | OUTPATIENT
Start: 2024-07-31 | End: 2024-07-31 | Stop reason: HOSPADM

## 2024-07-31 RX ADMIN — HYDROMORPHONE HYDROCHLORIDE 1 MG: 1 INJECTION, SOLUTION INTRAMUSCULAR; INTRAVENOUS; SUBCUTANEOUS at 13:09

## 2024-07-31 RX ADMIN — WATER 2000 MG: 1 INJECTION INTRAMUSCULAR; INTRAVENOUS; SUBCUTANEOUS at 21:38

## 2024-07-31 RX ADMIN — ATORVASTATIN CALCIUM 20 MG: 20 TABLET, FILM COATED ORAL at 20:49

## 2024-07-31 RX ADMIN — FENTANYL CITRATE 25 MCG: 50 INJECTION INTRAMUSCULAR; INTRAVENOUS at 14:52

## 2024-07-31 RX ADMIN — SODIUM CHLORIDE, POTASSIUM CHLORIDE, SODIUM LACTATE AND CALCIUM CHLORIDE: 600; 310; 30; 20 INJECTION, SOLUTION INTRAVENOUS at 12:25

## 2024-07-31 RX ADMIN — GLYCOPYRROLATE 0.2 MG: 0.2 INJECTION INTRAMUSCULAR; INTRAVENOUS at 13:03

## 2024-07-31 RX ADMIN — SODIUM CHLORIDE: 9 INJECTION, SOLUTION INTRAVENOUS at 16:28

## 2024-07-31 RX ADMIN — FAMOTIDINE 20 MG: 20 TABLET, FILM COATED ORAL at 20:48

## 2024-07-31 RX ADMIN — GABAPENTIN 300 MG: 300 CAPSULE ORAL at 16:25

## 2024-07-31 RX ADMIN — HYDROMORPHONE HYDROCHLORIDE 0.5 MG: 1 INJECTION, SOLUTION INTRAMUSCULAR; INTRAVENOUS; SUBCUTANEOUS at 14:33

## 2024-07-31 RX ADMIN — MORPHINE SULFATE 2 MG: 2 INJECTION, SOLUTION INTRAMUSCULAR; INTRAVENOUS at 19:28

## 2024-07-31 RX ADMIN — ONDANSETRON HYDROCHLORIDE 4 MG: 2 INJECTION INTRAMUSCULAR; INTRAVENOUS at 13:12

## 2024-07-31 RX ADMIN — FENTANYL CITRATE 25 MCG: 50 INJECTION INTRAMUSCULAR; INTRAVENOUS at 14:39

## 2024-07-31 RX ADMIN — METOPROLOL SUCCINATE 25 MG: 25 TABLET, EXTENDED RELEASE ORAL at 20:49

## 2024-07-31 RX ADMIN — FENTANYL CITRATE 100 MCG: 50 INJECTION, SOLUTION INTRAMUSCULAR; INTRAVENOUS at 13:26

## 2024-07-31 RX ADMIN — MIDAZOLAM 2 MG: 1 INJECTION INTRAMUSCULAR; INTRAVENOUS at 13:03

## 2024-07-31 RX ADMIN — VANCOMYCIN HYDROCHLORIDE 1000 MG: 1 INJECTION, POWDER, LYOPHILIZED, FOR SOLUTION INTRAVENOUS at 20:14

## 2024-07-31 RX ADMIN — CLOPIDOGREL BISULFATE 75 MG: 75 TABLET ORAL at 20:48

## 2024-07-31 RX ADMIN — Medication 50 MG: at 13:09

## 2024-07-31 RX ADMIN — MORPHINE SULFATE 2 MG: 2 INJECTION, SOLUTION INTRAMUSCULAR; INTRAVENOUS at 22:07

## 2024-07-31 RX ADMIN — PROPOFOL 200 MG: 10 INJECTION, EMULSION INTRAVENOUS at 13:09

## 2024-07-31 RX ADMIN — ACETAMINOPHEN 500 MG: 500 TABLET ORAL at 20:48

## 2024-07-31 RX ADMIN — GABAPENTIN 300 MG: 300 CAPSULE ORAL at 20:48

## 2024-07-31 RX ADMIN — SODIUM CHLORIDE, POTASSIUM CHLORIDE, SODIUM LACTATE AND CALCIUM CHLORIDE: 600; 310; 30; 20 INJECTION, SOLUTION INTRAVENOUS at 14:42

## 2024-07-31 RX ADMIN — ACETAMINOPHEN 500 MG: 500 TABLET ORAL at 16:25

## 2024-07-31 RX ADMIN — OXYCODONE 5 MG: 5 TABLET ORAL at 16:25

## 2024-07-31 RX ADMIN — METOPROLOL SUCCINATE 25 MG: 25 TABLET, EXTENDED RELEASE ORAL at 12:58

## 2024-07-31 RX ADMIN — METOPROLOL SUCCINATE 25 MG: 25 TABLET, EXTENDED RELEASE ORAL at 12:47

## 2024-07-31 RX ADMIN — WATER 2000 MG: 1 INJECTION INTRAMUSCULAR; INTRAVENOUS; SUBCUTANEOUS at 13:04

## 2024-07-31 RX ADMIN — HYDROMORPHONE HYDROCHLORIDE 0.5 MG: 1 INJECTION, SOLUTION INTRAMUSCULAR; INTRAVENOUS; SUBCUTANEOUS at 14:26

## 2024-07-31 ASSESSMENT — PAIN DESCRIPTION - PAIN TYPE
TYPE: SURGICAL PAIN

## 2024-07-31 ASSESSMENT — PAIN SCALES - GENERAL
PAINLEVEL_OUTOF10: 6
PAINLEVEL_OUTOF10: 9
PAINLEVEL_OUTOF10: 10
PAINLEVEL_OUTOF10: 6
PAINLEVEL_OUTOF10: 7
PAINLEVEL_OUTOF10: 6
PAINLEVEL_OUTOF10: 7
PAINLEVEL_OUTOF10: 8
PAINLEVEL_OUTOF10: 9
PAINLEVEL_OUTOF10: 6
PAINLEVEL_OUTOF10: 5

## 2024-07-31 ASSESSMENT — PAIN - FUNCTIONAL ASSESSMENT
PAIN_FUNCTIONAL_ASSESSMENT: ACTIVITIES ARE NOT PREVENTED
PAIN_FUNCTIONAL_ASSESSMENT: 0-10
PAIN_FUNCTIONAL_ASSESSMENT: ACTIVITIES ARE NOT PREVENTED

## 2024-07-31 ASSESSMENT — PAIN DESCRIPTION - ONSET
ONSET: ON-GOING

## 2024-07-31 ASSESSMENT — PAIN DESCRIPTION - ORIENTATION
ORIENTATION: RIGHT

## 2024-07-31 ASSESSMENT — PAIN DESCRIPTION - FREQUENCY
FREQUENCY: CONTINUOUS

## 2024-07-31 ASSESSMENT — PAIN DESCRIPTION - LOCATION
LOCATION: KNEE

## 2024-07-31 ASSESSMENT — PAIN DESCRIPTION - DESCRIPTORS
DESCRIPTORS: ACHING;BURNING;THROBBING
DESCRIPTORS: ACHING;BURNING;THROBBING
DESCRIPTORS: ACHING
DESCRIPTORS: THROBBING
DESCRIPTORS: ACHING;BURNING;THROBBING
DESCRIPTORS: ACHING;BURNING;THROBBING
DESCRIPTORS: SHARP;BURNING;THROBBING
DESCRIPTORS: ACHING;BURNING;THROBBING
DESCRIPTORS: ACHING

## 2024-07-31 ASSESSMENT — LIFESTYLE VARIABLES: SMOKING_STATUS: 1

## 2024-07-31 NOTE — PROGRESS NOTES
19:30 Assessment completed. Lungs are clear bilat. Ace wrap on RLE remains C/D/I with CMS &PP intact. Denies pain or discomfort in calves.Resting quietly in bed with eyes closed between cares x for voiding per urinal w/o difficulty.    22:30 Shift assessment completed. See ns flow sheet for details.    02:40 Reassessed, Ace wrap remains C/D/I with bloody drainage per AMARILYS. CMS remains intact.Ice pack was refilled &re-applied. Immobilizer remains in place.c/o severe burning in knee.(Tearful).  Voiding per urinal w/o difficulty.    02:50 Paged Dr Bueno with burning sensation.    02:55 Order received for Ativan 0.5 mg IV.    03:45 Reassessed, pain is now 5/10, burning is subsiding.     07:15 Bedside and Verbal shift change report given to NATALI Ayon RN (oncoming nurse) by JAGJIT Sweeney RN (offgoing nurse). Report included the following information Nurse Handoff Report.      [Follow-Up] : a follow-up visit [COPD] : COPD [Shortness of Breath] : shortness of breath

## 2024-07-31 NOTE — OP NOTE
[address]  [address]                            OPERATIVE REPORT      PATIENT NAME: THELMA MOSCOSO             : 1960  MED REC NO: [id number]                       ROOM: [address] [place] [address] NO: [id number]                       ADMIT DATE: 2024  PROVIDER: Siddhartha Shay MD    DATE OF SERVICE:  2024    PREOPERATIVE DIAGNOSES:  Right revision knee infection.    POSTOPERATIVE DIAGNOSES:  Right revision knee infection.    PROCEDURES PERFORMED:  Right knee debridement with poly swap, placement of a deep drain.    SURGEON:  Siddhartha Shay MD    ASSISTANT:  There were no assistants.    ANESTHESIA:  General.    ESTIMATED BLOOD LOSS:  min    SPECIMENS REMOVED:  Included aerobic, anaerobic, and fungal cultures from the right knee.    INTRAOPERATIVE FINDINGS:  Probable right revision knee infection.     COMPLICATIONS:  None.    IMPLANTS:  DePuy 3 x 10 mm poly insert.    INDICATIONS:  A 64-year-old male who about a month ago underwent a revision knee in the office.  He had an area of full-thickness skin loss.  Cultures started growing out Staph.  The patient presented a day ago with increased swelling in the knee and was suspicious for an infection.    DESCRIPTION OF PROCEDURE:  The patient was brought to the operating room.  In the OR, general anesthesia was administered.  A tourniquet was placed on the right thigh.  Right lower extremity prepped and draped sterilely.  After exsanguination, tourniquet inflated to 350 mmHg.  At this point, the previous skin incision was opened.  A medial arthrotomy was performed removing the old sutures.  At this point, there was fluid in the knee.  Cultures were taken and sent.  Antibiotics were then started, Ancef.  At this point, we did not use a scalpel, but used a curette to debride the inside of the knee, the capsule and underneath the skin throughout the length of the incisions.  With the

## 2024-07-31 NOTE — PERIOP NOTE
TRANSFER - IN REPORT:    Verbal report received from OR nurse and CRNA on Adam May  being received from OR for routine progression of patient care      Report consisted of patient's Situation, Background, Assessment and   Recommendations(SBAR).     Information from the following report(s) Adult Overview, Surgery Report, Intake/Output, and MAR was reviewed with the receiving nurse.    Opportunity for questions and clarification was provided.      Assessment completed upon patient's arrival to unit and care assumed.

## 2024-07-31 NOTE — CARE COORDINATION
This writer spoke with the pt at the bedside. The pt was almost fully independent prior to surgery, besides requiring mechanical assistance for mobility (cane). DME which the pt already has includes:  -Cane  -Walker  Anticipated DC to home with home health. This writer anticipates a need for a [employer] consult for IV antibiotic self-administration. No other DC needs identified at this time. All questions answered.   07/31/24 9807   Service Assessment   Patient Orientation Alert and Oriented   Cognition Alert   History Provided By Patient   Primary Caregiver Self   Support Systems Spouse/Significant Other;Children   Patient's Healthcare Decision Maker is: Legal Next of Kin   PCP Verified by CM Yes   Last Visit to PCP Within last 3 months   Prior Functional Level Assistance with the following:;Mobility   Current Functional Level Assistance with the following:;Mobility   Can patient return to prior living arrangement Yes   Ability to make needs known: Fair   Family able to assist with home care needs: Yes   Would you like for me to discuss the discharge plan with any other family members/significant others, and if so, who? Yes  (Spouse)   Financial Resources Medicare   Community Resources None   Social/Functional History   Lives With Spouse;Daughter   Type of Home House   Home Layout Multi-level;Performs ADL's on one level   Home Access Stairs to enter with rails   Entrance Stairs - Number of Steps 3   Entrance Stairs - Rails Right   Home Equipment Cane;Walker - Standard   Receives Help From Family   ADL Assistance Independent   Homemaking Responsibilities No   Ambulation Assistance Needs assistance   Transfer Assistance Independent   Active  Yes   Mode of Transportation Car   's degree   Occupation On disability   Discharge Planning   Type of Residence House   Living Arrangements Spouse/Significant Other;Children   Current Services Prior To Admission Durable Medical Equipment   Current DME  Prior to Arrival Cane;Walker   Potential Assistance Needed Home Care   DME Ordered? No   Potential Assistance Purchasing Medications No   Type of Home Care Services PT;OT;IV Therapy   Patient expects to be discharged to: House   One/Two Story Residence Two story   # of Interior Steps 8   Height of Each Step (in) 6 INCHES   Interior Rails Both   Lift Chair Available No   History of falls? 0   Services At/After Discharge   Transition of Care Consult (CM Consult) Home Health;Discharge Planning   Services At/After Discharge PT;OT;IV Therapy    Resource Information Provided? No   Mode of Transport at Discharge Other (see comment)  (Spouse)   Confirm Follow Up Transport Family   Condition of Participation: Discharge Planning   The Plan for Transition of Care is related to the following treatment goals: Get back to home with home health   The Patient and/or Patient Representative was provided with a Choice of Provider? Patient   The Patient and/Or Patient Representative agree with the Discharge Plan? Yes   Freedom of Choice list was provided with basic dialogue that supports the patient's individualized plan of care/goals, treatment preferences, and shares the quality data associated with the providers?  Yes

## 2024-07-31 NOTE — PERIOP NOTE
Reviewed PTA medication list with patient/caregiver and patient/caregiver denies any additional medications.     Patient admits to having a responsible adult care for them at home for at least 24 hours after surgery.    Patient encouraged to use gown warming system and informed that using said warming gown to regulate body temperature prior to a procedure has been shown to help reduce the risks of blood clots and infection.    Patient's pharmacy of choice verified and documented in PTA medication section.    Dual skin assessment & fall risk band verification completed with Ti CISNEROS RN.

## 2024-07-31 NOTE — ANESTHESIA PRE PROCEDURE
Department of Anesthesiology  Preprocedure Note       Name:  Adam May   Age:  64 y.o.  :  1960                                          MRN:  [id number]         Date:  2024      Surgeon: Surgeon(s):  Siddhartha Shay MD    Procedure: Procedure(s):  RIGHT KNEE IRRIGATION & DEBRIDEMENT WITH POLY SWAP & WOUND VAC APPLICATION ( FLAGGED AS INPATIENT DUE TO INSURANCE)    Medications prior to admission:   Prior to Admission medications    Medication Sig Start Date End Date Taking? Authorizing Provider   oxyCODONE (ROXICODONE) 5 MG immediate release tablet Take 1 tablet by mouth every 6 hours as needed for Pain.   Yes Tobias Toth MD   acetaminophen (TYLENOL) 500 MG tablet Take 1 tablet by mouth every 6 hours 24   Siddhartha Shay MD   clopidogrel (PLAVIX) 75 MG tablet Take 1 tablet by mouth nightly Indications: Cardiac stent    ProviderTobias MD   famotidine (PEPCID) 20 MG tablet Take 1 tablet by mouth 2 times daily Indications: GERD    ProviderTobias MD   meloxicam (MOBIC) 15 MG tablet Take 1 tablet by mouth nightly Indications: osteoarthritis    ProviderTobias MD   gabapentin (NEURONTIN) 300 MG capsule Take 1 capsule by mouth 3 times daily. Indications: neuropathy    ProviderTobias MD   metoprolol succinate (TOPROL XL) 25 MG extended release tablet Take 1 tablet by mouth at bedtime Indications: HTN    ProviderTobias MD   atorvastatin (LIPITOR) 20 MG tablet Take 1 tablet by mouth at bedtime Indications: high cholesterol    ProviderTobias MD       Current medications:    Current Facility-Administered Medications   Medication Dose Route Frequency Provider Last Rate Last Admin    lactated ringers IV soln infusion   IntraVENous Continuous Siddhartha Shay  mL/hr at 24 1225 New Bag at 24 1225    ceFAZolin (ANCEF) 2,000 mg in sterile water 20 mL IV syringe  2,000 mg IntraVENous On Call to OR Siddhartha Shay MD        metoprolol

## 2024-07-31 NOTE — CONSULTS
[facility]  (A Division of [facility])      Consultation Note      Date of Admission: 7/31/2024      Date of Note: 7/31/2024      Reason for Referral: R TKA/PJI        Current Antimicrobials:    Prior Antimicrobials:  Cefazolin IV (7/31-) #1 dose  Vancomycin IV (7/31-) #ordered        Assessment: Rec / Plan:   RTKA/PJI - POD #0  8/1:  CRP requested  Out of surgery; quick surgeon report (+) purulence deep.  Plan on going long - 6wks IV.  Will scut MICRO; plan on PICC probably on FRI morning.  More to follow. ->POD#0    Will scut MICRO  If you have office cx, let me know  Agree with vanco + cefazolin for now.  More to follow on Friday.   DMT2 - great control A1c 6.2%    HTN    CAD    BPH          Microbiology:  7/1 - OR sent       6/10 - MRSA screen (-)      Lines / Catheters: peripheral      HPI:  CC:  \"My knee swelled\"  Mr May is a 64y AAM who underwent a revision of TKA on 6.24 (aseptic loosening of Tibial component) - initial TKA in JUN18.  Developed progressive swelling/pain to knee with difficulty ambulating.  Has had some f/c last few days; no sweats.  Seen in office earlier this week and scheduled for debridement today.  No prior (+) TSTs    Unemployed currently     Principal Problem:    Postoperative wound infection  Resolved Problems:    * No resolved hospital problems. *    Past Medical History:   Diagnosis Date    CAD (coronary artery disease)     Dr. Conn    Deaf, left     no hearing aids    Hyperlipidemia     Hypertension     before 2014    Osteoarthritis     multiple joints    Wears dentures     bilateral    Wears glasses      Past Surgical History:   Procedure Laterality Date    COLONOSCOPY  2018    CORONARY ANGIOPLASTY WITH STENT PLACEMENT  2017    Dr. Conn    ENDOSCOPY, COLON, DIAGNOSTIC  2018    KNEE ARTHROPLASTY Right 06/25/2018    KNEE SURGERY Right 2018    wound infection    REVISION TOTAL KNEE ARTHROPLASTY Right 6/24/2024    RIGHT  PM

## 2024-07-31 NOTE — H&P
History and Physical        Patient: Adam May               Sex: male          DOA: 7/31/2024         YOB: 1960      Age:  64 y.o.        LOS:  LOS: 0 days        HPI:     Adam May is a 64 y.o. male who is s/p TKRev has developed full thickness skin loss positive for staph and persistent effusion suspicious for infection    Past Medical History:   Diagnosis Date    CAD (coronary artery disease)     Dr. Conn    Deaf, left     no hearing aids    Hyperlipidemia     Hypertension     before 2014    Osteoarthritis     multiple joints    Wears dentures     bilateral    Wears glasses        Past Surgical History:   Procedure Laterality Date    COLONOSCOPY  2018    CORONARY ANGIOPLASTY WITH STENT PLACEMENT  2017    Dr. Conn    ENDOSCOPY, COLON, DIAGNOSTIC  2018    KNEE ARTHROPLASTY Right 06/25/2018    KNEE SURGERY Right 2018    wound infection    REVISION TOTAL KNEE ARTHROPLASTY Right 6/24/2024    RIGHT TOTAL KNEE REVISION **INPATIENT STATUS DUE TO MEDICARE** performed by Siddhartha Shay MD at [facility] MAIN OR       History reviewed. No pertinent family history.    Social History     Socioeconomic History    Marital status:      Spouse name: None    Number of children: None    Years of education: None    Highest education level: None   Tobacco Use    Smoking status: Every Day     Current packs/day: 0.50     Types: Cigarettes    Smokeless tobacco: Never    Tobacco comments:     No smoking 24 hours prior to surgery   Vaping Use    Vaping Use: Never used   Substance and Sexual Activity    Alcohol use: Not Currently    Drug use: Yes     Frequency: 2.0 times per week     Types: Marijuana (Weed)     Comment: last used 7-    Sexual activity: Defer     Social Determinants of Health     Food Insecurity: No Food Insecurity (6/24/2024)    Hunger Vital Sign     Worried About Running Out of Food in the Last Year: Never true     Ran Out of Food in the Last Year: Never true    Transportation Needs: No Transportation Needs (6/24/2024)    PRAPARE - Transportation     Lack of Transportation (Medical): No     Lack of Transportation (Non-Medical): No   Housing Stability: Low Risk  (6/24/2024)    Housing Stability Vital Sign     Unable to Pay for Housing in the Last Year: No     Number of Places Lived in the Last Year: 1     Unstable Housing in the Last Year: No       Prior to Admission medications    Medication Sig Start Date End Date Taking? Authorizing Provider   oxyCODONE (ROXICODONE) 5 MG immediate release tablet Take 1 tablet by mouth every 6 hours as needed for Pain.   Yes Tobias Toth MD   acetaminophen (TYLENOL) 500 MG tablet Take 1 tablet by mouth every 6 hours 6/25/24   Sdidhartha Shay MD   clopidogrel (PLAVIX) 75 MG tablet Take 1 tablet by mouth nightly Indications: Cardiac stent    Tobias Toth MD   famotidine (PEPCID) 20 MG tablet Take 1 tablet by mouth 2 times daily Indications: GERD    Tobias Toth MD   meloxicam (MOBIC) 15 MG tablet Take 1 tablet by mouth nightly Indications: osteoarthritis    Tobias Toth MD   gabapentin (NEURONTIN) 300 MG capsule Take 1 capsule by mouth 3 times daily. Indications: neuropathy    Tobias Toth MD   metoprolol succinate (TOPROL XL) 25 MG extended release tablet Take 1 tablet by mouth at bedtime Indications: HTN    Tobias Toth MD   atorvastatin (LIPITOR) 20 MG tablet Take 1 tablet by mouth at bedtime Indications: high cholesterol    Tobias Toth MD       Allergies   Allergen Reactions    Aspirin Other (See Comments)     Stomach upset       Review of Systems  A comprehensive review of systems was negative except for that written in the History of Present Illness.      Physical Exam:      Visit Vitals  BP (!) 155/99   Pulse 87   Temp 97.8 °F (36.6 °C) (Temporal)   Resp 18   Ht 1.803 m (5' 11\")   Wt 85.7 kg (189 lb)   SpO2 100%   BMI 26.36 kg/m²       Physical Exam:  Physical Exam:

## 2024-07-31 NOTE — CARE COORDINATION
CM NW will follow up with patient in the morning for HH preference. [employer] referral initiated for IV anitbiotics. CM will follow tomorrow.

## 2024-07-31 NOTE — ANESTHESIA POSTPROCEDURE EVALUATION
Post-Anesthesia Evaluation and Assessment    Cardiovascular Function/Vital Signs  Visit Vitals  BP (!) 149/98   Pulse 69   Temp 97.7 °F (36.5 °C) (Temporal)   Resp 16   Ht 1.803 m (5' 11\")   Wt 85.7 kg (189 lb)   SpO2 99%   BMI 26.36 kg/m²       Patient is status post Procedure(s):  RIGHT KNEE IRRIGATION & DEBRIDEMENT WITH POLY SWAP  ( FLAGGED AS INPATIENT DUE TO INSURANCE).    Nausea/Vomiting: Controlled.    Postoperative hydration reviewed and adequate.    Pain:      Managed.    Neurological Status:       At baseline.    Mental Status and Level of Consciousness: Arousable.    Pulmonary Status:       Adequate oxygenation and airway patent.    Complications related to anesthesia: None    Patient has met all discharge requirements.    Signed By: Maycol Espinoza MD    July 31, 2024

## 2024-07-31 NOTE — PROGRESS NOTES
[facility]   Pharmacy Pharmacokinetic Monitoring Service - Vancomycin     Adam May is a 64 y.o. male starting on vancomycin therapy for Surgical Site Infection. Pharmacy consulted by Dr. Siddhartha Shay for monitoring and adjustment.    Target Concentration: Goal AUC/TONYA 400-600 mg*hr/L    Additional Antimicrobials: n/a    Pertinent Laboratory Values:   Wt Readings from Last 1 Encounters:   07/30/24 85.7 kg (189 lb)     Temp Readings from Last 1 Encounters:   07/31/24 97.8 °F (36.6 °C) (Temporal)     CrCl cannot be calculated (Patient's most recent lab result is older than the maximum 30 days allowed.).  No results for input(s): \"CREATININE\", \"BUN\", \"WBC\" in the last 72 hours.      MRSA Nasal Swab: N/A. Non-respiratory infection.    Plan:  Dosing recommendations based on Bayesian software  Start vancomycin 2,000 mg IV loading dose x 1, followed by Vancomycin 1,000 mg IV every 12 hours   Anticipated AUC of 534 mg/L.hr and trough concentration of 16 mg/L at steady state  Renal labs as indicated   Vancomycin concentration not ordered at this time. Pharmacy will order when appropriate  Pharmacy will continue to monitor patient and adjust therapy as indicated    Thank you for the consult,  FAN CEDEÑO RPH  7/31/2024 1:43 PM

## 2024-08-01 ENCOUNTER — APPOINTMENT (OUTPATIENT)
Facility: HOSPITAL | Age: 64
End: 2024-08-01
Attending: ORTHOPAEDIC SURGERY
Payer: MEDICARE

## 2024-08-01 LAB
ALBUMIN SERPL-MCNC: 2 G/DL (ref 3.4–5)
ALBUMIN/GLOB SERPL: 0.5 (ref 0.8–1.7)
ALP SERPL-CCNC: 76 U/L (ref 45–117)
ALT SERPL-CCNC: 14 U/L (ref 16–61)
ANION GAP SERPL CALC-SCNC: 4 MMOL/L (ref 3–18)
AST SERPL-CCNC: 7 U/L (ref 10–38)
BACTERIA SPEC CULT: NORMAL
BACTERIA SPEC CULT: NORMAL
BASOPHILS # BLD: 0.1 K/UL (ref 0–0.1)
BASOPHILS NFR BLD: 1 % (ref 0–2)
BILIRUB SERPL-MCNC: 0.4 MG/DL (ref 0.2–1)
BUN SERPL-MCNC: 6 MG/DL (ref 7–18)
BUN/CREAT SERPL: 7 (ref 12–20)
CALCIUM SERPL-MCNC: 7.3 MG/DL (ref 8.5–10.1)
CHLORIDE SERPL-SCNC: 111 MMOL/L (ref 100–111)
CO2 SERPL-SCNC: 23 MMOL/L (ref 21–32)
CREAT SERPL-MCNC: 0.9 MG/DL (ref 0.6–1.3)
CRP SERPL-MCNC: 5.6 MG/DL (ref 0–0.3)
DIFFERENTIAL METHOD BLD: ABNORMAL
EOSINOPHIL # BLD: 0.1 K/UL (ref 0–0.4)
EOSINOPHIL NFR BLD: 1 % (ref 0–5)
ERYTHROCYTE [DISTWIDTH] IN BLOOD BY AUTOMATED COUNT: 16.3 % (ref 11.6–14.5)
GLOBULIN SER CALC-MCNC: 3.9 G/DL (ref 2–4)
GLUCOSE SERPL-MCNC: 114 MG/DL (ref 74–99)
GRAM STN SPEC: NORMAL
GRAM STN SPEC: NORMAL
HCT VFR BLD AUTO: 25.3 % (ref 36–48)
HGB BLD-MCNC: 8.1 G/DL (ref 13–16)
IMM GRANULOCYTES # BLD AUTO: 0 K/UL (ref 0–0.04)
IMM GRANULOCYTES NFR BLD AUTO: 0 % (ref 0–0.5)
LYMPHOCYTES # BLD: 2.3 K/UL (ref 0.9–3.6)
LYMPHOCYTES NFR BLD: 23 % (ref 21–52)
MCH RBC QN AUTO: 24 PG (ref 24–34)
MCHC RBC AUTO-ENTMCNC: 32 G/DL (ref 31–37)
MCV RBC AUTO: 75.1 FL (ref 78–100)
MONOCYTES # BLD: 1 K/UL (ref 0.05–1.2)
MONOCYTES NFR BLD: 10 % (ref 3–10)
NEUTS SEG # BLD: 6.6 K/UL (ref 1.8–8)
NEUTS SEG NFR BLD: 65 % (ref 40–73)
NRBC # BLD: 0 K/UL (ref 0–0.01)
NRBC BLD-RTO: 0 PER 100 WBC
PLATELET # BLD AUTO: 373 K/UL (ref 135–420)
PMV BLD AUTO: 8.7 FL (ref 9.2–11.8)
POTASSIUM SERPL-SCNC: 3.5 MMOL/L (ref 3.5–5.5)
PROT SERPL-MCNC: 5.9 G/DL (ref 6.4–8.2)
RBC # BLD AUTO: 3.37 M/UL (ref 4.35–5.65)
SERVICE CMNT-IMP: NORMAL
SERVICE CMNT-IMP: NORMAL
SODIUM SERPL-SCNC: 138 MMOL/L (ref 136–145)
WBC # BLD AUTO: 10.1 K/UL (ref 4.6–13.2)

## 2024-08-01 PROCEDURE — 85025 COMPLETE CBC W/AUTO DIFF WBC: CPT

## 2024-08-01 PROCEDURE — 97161 PT EVAL LOW COMPLEX 20 MIN: CPT

## 2024-08-01 PROCEDURE — 2580000003 HC RX 258: Performed by: ORTHOPAEDIC SURGERY

## 2024-08-01 PROCEDURE — 6360000002 HC RX W HCPCS: Performed by: INTERNAL MEDICINE

## 2024-08-01 PROCEDURE — 86140 C-REACTIVE PROTEIN: CPT

## 2024-08-01 PROCEDURE — 80053 COMPREHEN METABOLIC PANEL: CPT

## 2024-08-01 PROCEDURE — 36415 COLL VENOUS BLD VENIPUNCTURE: CPT

## 2024-08-01 PROCEDURE — 1100000000 HC RM PRIVATE

## 2024-08-01 PROCEDURE — 73560 X-RAY EXAM OF KNEE 1 OR 2: CPT

## 2024-08-01 PROCEDURE — 6370000000 HC RX 637 (ALT 250 FOR IP): Performed by: ORTHOPAEDIC SURGERY

## 2024-08-01 PROCEDURE — 97165 OT EVAL LOW COMPLEX 30 MIN: CPT

## 2024-08-01 PROCEDURE — 6360000002 HC RX W HCPCS: Performed by: ORTHOPAEDIC SURGERY

## 2024-08-01 PROCEDURE — 2580000003 HC RX 258: Performed by: INTERNAL MEDICINE

## 2024-08-01 RX ORDER — HYDROMORPHONE HYDROCHLORIDE 4 MG/1
4 TABLET ORAL EVERY 6 HOURS PRN
Status: DISCONTINUED | OUTPATIENT
Start: 2024-08-01 | End: 2024-08-03 | Stop reason: HOSPADM

## 2024-08-01 RX ORDER — LORAZEPAM 2 MG/ML
0.5 INJECTION INTRAMUSCULAR ONCE
Status: COMPLETED | OUTPATIENT
Start: 2024-08-01 | End: 2024-08-01

## 2024-08-01 RX ADMIN — ACETAMINOPHEN 500 MG: 500 TABLET ORAL at 15:47

## 2024-08-01 RX ADMIN — VANCOMYCIN HYDROCHLORIDE 1000 MG: 1 INJECTION, POWDER, LYOPHILIZED, FOR SOLUTION INTRAVENOUS at 17:43

## 2024-08-01 RX ADMIN — HYDROMORPHONE HYDROCHLORIDE 4 MG: 4 TABLET ORAL at 19:47

## 2024-08-01 RX ADMIN — WATER 2000 MG: 1 INJECTION INTRAMUSCULAR; INTRAVENOUS; SUBCUTANEOUS at 21:00

## 2024-08-01 RX ADMIN — FAMOTIDINE 20 MG: 20 TABLET, FILM COATED ORAL at 08:35

## 2024-08-01 RX ADMIN — LORAZEPAM 0.5 MG: 2 INJECTION INTRAMUSCULAR; INTRAVENOUS at 03:13

## 2024-08-01 RX ADMIN — MORPHINE SULFATE 2 MG: 2 INJECTION, SOLUTION INTRAMUSCULAR; INTRAVENOUS at 04:54

## 2024-08-01 RX ADMIN — GABAPENTIN 300 MG: 300 CAPSULE ORAL at 21:00

## 2024-08-01 RX ADMIN — CLOPIDOGREL BISULFATE 75 MG: 75 TABLET ORAL at 19:48

## 2024-08-01 RX ADMIN — MORPHINE SULFATE 2 MG: 2 INJECTION, SOLUTION INTRAMUSCULAR; INTRAVENOUS at 08:34

## 2024-08-01 RX ADMIN — WATER 2000 MG: 1 INJECTION INTRAMUSCULAR; INTRAVENOUS; SUBCUTANEOUS at 04:54

## 2024-08-01 RX ADMIN — ACETAMINOPHEN 500 MG: 500 TABLET ORAL at 21:42

## 2024-08-01 RX ADMIN — FAMOTIDINE 20 MG: 20 TABLET, FILM COATED ORAL at 19:48

## 2024-08-01 RX ADMIN — ACETAMINOPHEN 500 MG: 500 TABLET ORAL at 03:14

## 2024-08-01 RX ADMIN — WATER 2000 MG: 1 INJECTION INTRAMUSCULAR; INTRAVENOUS; SUBCUTANEOUS at 13:55

## 2024-08-01 RX ADMIN — MORPHINE SULFATE 2 MG: 2 INJECTION, SOLUTION INTRAMUSCULAR; INTRAVENOUS at 02:02

## 2024-08-01 RX ADMIN — MORPHINE SULFATE 2 MG: 2 INJECTION, SOLUTION INTRAMUSCULAR; INTRAVENOUS at 16:23

## 2024-08-01 RX ADMIN — VANCOMYCIN HYDROCHLORIDE 1000 MG: 1 INJECTION, POWDER, LYOPHILIZED, FOR SOLUTION INTRAVENOUS at 06:18

## 2024-08-01 RX ADMIN — GABAPENTIN 300 MG: 300 CAPSULE ORAL at 08:35

## 2024-08-01 RX ADMIN — OXYCODONE 5 MG: 5 TABLET ORAL at 10:27

## 2024-08-01 RX ADMIN — ATORVASTATIN CALCIUM 20 MG: 20 TABLET, FILM COATED ORAL at 19:49

## 2024-08-01 RX ADMIN — SODIUM CHLORIDE: 9 INJECTION, SOLUTION INTRAVENOUS at 02:05

## 2024-08-01 RX ADMIN — GABAPENTIN 300 MG: 300 CAPSULE ORAL at 13:55

## 2024-08-01 RX ADMIN — METOPROLOL SUCCINATE 25 MG: 25 TABLET, EXTENDED RELEASE ORAL at 19:49

## 2024-08-01 RX ADMIN — MORPHINE SULFATE 2 MG: 2 INJECTION, SOLUTION INTRAMUSCULAR; INTRAVENOUS at 12:30

## 2024-08-01 RX ADMIN — ACETAMINOPHEN 500 MG: 500 TABLET ORAL at 08:34

## 2024-08-01 RX ADMIN — OXYCODONE 5 MG: 5 TABLET ORAL at 02:40

## 2024-08-01 ASSESSMENT — PAIN DESCRIPTION - PAIN TYPE
TYPE: SURGICAL PAIN

## 2024-08-01 ASSESSMENT — PAIN DESCRIPTION - ORIENTATION
ORIENTATION: RIGHT

## 2024-08-01 ASSESSMENT — PAIN SCALES - GENERAL
PAINLEVEL_OUTOF10: 8
PAINLEVEL_OUTOF10: 7
PAINLEVEL_OUTOF10: 10
PAINLEVEL_OUTOF10: 7
PAINLEVEL_OUTOF10: 8
PAINLEVEL_OUTOF10: 7
PAINLEVEL_OUTOF10: 10
PAINLEVEL_OUTOF10: 10
PAINLEVEL_OUTOF10: 8
PAINLEVEL_OUTOF10: 10
PAINLEVEL_OUTOF10: 8
PAINLEVEL_OUTOF10: 9
PAINLEVEL_OUTOF10: 5
PAINLEVEL_OUTOF10: 10

## 2024-08-01 ASSESSMENT — PAIN - FUNCTIONAL ASSESSMENT
PAIN_FUNCTIONAL_ASSESSMENT: ACTIVITIES ARE NOT PREVENTED
PAIN_FUNCTIONAL_ASSESSMENT: PREVENTS OR INTERFERES SOME ACTIVE ACTIVITIES AND ADLS
PAIN_FUNCTIONAL_ASSESSMENT: PREVENTS OR INTERFERES SOME ACTIVE ACTIVITIES AND ADLS
PAIN_FUNCTIONAL_ASSESSMENT: ACTIVITIES ARE NOT PREVENTED
PAIN_FUNCTIONAL_ASSESSMENT: PREVENTS OR INTERFERES SOME ACTIVE ACTIVITIES AND ADLS
PAIN_FUNCTIONAL_ASSESSMENT: ACTIVITIES ARE NOT PREVENTED

## 2024-08-01 ASSESSMENT — PAIN DESCRIPTION - FREQUENCY
FREQUENCY: CONTINUOUS
FREQUENCY: INTERMITTENT
FREQUENCY: CONTINUOUS

## 2024-08-01 ASSESSMENT — PAIN DESCRIPTION - ONSET
ONSET: ON-GOING

## 2024-08-01 ASSESSMENT — PAIN DESCRIPTION - DESCRIPTORS
DESCRIPTORS: SHARP;BURNING
DESCRIPTORS: BURNING
DESCRIPTORS: BURNING
DESCRIPTORS: SHARP
DESCRIPTORS: TIGHTNESS;BURNING
DESCRIPTORS: BURNING
DESCRIPTORS: SHARP;BURNING
DESCRIPTORS: ACHING
DESCRIPTORS: SHARP;BURNING

## 2024-08-01 ASSESSMENT — PAIN DESCRIPTION - LOCATION
LOCATION: KNEE

## 2024-08-01 NOTE — PROGRESS NOTES
[facility]   Pharmacy Pharmacokinetic Monitoring Service - Vancomycin    Consulting Provider: Dr. Siddhartha Shay   Indication: Surgical Site Infection  Target Concentration: Goal AUC/TONYA 400-600 mg*hr/L  Day of Therapy: 2  Additional Antimicrobials: Cefazolin    Pertinent Laboratory Values:   Wt Readings from Last 1 Encounters:   07/30/24 85.7 kg (189 lb)     Temp Readings from Last 1 Encounters:   08/01/24 99.1 °F (37.3 °C) (Oral)     Estimated Creatinine Clearance: 89 mL/min (based on SCr of 0.9 mg/dL).  Recent Labs     07/31/24  1633 08/01/24  0529   CREATININE 1.13 0.90   BUN 10 6*   WBC  --  10.1         Pertinent Cultures:  Culture Date Source Results   7/31 7/31 Wound  Fungus IP  IP   MRSA Nasal Swab: N/A. Non-respiratory infection.    Recent vancomycin administrations                     vancomycin (VANCOCIN) 1,000 mg in sodium chloride 0.9 % 250 mL (vial-mate) IVPB (mg) 1,000 mg New Bag 08/01/24 0618     1,000 mg New Bag 07/31/24 2014                      Plan:  Current dosing regimen is therapeutic  Continue current dose: Vancomycin 1,000 mg IV every 12 hours         AUC/Tr= 425 mg/L.hr/13.5 mg/L  Repeat vancomycin concentration ordered for 8/2 @ 0500   Pharmacy will continue to monitor patient and adjust therapy as indicated    Thank you for the consult,  FAN CEDEÑO RPH  8/1/2024 8:26 AM

## 2024-08-01 NOTE — PROGRESS NOTES
Ortho POD1    S/P debridement; poly exchange; drain on IVAB    Alert  VSS  Temp 100 during night; otherwise has been normal  Right LE in immobilizer adjusted to keep knee straight. Want to let tissues, skin heal anteriorly. Was able to close defect so no wound vac should be needed    Stable    Plan: IVAB, check cultures, probable PICC   I will fax office labs to floor  DC drain tomorrow

## 2024-08-01 NOTE — PROGRESS NOTES
1623: Shift assessment completed & documented in flowsheet, Pt stable at moment, no s/sx of distress, chest rise and fall equally, no SOB noted at this moment, HS S1S2 stable & reg, BS x4 normoactive, V/S stable at this moment, Ace wrap dressing to RLE is C/D/I, Pt currently resting in bed at safe position, call bell within reach, ongoing monitoring in place.

## 2024-08-01 NOTE — PLAN OF CARE
Problem: Chronic Conditions and Co-morbidities  Goal: Patient's chronic conditions and co-morbidity symptoms are monitored and maintained or improved  7/31/2024 2332 by Justine Sweeney, RN  Outcome: Progressing  7/31/2024 2042 by Divya Rice RN  Outcome: Progressing     Problem: Pain  Goal: Verbalizes/displays adequate comfort level or baseline comfort level  7/31/2024 2332 by Justine Sweeney, RN  Outcome: Progressing  Flowsheets (Taken 7/31/2024 2227)  Verbalizes/displays adequate comfort level or baseline comfort level:   Encourage patient to monitor pain and request assistance   Assess pain using appropriate pain scale   Administer analgesics based on type and severity of pain and evaluate response   Implement non-pharmacological measures as appropriate and evaluate response   Notify Licensed Independent Practitioner if interventions unsuccessful or patient reports new pain  7/31/2024 2042 by Divya Rice RN  Outcome: Progressing     Problem: Safety - Adult  Goal: Free from fall injury  7/31/2024 2332 by Justine Sweeney, RN  Outcome: Progressing  Flowsheets (Taken 7/31/2024 2227)  Free From Fall Injury: Instruct family/caregiver on patient safety  7/31/2024 2042 by Divya Rice RN  Outcome: Progressing     Problem: Discharge Planning  Goal: Discharge to home or other facility with appropriate resources  7/31/2024 2332 by Justine Sweeney RN  Outcome: Progressing     Problem: ABCDS Injury Assessment  Goal: Absence of physical injury  7/31/2024 2332 by Justine Sweeney, RN  Outcome: Progressing  Flowsheets (Taken 7/31/2024 2227)  Absence of Physical Injury: Implement safety measures based on patient assessment

## 2024-08-01 NOTE — PROGRESS NOTES
0715-  Bedside and Verbal shift change report given to DAVID Oneal (oncoming nurse) by DAVID Fletcher (offgoing nurse). Report included the following information Nurse Handoff Report, Adult Overview, Surgery Report, Intake/Output, MAR, and Recent Results. Assumed care of pt at this time.     0840-  Assessment completed per flowsheet.     1625-  Per Dr Shay, this RN to d/c oxycodone order and to order Dilaudid 4mg oral tabs q6h PRN per [employer].     1925-  Bedside and Verbal shift change report given to DAVID Adrian (oncoming nurse) by DAVID Oneal (offgoing nurse). Report included the following information Nurse Handoff Report, Adult Overview, Surgery Report, Intake/Output, MAR, and Recent Results.

## 2024-08-01 NOTE — PROGRESS NOTES
Occupational Therapy Goals:  Initiated 8/1/2024 to be met within 7-10 days.  Short Term Goals  Time Frame for Short Term Goals: 7 days  Short Term Goal 1: Patient will complete toileting with mod I  Short Term Goal 2: Patient will complete bathing with mod I  Short Term Goal 3: Patient will complete dressing with mod I    OCCUPATIONAL THERAPY EVALUATION    Patient: Adam May (64 y.o. male)  Date: 8/1/2024  Primary Diagnosis: Postoperative retroperitoneal abscess [K68.11]  Postoperative wound infection [T81.49XA]  Procedure(s) (LRB):  RIGHT KNEE IRRIGATION & DEBRIDEMENT WITH POLY SWAP  ( FLAGGED AS INPATIENT DUE TO INSURANCE) (Right) 1 Day Post-Op   Precautions: Weight Bearing, Right Lower Extremity Weight Bearing: Weight Bearing As Tolerated, KI at all times  ,  ,  ,  ,  ,    PLOF: Patient completed ADLs at the modified independent level     ASSESSMENT :  RN cleared patient for participation in OT evaluation. Patient presented supine in bed with gripper socks and KI . Patient with no visitors present for entire session. Patient completed assessment of ADLs and BUE strength, ROM (see details below). Patient completed supine<>sit<>stand. Patient completed functional mobility with RW. Patient denied need to use bathroom or to complete grooming tasks at this time. Patient demonstrated ability to reach bilateral feet in long sitting for LBD. Due to deficits (listed below) patient may benefit from home health occupational therapy  ,      DEFICITS/IMPAIRMENTS:  Performance deficits / Impairments: Decreased functional mobility ;Decreased high-level IADLs;Decreased ADL status;Decreased endurance;Decreased ROM;Decreased strength;Decreased balance;Decreased coordination    Patient will benefit from skilled intervention to address the above impairments.  Patient's rehabilitation potential is considered to be Prognosis: Good.  Factors which may influence rehabilitation potential include:   []             None noted  []     visualized    Vision/Perceptual:    Vision: Impaired                           Vision Exceptions: Wears glasses for reading   Coordination: BUE  Coordination: Within functional limits            Balance:     Balance  Sitting: Intact  Standing: With support    Strength: BUE     Strength: Within functional limits    Tone & Sensation: BUE        Tone: Normal  Sensation: Intact    Range of Motion: BUE        AROM: Within functional limits  PROM: Within functional limits      Functional Mobility and Transfers for ADLs:  Bed Mobility:     Bed Mobility Training  Bed Mobility Training: Yes  Supine to Sit: Minimum assistance  Sit to Supine: Supervision  Transfers:                 Transfer Training  Transfer Training: Yes  Sit to Stand: Contact-guard assistance  Stand to Sit: Stand-by assistance    ADL Assessment:      Feeding: Independent  Grooming: Supervision  UE Bathing: Supervision  LE Bathing: Stand by assistance  UE Dressing: Supervision  LE Dressing: Stand by assistance  Toileting: Supervision    Pain:  Pain level pre-treatment: 9/10   Pain level post-treatment: 10/10   Pain Intervention(s): Rest, Ice, Repositioning   Response to intervention: Nurse notified    Activity Tolerance:   Activity Tolerance: Patient Tolerated treatment well  Please refer to the flowsheet for vital signs taken during this treatment.    After treatment:   Safety Devices  Type of Devices: Left in bed, Call light within reach, Nurse notified   COMMUNICATION/EDUCATION:   Patient Education  Education Given To: Patient  Education Provided: Role of Therapy;Transfer Training;Plan of Care;ADL Adaptive Strategies;Fall Prevention Strategies;Mobility Training;Precautions  Education Method: Verbal  Barriers to Learning: None  Education Outcome: Verbalized understanding;Demonstrated understanding    Thank you for this referral.  KELL Reed, OTR/L  Minutes: 16    Eval Complexity: Decision Making: Low Complexity

## 2024-08-01 NOTE — PROGRESS NOTES
Physical Therapy Goals:  Initiated 8/1/2024 and to be met within 3 days.  Patient will supine to/from sit with SBA.  Patient will perform stand transfers with SB/CGA.  Patient will ambulate 50 ft with RW and SB-CGA.  Patient will negotiate 4 stairs with handrails and CGA.      [x]  Patient has met MD mobilization critieria for d/c home   [x]  Recommend HH with 24 hour adult care   []  Benefit from additional acute PT session to address:      PHYSICAL THERAPY EVALUATION    Patient: Adam May (64 y.o. male)  Date: 8/1/2024  Primary Diagnosis: Postoperative retroperitoneal abscess [K68.11]  Postoperative wound infection [T81.49XA]  Procedure(s) (LRB):  RIGHT KNEE IRRIGATION & DEBRIDEMENT WITH POLY SWAP  ( FLAGGED AS INPATIENT DUE TO INSURANCE) (Right) 1 Day Post-Op   Precautions: Fall Risk, Weight Bearing, Right Lower Extremity Weight Bearing: Weight Bearing As Tolerated (c/KI)  PLOF: Independent ambulation without AD    ASSESSMENT :  Based on the objective data described below, the patient presents with lower extremity weakness, decreased gait quality and endurance, impaired bed mobility and transfers, and overall limitations in functional mobility s/p surgery noted above with MD orders for knee immobilizer at all times. Pt performed sit to stand with SBA. Patient ambulated  feet with RW, GB applied, SBA. Pt negotiated 5 stairs with handrails and SBA. Pt educated on icing, elevation, positioning, home safety, home exercise program, and activity recommendations. Home health physical therapy is recommended upon discharge from hospital. Recommend 24 hour supervision/caregiver assistance upon d/c.    DEFICITS/IMPAIRMENTS:    , Body Structures, Functions, Activity Limitations Requiring Skilled Therapeutic Intervention: Decreased functional mobility ;Decreased ROM;Decreased strength;Decreased sensation;Increased pain    Patient will benefit from skilled intervention to address the above impairments.  Patient's  rehabilitation potential is considered to be Therapy Prognosis: Good.  Factors which may influence rehabilitation potential include:   [x]         None noted  []         Mental ability/status  []         Medical condition  []         Home/family situation and support systems  []         Safety awareness  []         Pain tolerance/management  []         Other:      PLAN :  Recommendations and Planned Interventions:  Transition to home with home health PT services and family support    Frequency/Duration: Patient will be followed by physical therapy to address goals, 1-2 times per day/4-7 days per week to address goals while patient remains in hospital.    Further Equipment Recommendations for Discharge: N/A  Discharge Recommendation: Home health PT    [facility]: 18/24    This AMPA score should be considered in conjunction with interdisciplinary team recommendations to determine the most appropriate discharge setting. Patient's social support, diagnosis, medical stability, and prior level of function should also be taken into consideration.     SUBJECTIVE:   Patient stated “It hurts a lot.”    OBJECTIVE DATA SUMMARY:     Past Medical History:   Diagnosis Date    CAD (coronary artery disease)     Dr. Conn    Deaf, left     no hearing aids    Hyperlipidemia     Hypertension     before 2014    Osteoarthritis     multiple joints    Wears dentures     bilateral    Wears glasses      Past Surgical History:   Procedure Laterality Date    COLONOSCOPY  2018    CORONARY ANGIOPLASTY WITH STENT PLACEMENT  2017    Dr. Conn    ENDOSCOPY, COLON, DIAGNOSTIC  2018    KNEE ARTHROPLASTY Right 06/25/2018    KNEE SURGERY Right 2018    wound infection    REVISION TOTAL KNEE ARTHROPLASTY Right 6/24/2024    RIGHT TOTAL KNEE REVISION **INPATIENT STATUS DUE TO MEDICARE** performed by Siddhartha Shay MD at [facility] MAIN OR    REVISION TOTAL KNEE ARTHROPLASTY Right 7/31/2024    RIGHT KNEE IRRIGATION & DEBRIDEMENT WITH POLY SWAP  ( FLAGGED AS  INPATIENT DUE TO INSURANCE) performed by Siddhartha Shay MD at [facility] MAIN OR     Barriers to Learning/Limitations: none  Compensate with: visual, verbal, tactile, kinesthetic cues/model  Home Situation:  Social/Functional History  Lives With: Spouse, Daughter  Type of Home: House  Home Layout: Multi-level, Performs ADL's on one level  Home Access: Stairs to enter with rails  Entrance Stairs - Number of Steps: 3  Entrance Stairs - Rails: Right  Bathroom Shower/Tub: Walk-in shower  Bathroom Equipment: Grab bars in shower, Shower chair  Home Equipment: Cane, Walker - Rolling  Receives Help From: Family  ADL Assistance: Independent  Homemaking Responsibilities: No  Ambulation Assistance: Needs assistance  Transfer Assistance: Independent  Active : Yes  Mode of Transportation: Car  Education: Associate's degree  Occupation: On disability  Critical Behavior:   WNL  Strength:    Strength: Generally decreased, functional  Tone & Sensation:   Tone: Normal  Sensation: Impaired  Range Of Motion:  AROM: Generally decreased, functional  PROM: Generally decreased, functional  Functional Mobility:  Bed Mobility:   Bed Mobility Training  Bed Mobility Training: Yes  Supine to Sit: Contact-guard assistance  Sit to Supine: Supervision  Transfers:   Transfer Training  Transfer Training: Yes  Interventions: Tactile cues;Verbal cues;Demonstration  Sit to Stand: Contact-guard assistance  Stand to Sit: Stand-by assistance  Balance:   Balance  Sitting: Intact  Standing: Impaired  Standing - Static: Good  Standing - Dynamic: Fair  Ambulation/Gait Training:  Gait Training  Right Side Weight Bearing: As tolerated  Gait  Gait Training: Yes  Right Side Weight Bearing: As tolerated  Overall Level of Assistance: Stand-by assistance  Distance (ft): 100 Feet  Assistive Device: Gait belt;Walker, rolling  Interventions: Demonstration;Verbal cues  Base of Support: Shift to left  Speed/Stella: Slow  Step Length: Left shortened;Right

## 2024-08-01 NOTE — CARE COORDINATION
RENEE NW met with patient, referral initiated for hh with pt preference [facility]. Matt has accepted the patient to supply antibiotics and teaching prior to discharge. CM continues to follow for discharge planning.

## 2024-08-02 ENCOUNTER — APPOINTMENT (OUTPATIENT)
Facility: HOSPITAL | Age: 64
End: 2024-08-02
Attending: ORTHOPAEDIC SURGERY
Payer: MEDICARE

## 2024-08-02 PROBLEM — T81.49XA POSTOPERATIVE WOUND INFECTION: Status: RESOLVED | Noted: 2024-07-31 | Resolved: 2024-08-02

## 2024-08-02 LAB
ALBUMIN SERPL-MCNC: 2.7 G/DL (ref 3.4–5)
ALBUMIN/GLOB SERPL: 0.6 (ref 0.8–1.7)
ALP SERPL-CCNC: 104 U/L (ref 45–117)
ALT SERPL-CCNC: 13 U/L (ref 16–61)
ANION GAP SERPL CALC-SCNC: 6 MMOL/L (ref 3–18)
AST SERPL-CCNC: 9 U/L (ref 10–38)
BILIRUB SERPL-MCNC: 0.5 MG/DL (ref 0.2–1)
BUN SERPL-MCNC: 7 MG/DL (ref 7–18)
BUN/CREAT SERPL: 7 (ref 12–20)
CALCIUM SERPL-MCNC: 8.9 MG/DL (ref 8.5–10.1)
CHLORIDE SERPL-SCNC: 103 MMOL/L (ref 100–111)
CO2 SERPL-SCNC: 27 MMOL/L (ref 21–32)
CREAT SERPL-MCNC: 0.95 MG/DL (ref 0.6–1.3)
GLOBULIN SER CALC-MCNC: 4.5 G/DL (ref 2–4)
GLUCOSE SERPL-MCNC: 120 MG/DL (ref 74–99)
INR PPP: 1.2 (ref 0.9–1.1)
POTASSIUM SERPL-SCNC: 4.2 MMOL/L (ref 3.5–5.5)
PROT SERPL-MCNC: 7.2 G/DL (ref 6.4–8.2)
PROTHROMBIN TIME: 15.6 SEC (ref 11.9–14.9)
SODIUM SERPL-SCNC: 136 MMOL/L (ref 136–145)

## 2024-08-02 PROCEDURE — 97116 GAIT TRAINING THERAPY: CPT

## 2024-08-02 PROCEDURE — 85610 PROTHROMBIN TIME: CPT

## 2024-08-02 PROCEDURE — 36415 COLL VENOUS BLD VENIPUNCTURE: CPT

## 2024-08-02 PROCEDURE — 2580000003 HC RX 258: Performed by: ORTHOPAEDIC SURGERY

## 2024-08-02 PROCEDURE — 2500000003 HC RX 250 WO HCPCS: Performed by: INTERNAL MEDICINE

## 2024-08-02 PROCEDURE — 6360000002 HC RX W HCPCS: Performed by: INTERNAL MEDICINE

## 2024-08-02 PROCEDURE — 1100000000 HC RM PRIVATE

## 2024-08-02 PROCEDURE — 6370000000 HC RX 637 (ALT 250 FOR IP): Performed by: ORTHOPAEDIC SURGERY

## 2024-08-02 PROCEDURE — 6360000002 HC RX W HCPCS: Performed by: ORTHOPAEDIC SURGERY

## 2024-08-02 PROCEDURE — 80053 COMPREHEN METABOLIC PANEL: CPT

## 2024-08-02 PROCEDURE — 02HV33Z INSERTION OF INFUSION DEVICE INTO SUPERIOR VENA CAVA, PERCUTANEOUS APPROACH: ICD-10-PCS | Performed by: STUDENT IN AN ORGANIZED HEALTH CARE EDUCATION/TRAINING PROGRAM

## 2024-08-02 PROCEDURE — 2580000003 HC RX 258: Performed by: INTERNAL MEDICINE

## 2024-08-02 PROCEDURE — 76937 US GUIDE VASCULAR ACCESS: CPT

## 2024-08-02 RX ORDER — TRAMADOL HYDROCHLORIDE 50 MG/1
50 TABLET ORAL EVERY 6 HOURS PRN
Qty: 40 TABLET | Refills: 0 | Status: SHIPPED | OUTPATIENT
Start: 2024-08-02 | End: 2024-08-16

## 2024-08-02 RX ORDER — ACETAMINOPHEN 500 MG
500 TABLET ORAL EVERY 6 HOURS
Qty: 120 TABLET | Refills: 3 | Status: SHIPPED | OUTPATIENT
Start: 2024-08-02

## 2024-08-02 RX ORDER — HYDROMORPHONE HYDROCHLORIDE 4 MG/1
4 TABLET ORAL EVERY 6 HOURS PRN
Qty: 40 TABLET | Refills: 0 | Status: SHIPPED | OUTPATIENT
Start: 2024-08-02 | End: 2024-08-16

## 2024-08-02 RX ORDER — HEPARIN SODIUM 200 [USP'U]/100ML
1000 INJECTION, SOLUTION INTRAVENOUS ONCE
Status: COMPLETED | OUTPATIENT
Start: 2024-08-02 | End: 2024-08-02

## 2024-08-02 RX ORDER — HEPARIN SODIUM (PORCINE) LOCK FLUSH IV SOLN 100 UNIT/ML 100 UNIT/ML
300 SOLUTION INTRAVENOUS EVERY 8 HOURS PRN
Status: DISCONTINUED | OUTPATIENT
Start: 2024-08-02 | End: 2024-08-03 | Stop reason: HOSPADM

## 2024-08-02 RX ADMIN — GABAPENTIN 300 MG: 300 CAPSULE ORAL at 13:41

## 2024-08-02 RX ADMIN — ACETAMINOPHEN 500 MG: 500 TABLET ORAL at 03:28

## 2024-08-02 RX ADMIN — ACETAMINOPHEN 500 MG: 500 TABLET ORAL at 08:30

## 2024-08-02 RX ADMIN — METOPROLOL SUCCINATE 25 MG: 25 TABLET, EXTENDED RELEASE ORAL at 20:12

## 2024-08-02 RX ADMIN — VANCOMYCIN HYDROCHLORIDE 1000 MG: 1 INJECTION, POWDER, LYOPHILIZED, FOR SOLUTION INTRAVENOUS at 05:31

## 2024-08-02 RX ADMIN — ATORVASTATIN CALCIUM 20 MG: 20 TABLET, FILM COATED ORAL at 20:12

## 2024-08-02 RX ADMIN — HEPARIN SODIUM 1000 UNITS: 200 INJECTION, SOLUTION INTRAVENOUS at 10:50

## 2024-08-02 RX ADMIN — WATER 2000 MG: 1 INJECTION INTRAMUSCULAR; INTRAVENOUS; SUBCUTANEOUS at 11:43

## 2024-08-02 RX ADMIN — TRAMADOL HYDROCHLORIDE 50 MG: 50 TABLET ORAL at 21:20

## 2024-08-02 RX ADMIN — HYDROMORPHONE HYDROCHLORIDE 4 MG: 4 TABLET ORAL at 13:40

## 2024-08-02 RX ADMIN — HYDROMORPHONE HYDROCHLORIDE 4 MG: 4 TABLET ORAL at 20:11

## 2024-08-02 RX ADMIN — TRAMADOL HYDROCHLORIDE 50 MG: 50 TABLET ORAL at 08:31

## 2024-08-02 RX ADMIN — ACETAMINOPHEN 500 MG: 500 TABLET ORAL at 21:19

## 2024-08-02 RX ADMIN — CLOPIDOGREL BISULFATE 75 MG: 75 TABLET ORAL at 20:11

## 2024-08-02 RX ADMIN — Medication 300 UNITS: at 10:51

## 2024-08-02 RX ADMIN — FAMOTIDINE 20 MG: 20 TABLET, FILM COATED ORAL at 20:12

## 2024-08-02 RX ADMIN — GABAPENTIN 300 MG: 300 CAPSULE ORAL at 20:12

## 2024-08-02 RX ADMIN — ACETAMINOPHEN 500 MG: 500 TABLET ORAL at 15:06

## 2024-08-02 RX ADMIN — FAMOTIDINE 20 MG: 20 TABLET, FILM COATED ORAL at 08:31

## 2024-08-02 RX ADMIN — WATER 2000 MG: 1 INJECTION INTRAMUSCULAR; INTRAVENOUS; SUBCUTANEOUS at 05:25

## 2024-08-02 RX ADMIN — SODIUM CHLORIDE, PRESERVATIVE FREE 5 ML: 5 INJECTION INTRAVENOUS at 09:00

## 2024-08-02 RX ADMIN — SODIUM CHLORIDE, PRESERVATIVE FREE 10 ML: 5 INJECTION INTRAVENOUS at 20:14

## 2024-08-02 RX ADMIN — LIDOCAINE HYDROCHLORIDE 2 ML: 10 INJECTION, SOLUTION INFILTRATION; PERINEURAL at 10:51

## 2024-08-02 RX ADMIN — GABAPENTIN 300 MG: 300 CAPSULE ORAL at 08:30

## 2024-08-02 ASSESSMENT — PAIN SCALES - GENERAL
PAINLEVEL_OUTOF10: 5
PAINLEVEL_OUTOF10: 7
PAINLEVEL_OUTOF10: 7
PAINLEVEL_OUTOF10: 9
PAINLEVEL_OUTOF10: 8

## 2024-08-02 ASSESSMENT — PAIN DESCRIPTION - DESCRIPTORS
DESCRIPTORS: ACHING;BURNING
DESCRIPTORS: ACHING;DISCOMFORT
DESCRIPTORS: BURNING
DESCRIPTORS: THROBBING

## 2024-08-02 ASSESSMENT — PAIN DESCRIPTION - PAIN TYPE
TYPE: SURGICAL PAIN
TYPE: SURGICAL PAIN

## 2024-08-02 ASSESSMENT — PAIN DESCRIPTION - ORIENTATION
ORIENTATION: RIGHT
ORIENTATION: ANTERIOR;RIGHT
ORIENTATION: RIGHT
ORIENTATION: RIGHT

## 2024-08-02 ASSESSMENT — PAIN DESCRIPTION - LOCATION
LOCATION: KNEE

## 2024-08-02 ASSESSMENT — PAIN DESCRIPTION - FREQUENCY: FREQUENCY: CONTINUOUS

## 2024-08-02 ASSESSMENT — PAIN - FUNCTIONAL ASSESSMENT
PAIN_FUNCTIONAL_ASSESSMENT: ACTIVITIES ARE NOT PREVENTED
PAIN_FUNCTIONAL_ASSESSMENT: ACTIVITIES ARE NOT PREVENTED

## 2024-08-02 ASSESSMENT — PAIN DESCRIPTION - ONSET: ONSET: ON-GOING

## 2024-08-02 NOTE — DISCHARGE SUMMARY
Discharge Summary    Patient: Adam May               Sex: male          DOA: 7/31/2024         YOB: 1960      Age:  64 y.o.        LOS:  LOS: 2 days                Admit Date: 7/31/2024    Discharge Date: 8/3/2024    Admission Diagnoses: Postoperative retroperitoneal abscess [K68.11]  Postoperative wound infection [T81.49XA]    Discharge Diagnoses:      Discharge Condition: Good    Hospital Course: Right knee debridement; poly swap    Consults: Infectious Disease    Significant Diagnostic Studies: cultures pending    Discharge Medications:     Current Discharge Medication List        START taking these medications    Details   HYDROmorphone (DILAUDID) 4 MG tablet Take 1 tablet by mouth every 6 hours as needed for Pain for up to 14 days. Max Daily Amount: 16 mg  Qty: 40 tablet, Refills: 0    Comments: Reduce doses taken as pain becomes manageable  Associated Diagnoses: Postoperative wound infection      traMADol (ULTRAM) 50 MG tablet Take 1 tablet by mouth every 6 hours as needed for Pain for up to 14 days. Max Daily Amount: 200 mg  Qty: 40 tablet, Refills: 0    Comments: Reduce doses taken as pain becomes manageable  Associated Diagnoses: Postoperative wound infection           CONTINUE these medications which have CHANGED    Details   !! acetaminophen (TYLENOL) 500 MG tablet Take 1 tablet by mouth every 6 hours  Qty: 120 tablet, Refills: 3       !! - Potential duplicate medications found. Please discuss with provider.        CONTINUE these medications which have NOT CHANGED    Details   !! acetaminophen (TYLENOL) 500 MG tablet Take 1 tablet by mouth every 6 hours  Qty: 120 tablet, Refills: 0      clopidogrel (PLAVIX) 75 MG tablet Take 1 tablet by mouth nightly Indications: Cardiac stent      famotidine (PEPCID) 20 MG tablet Take 1 tablet by mouth 2 times daily Indications: GERD      gabapentin (NEURONTIN) 300 MG capsule Take 1 capsule by mouth 3 times daily. Indications: neuropathy

## 2024-08-02 NOTE — PROGRESS NOTES
[facility]  (A Division of [facility])    Follow-up Note      Date of Admission: 7/31/2024       Date of Note:  8/2/2024    Summary:  Mr May is a 64y AAM who underwent a revision of TKA on 6.24 (aseptic loosening of Tibial component) - initial TKA in JUN18.  Developed progressive swelling/pain to knee with difficulty ambulating.  Has had some f/c last few days; no sweats.  Seen in office earlier this week and scheduled for debridement today.  No prior (+) TSTs     Unemployed currently      CC:  \"My knee feels much better\"  Today:  Events last few days tracked daily/reviewed - pt seen today.  Feeling MUCH better since surgery and even yesterday.  Did have some f/c leading up to surgery and the night of surgery, but not since.  Appetite returning.  Mobility better.    Tm <100  No CBC today    Current Antimicrobials:    Prior Antimicrobials:  Cefazolin IV (7/31-) #2  Vancomycin IV (7/31-) #2 Cipro PO PTA  Ampicillin PO PTA       Assessment: Rec / Plan:   RTKA/PJI - POD #2  8/1:  CRP 56mg/L  Nothing growing on MICRO this AM (I called [facility]), and I don't see results of office tap earlier this week on chart yet.  He WAS taken cipro+amp PO before admission, so that certainly would have some play into why there is no growth to this point.  C acnes would also be slow to grow (this anaerobe takes 5-7d to grow normally).  Will go ahead and order PICC today and switch abx to single CAX IV daily as this kills C acnes and covers the spectrum that cipro+amp PO would cover. ->POD#2    Stop cefazolin and vanco  PICC today    CAX 2gm IV daily  Terminate/last dose - 9/11/24  Weekly labs (qMon) - CBC with diff, CMP, and quantitative CRP (fax to me [phone])  PICC care/pull after last dose 9/11    Follow up me in Wound Care clinic in 2-3wks   DMT2 - great control A1c 6.2%     HTN     CAD     BPH         MMicrobiology:             7/1 - OR - NOS (-)

## 2024-08-02 NOTE — PROGRESS NOTES
1240-  Patient had AMARILYS drain removed from the right leg, drain had 5ml of serosanginous fluid, removed drain with the tip intact. Patient tolerated the removal well. Changed right knee dressings, staples are intact and dry, applied betadine and dry mesh guaze to area, with abd pads, reapplied ace wrap. Knee immobilizer is on and intact. Bed is in the lowest position, call bell is within reach. Safety measures are in place.

## 2024-08-02 NOTE — PROGRESS NOTES
Physical Therapy    1103: Pt off the floor for PICC, will follow up as schedule permits.  Pt cleared PT last session.

## 2024-08-02 NOTE — PROGRESS NOTES
Physical Therapy Goals:  Initiated 8/2/2024 to be met within 7-10 days.  Short Term Goals  Short Term Goal 1: Patient will perform supine to/from sit with SBA  Short Term Goal 2: Patient will perform sit to/from stand with SB-CGA in preperation for gait progression  Short Term Goal 3: Patient will ambulate 100 ft with RW and SB-CGA to progress OOB mobility  Short Term Goal 4: Patient will negotiate 3 stairs with handrails and CGA to simulate home entry    [x]  Patient has met MD anh wilkes for d/c home   []  Recommend HH with 24 hour adult care   []  Benefit from additional acute PT session to address:        PHYSICAL THERAPY TREATMENT    Patient: Adam May (64 y.o. male)  Date: 8/2/2024  Diagnosis: Postoperative retroperitoneal abscess [K68.11]  Postoperative wound infection [T81.49XA] Postoperative wound infection  Procedure(s) (LRB):  RIGHT KNEE IRRIGATION & DEBRIDEMENT WITH POLY SWAP  ( FLAGGED AS INPATIENT DUE TO INSURANCE) (Right) 2 Days Post-Op  Precautions: Fall Risk, Weight Bearing, Right Lower Extremity Weight Bearing: Weight Bearing As Tolerated (c/KI),  ,  ,  ,  ,  ,    PLOF: independent, ambulatory without AD, has a RW    ASSESSMENT:  Assessment  Assessment: Pt supine in bed, 2 visitors present.  KI in place.  Utilized self assist hook technique to assist RLE in/out of bed.  Sit to stand performed with bed in lowest position and without assistance.  Ambulated 220ft with RW, step to gt pattern, steady pace, no LOB or path deviations.  Returned to room and left sitting EOB.  Pt is safe to ambulation with RW independently until d/c tomorrow.  Activity Tolerance: Patient tolerated treatment well  Discharge Recommendations: Home with Home health PT    Progression toward goals:   [x]      Improving appropriately and progressing toward goals  []      Improving slowly and progressing toward goals  []      Not making progress toward goals and plan of care will be adjusted     PLAN:  Patient  Nursing notified  [x] Caregiver present  [] Bed alarm activated  [] SCDs applied      COMMUNICATION/EDUCATION:   Patient Education  Education Given To: Patient  Education Provided: Role of Therapy;Plan of Care;Transfer Training;Family Education;Equipment;Precautions  Education Method: Verbal;Teach Back  Barriers to Learning: None  Education Outcome: Verbalized understanding;Demonstrated understanding      [facility] PTA  Minutes: 12    [facility] AM-PAC® Basic Mobility Inpatient Short Form (6-Clicks) Version 2    How much HELP from another person does the patient currently need…    (If the patient hasn't done an activity recently, how much help from another person do you think he/she would need if he/she tried?)   Total (Total A or Dep)   A Lot  (Mod to Max A)   A Little (Sup or Min A)   None (Mod I to I)   Turning from your back to your side while in a flat bed without using bedrails?   [] 1 [] 2 [] 3 [x] 4   2. Moving from lying on your back to sitting on the side of a flat bed without using bedrails?    [] 1 [] 2 [] 3 [x] 4   3. Moving to and from a bed to a chair (including a wheelchair)?   [] 1 [] 2 [x] 3 [] 4   4. Standing up from a chair using your arms (e.g., wheelchair, or bedside chair)?   [] 1 [] 2 [x] 3 [] 4   5. Walking in hospital room?   [] 1 [] 2 [x] 3 [] 4   6. Climbing 3-5 steps with a railing?+   [] 1 [] 2 [x] 3 [] 4   +If stair climbing cannot be assessed, skip item #6.  Sum responses from items 1-5.     Current research shows that an AM-PAC score of 18 (14 without stairs) or greater is associated with a discharge to the patient's home setting. Based on an AM-PAC score of 20/24 (or **/20 if omitting stairs) and their current functional mobility deficits, it is recommended that the patient have 2-3 sessions per week of Physical Therapy at d/c to increase the patient's independence.

## 2024-08-02 NOTE — DISCHARGE INSTRUCTIONS
Dr. Shay's Post Operative Instructions Total Knee Replacement    ACTIVITIES :  1. You may be up and walking about the house with your walker.  2.  Activities around the house, such as washing dishes, fixing light meals, and your own personal care are fine.   3.  Avoid strenuous activities, such as vacuuming, lifting laundry or grocery bags.   4.  Walking is the best way to rebuild strength and stamina. Start SLOWLY and gradually increase your distance.  5.  Avoid any jogging, running or excessive stair-climbing   6.  Your home physical therapist will work with you and your range-of-motion for the first 7-14 days.  After your first visit with Dr. Shay you will be scheduled for out-patient physical therapy at a site convenient for you.  7.  Follow-up with Dr. Shay in 10-14 days.    BATHING and INCISION CARE:  1. Do not remove bandage until first post-op visit in office  2. The incision may be tender to touch or feel numb: this is normal.   3.  Keep the incision clean and dry “no showering” until your follow-up appointment. The incision will be closed with sutures under the skin and the skin will be glued.   4.  Do not apply any lotions, ointments or oils on the incision.   5.  If you notice any excessive swelling, redness, or persistent drainage around the incision, notify the office immediately.    DRIVIN.  You should not drive until after your follow-up appointment.   2.  You can be in a vehicle for short distances, but if you travel any long distance, please stop about every 30 minutes and walk/stretch.   3.  You should NEVER drive while taking narcotic medication.  4.  Driving will be permitted on right knee replacements after the therapist has confirmed a range-of-motion of a 105 degrees.  Left knee replacements may drive at 2 weeks post-op.    RETURN TO WORK :  1. The decision to return to work will be determined on an individual basis.   2.  Many people who have a  strenuous job (construction, heavy labor, etc) may need to be off work for up to 12 weeks.   3.  If you need a work note, please let us know as soon as possible, and not the same day you are planning to return to work.        NUTRITION :  1.  Good nutrition is an essential part of healing.   2.  You should eat a balanced diet each day, including fruits, vegetables, dairy products and protein.   3.  Remember to drink plenty of water.   4.  If you have not had a bowel movement within 3 days of surgery, you will need to use a laxative or suppository that can be obtained over-the-counter at your local pharmacy.    MEDICATIONS -  1. You may resume the medications you were taking before surgery.  2.  You will receive a prescription for pain medication at discharge from the hospital. The pain medication works best if taken before the pain becomes severe.   3.  To reduce stomach upset, always take the medication with food.   4.  Begin to wean yourself off the pain medication during the second week after discharge.   5.  If you need a refill, please call the office during working hours at least 2 days before your prescription runs out. Do not wait until your bottle is empty to call for a refill.   6.  You will also be prescribed a blood thinner that you will take by injection for 21 days post-operatively.  7.  DO NOT drive if you are taking narcotic pain medications.    HOME HEALTH CARE:  1.   A home health care service has been set-up for you to help assist you once you leave the hospital.  2.  They will contact you either before you leave the hospital or within 24 hours once you have been discharged home.  3. A nurse will assist you with your dressing changes and a Physical Therapist with help you with your therapy needs.    CALL THE OFFICE:  If you have severe pain unrelieved by the medications;  If you have a fever of 101.0°F or greater;   If you notice excessive swelling, redness, or persistent drainage from the  incision or IV site;     [facility] office number is [phone] from 8:00am to 5:00pm Monday through Friday. After 5:00pm, on weekends, or holidays, please leave a message with our answering service and the doctor on-call will get back to you shortly.

## 2024-08-02 NOTE — PROGRESS NOTES
0730-  Patient had elevated BP  during rounds was made aware. Consult request by provider for hospitalist consult.

## 2024-08-02 NOTE — PROGRESS NOTES
[facility]   Pharmacy Pharmacokinetic Monitoring Service - Vancomycin    Consulting Provider: Dr. Siddhartha Shay   Indication: Surgical Site Infection  Target Concentration: Goal AUC/TONYA 400-600 mg*hr/L  Day of Therapy: 3  Additional Antimicrobials: Cefazolin    Pertinent Laboratory Values:   Wt Readings from Last 1 Encounters:   07/30/24 85.7 kg (189 lb)     Temp Readings from Last 1 Encounters:   08/02/24 98.5 °F (36.9 °C) (Oral)     Estimated Creatinine Clearance: 85 mL/min (based on SCr of 0.95 mg/dL).  Recent Labs     08/01/24  0529 08/02/24  0319   CREATININE 0.90 0.95   BUN 6* 7   WBC 10.1  --          Pertinent Cultures:  Culture Date Source Results   7/31 7/31 Wound  Fungus IP  IP   MRSA Nasal Swab: N/A. Non-respiratory infection.    Recent vancomycin administrations                     vancomycin (VANCOCIN) 1,000 mg in sodium chloride 0.9 % 250 mL (vial-mate) IVPB (mg) 1,000 mg New Bag 08/01/24 0618     1,000 mg New Bag 07/31/24 2014                      Plan:  Current dosing regimen is therapeutic  Continue current dose: Vancomycin 1,000 mg IV every 12 hours         AUC/Tr= 505 mg/L.hr/14.8mg/L  Repeat vancomycin concentration ordered for 8/3 @ 0500  Pharmacy will continue to monitor patient and adjust therapy as indicated    Thank you for the consult,  FAN CEDEÑO RPH, PharmD  8/2/2024 8:38 AM

## 2024-08-02 NOTE — PLAN OF CARE
Problem: Chronic Conditions and Co-morbidities  Goal: Patient's chronic conditions and co-morbidity symptoms are monitored and maintained or improved  Outcome: Progressing  Flowsheets (Taken 8/2/2024 0457)  Care Plan - Patient's Chronic Conditions and Co-Morbidity Symptoms are Monitored and Maintained or Improved:   Monitor and assess patient's chronic conditions and comorbid symptoms for stability, deterioration, or improvement   Collaborate with multidisciplinary team to address chronic and comorbid conditions and prevent exacerbation or deterioration     Problem: Pain  Goal: Verbalizes/displays adequate comfort level or baseline comfort level  Outcome: Progressing  Flowsheets (Taken 8/2/2024 0457)  Verbalizes/displays adequate comfort level or baseline comfort level:   Encourage patient to monitor pain and request assistance   Assess pain using appropriate pain scale   Administer analgesics based on type and severity of pain and evaluate response   Implement non-pharmacological measures as appropriate and evaluate response   Consider cultural and social influences on pain and pain management     Problem: Safety - Adult  Goal: Free from fall injury  Outcome: Progressing

## 2024-08-02 NOTE — PLAN OF CARE
Problem: Pain  Goal: Verbalizes/displays adequate comfort level or baseline comfort level  8/2/2024 1700 by Merissa Napoles, RN  Outcome: Progressing  8/2/2024 0457 by Kaylah Day, RN  Outcome: Progressing  Flowsheets (Taken 8/2/2024 0457)  Verbalizes/displays adequate comfort level or baseline comfort level:   Encourage patient to monitor pain and request assistance   Assess pain using appropriate pain scale   Administer analgesics based on type and severity of pain and evaluate response   Implement non-pharmacological measures as appropriate and evaluate response   Consider cultural and social influences on pain and pain management     Problem: Safety - Adult  Goal: Free from fall injury  8/2/2024 1700 by Merissa Napoles, RN  Outcome: Progressing  8/2/2024 0457 by Kaylah Day, RN  Outcome: Progressing     Problem: ABCDS Injury Assessment  Goal: Absence of physical injury  Outcome: Progressing

## 2024-08-02 NOTE — CARE COORDINATION
CM NW met with patient at bedside. Pt has completed teaching with [employer]. Pt preference [facility]  has also accepted the patient with start of care scheduled for 8/6/2024. Pt spouse or daughter to provide transport at discharge. CM available if needs arise.

## 2024-08-02 NOTE — PROGRESS NOTES
Ortho POD2    Cultures no growth so far  Office labs were faxed but not in chart    Alert  VSS Systolic BP running high  RLE N/V intact  Xray right knee good    Stable    Plan: PICC home on IVAB Sat

## 2024-08-02 NOTE — PROGRESS NOTES
08/02/24 0330   Vital Signs   Temp 98.7 °F (37.1 °C)   Temp Source Oral   Pulse 76   Heart Rate Source Monitor   Respirations 17   BP (!) 149/106   MAP (Calculated) 120   BP Location Right upper arm   BP Method Automatic   Patient Position Semi fowlers      08/02/24 0345   Treatment Team Notification   Reason for Communication Abnormal vitals  (Hypertension)   Name of Team Member Notified MD Bueno   Treatment Team Role Attending Provider  (MD on call)   Method of Communication Page  (via answering service)   Response Waiting for response   Notification Time 0345     Callback received at 0353. Will d/c ivf per MD (currently stopped), continue to monitor and relay information during RN handoff.

## 2024-08-03 VITALS
WEIGHT: 189 LBS | OXYGEN SATURATION: 98 % | BODY MASS INDEX: 26.46 KG/M2 | HEIGHT: 71 IN | TEMPERATURE: 98.1 F | HEART RATE: 69 BPM | SYSTOLIC BLOOD PRESSURE: 129 MMHG | DIASTOLIC BLOOD PRESSURE: 86 MMHG | RESPIRATION RATE: 17 BRPM

## 2024-08-03 LAB
ALBUMIN SERPL-MCNC: 2.5 G/DL (ref 3.4–5)
ALBUMIN/GLOB SERPL: 0.6 (ref 0.8–1.7)
ALP SERPL-CCNC: 91 U/L (ref 45–117)
ALT SERPL-CCNC: 22 U/L (ref 16–61)
ANION GAP SERPL CALC-SCNC: 6 MMOL/L (ref 3–18)
AST SERPL-CCNC: 15 U/L (ref 10–38)
BILIRUB SERPL-MCNC: 0.4 MG/DL (ref 0.2–1)
BUN SERPL-MCNC: 10 MG/DL (ref 7–18)
BUN/CREAT SERPL: 11 (ref 12–20)
CALCIUM SERPL-MCNC: 8.7 MG/DL (ref 8.5–10.1)
CHLORIDE SERPL-SCNC: 102 MMOL/L (ref 100–111)
CO2 SERPL-SCNC: 27 MMOL/L (ref 21–32)
CREAT SERPL-MCNC: 0.94 MG/DL (ref 0.6–1.3)
GLOBULIN SER CALC-MCNC: 4.3 G/DL (ref 2–4)
GLUCOSE SERPL-MCNC: 131 MG/DL (ref 74–99)
POTASSIUM SERPL-SCNC: 3.8 MMOL/L (ref 3.5–5.5)
PROT SERPL-MCNC: 6.8 G/DL (ref 6.4–8.2)
SODIUM SERPL-SCNC: 135 MMOL/L (ref 136–145)

## 2024-08-03 PROCEDURE — 80053 COMPREHEN METABOLIC PANEL: CPT

## 2024-08-03 PROCEDURE — 6370000000 HC RX 637 (ALT 250 FOR IP): Performed by: ORTHOPAEDIC SURGERY

## 2024-08-03 PROCEDURE — 2580000003 HC RX 258: Performed by: INTERNAL MEDICINE

## 2024-08-03 PROCEDURE — 6360000002 HC RX W HCPCS: Performed by: INTERNAL MEDICINE

## 2024-08-03 PROCEDURE — 2580000003 HC RX 258: Performed by: ORTHOPAEDIC SURGERY

## 2024-08-03 PROCEDURE — 36415 COLL VENOUS BLD VENIPUNCTURE: CPT

## 2024-08-03 RX ADMIN — WATER 2000 MG: 1 INJECTION INTRAMUSCULAR; INTRAVENOUS; SUBCUTANEOUS at 10:34

## 2024-08-03 RX ADMIN — ACETAMINOPHEN 500 MG: 500 TABLET ORAL at 09:30

## 2024-08-03 RX ADMIN — ACETAMINOPHEN 500 MG: 500 TABLET ORAL at 02:29

## 2024-08-03 RX ADMIN — ACETAMINOPHEN 500 MG: 500 TABLET ORAL at 14:33

## 2024-08-03 RX ADMIN — SODIUM CHLORIDE, PRESERVATIVE FREE 10 ML: 5 INJECTION INTRAVENOUS at 09:38

## 2024-08-03 RX ADMIN — GABAPENTIN 300 MG: 300 CAPSULE ORAL at 14:30

## 2024-08-03 RX ADMIN — GABAPENTIN 300 MG: 300 CAPSULE ORAL at 09:30

## 2024-08-03 RX ADMIN — HYDROMORPHONE HYDROCHLORIDE 4 MG: 4 TABLET ORAL at 06:53

## 2024-08-03 RX ADMIN — FAMOTIDINE 20 MG: 20 TABLET, FILM COATED ORAL at 09:30

## 2024-08-03 ASSESSMENT — PAIN SCALES - WONG BAKER: WONGBAKER_NUMERICALRESPONSE: NO HURT

## 2024-08-03 ASSESSMENT — PAIN SCALES - GENERAL
PAINLEVEL_OUTOF10: 0
PAINLEVEL_OUTOF10: 10

## 2024-08-03 NOTE — PROGRESS NOTES
Progress Note POD #      Patient: Adam May               Sex: male          DOA: 7/31/2024         YOB: 1960      Surgery: Procedure(s):  RIGHT KNEE IRRIGATION & DEBRIDEMENT WITH POLY SWAP  ( FLAGGED AS INPATIENT DUE TO INSURANCE)           LOS: 3 days               Subjective:     No new complaints  Bleeding from drain hole      Objective:      Visit Vitals  BP (!) 143/89   Pulse 69   Temp 97.6 °F (36.4 °C) (Oral)   Resp 17   Ht 1.803 m (5' 11\")   Wt 85.7 kg (189 lb)   SpO2 96%   BMI 26.36 kg/m²       Physical Exam:  Neurological: motor strength: 5/5 in lower extremities bilaterally                          sensation: intact to light touch  Knee wound C/D/I   Drain hole laterally is seeping dark blood    Patient mobility  Current functional level: Assistance with the following:, Mobility  PT AM-PAC: 20 /24  OT AM-PAC: 19 /24      Intake and Output:  Current Shift:  No intake/output data recorded.  Last three shifts:  08/01 1901 - 08/03 0700  In: 1810.9 [P.O.:960; I.V.:600.9]  Out: 2745 [Urine:2700; Drains:20]      Lab/Data Reviewed:  Lab Results   Component Value Date/Time    WBC 10.1 08/01/2024 05:29 AM    HGB 8.1 08/01/2024 05:29 AM    HCT 25.3 08/01/2024 05:29 AM     08/01/2024 05:29 AM    MCV 75.1 08/01/2024 05:29 AM     No results found for: \"APTT\"  Lab Results   Component Value Date/Time    INR 1.2 08/02/2024 03:19 AM      Recent Labs     08/01/24  0529   HGB 8.1*           Assessment/Plan     Principal Problem (Resolved):    Postoperative wound infection  Active Problems:    * No active hospital problems. *      1. Stable  2. OOB with PT  3. D/C Planning  4. D/C PCA  5. D/C ramos  6. Change dressing.  Check dressing prior to discharge home.  7.Discharge to home after being cleared by P.T  8. Follow-up in 10 days at [facility] with Dr. Shay

## 2024-08-03 NOTE — PROGRESS NOTES
0730  During change of shift report this morning with the night nurse, patient bleeding from the site the drain was taken off. Dressing changed, and replaced.    0925  This nurse was called to the bedside by patient about the bleeding site. Patient still bleeding from the site the drain was taken out. Dr. Bueno notified. Dressing changed per Dr. Bueno's order. Patient on close monitor and observation.    1030  Reassessing patient. Patient still bleeding. Supervisor notified. Dressing changed. Quick clot hemostatic dressing applied as instructed by the supervisor.    1200  Reassessed patient. Patient no longer bleeding.    1408  Patient and spouse were given discharge paperwork. Paperwork reviewed verbally, and all questions answered. PIV removed according to [facility] policies and procedures with no complaints. Patient was transported downstairs in a wheelchair with staff. No signs of distress at this time.

## 2024-08-03 NOTE — PROGRESS NOTES
This writer received Handoff in regards to:  Adam May (64 y.o., male)    : 1960    Admitted: 2024     Report on Adam May ,(64 y.o., male), was received from Offgoing nurse,Merissa    All questions and concerns of this writer, Patient, and/or Family were addressed at this time. Report on patient's status and plan of care was given.      Patient Axo4; Hard of hearing  Complained of throbbing in knee.  0300 previous Amarilys drain site fully saturated 2 abd pads and 4x4s.     Changed at this time with:  Cleansed with saline.  Folded 4x4   transparent dressing over the site of the previous AMARILYS drain  Dressing is covered with 1 abd and another Abd covering wound. And ace wrapped     0715 Site bleeding. Informed oncoming of dressing change.

## 2024-08-05 LAB
BACTERIA SPEC CULT: ABNORMAL
BACTERIA SPEC CULT: ABNORMAL
BACTERIA SPEC CULT: NORMAL
GRAM STN SPEC: ABNORMAL
GRAM STN SPEC: ABNORMAL
SERVICE CMNT-IMP: ABNORMAL
SERVICE CMNT-IMP: NORMAL

## 2024-08-12 LAB
BACTERIA SPEC CULT: NORMAL
SERVICE CMNT-IMP: NORMAL

## 2024-08-19 LAB
BACTERIA SPEC CULT: NORMAL
SERVICE CMNT-IMP: NORMAL

## 2024-08-26 LAB
BACTERIA SPEC CULT: NORMAL
SERVICE CMNT-IMP: NORMAL

## 2024-08-27 ENCOUNTER — HOSPITAL ENCOUNTER (OUTPATIENT)
Facility: HOSPITAL | Age: 64
Discharge: HOME | End: 2024-08-27
Attending: PODIATRIST | Admitting: PODIATRIST
Payer: MEDICARE

## 2024-08-27 DIAGNOSIS — L97.913 DIABETIC ULCER OF RIGHT LOWER LEG ASSOCIATED WITH TYPE 2 DIABETES MELLITUS, WITH NECROSIS OF MUSCLE (HCC): ICD-10-CM

## 2024-08-27 DIAGNOSIS — E11.622 DIABETIC ULCER OF RIGHT LOWER LEG ASSOCIATED WITH TYPE 2 DIABETES MELLITUS, WITH NECROSIS OF MUSCLE (HCC): ICD-10-CM

## 2024-08-27 DIAGNOSIS — T81.31XA WOUND DISRUPTION, POST-OP, SKIN, INITIAL ENCOUNTER: Primary | ICD-10-CM

## 2024-08-27 PROBLEM — E10.622: Status: ACTIVE | Noted: 2024-08-27

## 2024-08-27 PROBLEM — L97.923: Status: ACTIVE | Noted: 2024-08-27

## 2024-08-27 PROCEDURE — 6370000000 HC RX 637 (ALT 250 FOR IP): Performed by: PODIATRIST

## 2024-08-27 PROCEDURE — 11042 DBRDMT SUBQ TIS 1ST 20SQCM/<: CPT

## 2024-08-27 RX ORDER — BACITRACIN ZINC AND POLYMYXIN B SULFATE 500; 1000 [USP'U]/G; [USP'U]/G
OINTMENT TOPICAL ONCE
OUTPATIENT
Start: 2024-08-27 | End: 2024-08-27

## 2024-08-27 RX ORDER — BACITRACIN ZINC AND POLYMYXIN B SULFATE 500; 1000 [USP'U]/G; [USP'U]/G
OINTMENT TOPICAL ONCE
Status: CANCELLED | OUTPATIENT
Start: 2024-08-27 | End: 2024-08-27

## 2024-08-27 RX ORDER — LIDOCAINE 40 MG/G
CREAM TOPICAL ONCE
Status: CANCELLED | OUTPATIENT
Start: 2024-08-27 | End: 2024-08-27

## 2024-08-27 RX ORDER — TRIAMCINOLONE ACETONIDE 1 MG/G
OINTMENT TOPICAL ONCE
OUTPATIENT
Start: 2024-08-27 | End: 2024-08-27

## 2024-08-27 RX ORDER — LIDOCAINE HYDROCHLORIDE 40 MG/ML
SOLUTION TOPICAL ONCE
OUTPATIENT
Start: 2024-08-27 | End: 2024-08-27

## 2024-08-27 RX ORDER — NEOMYCIN/BACITRACIN/POLYMYXINB 3.5-400-5K
OINTMENT (GRAM) TOPICAL ONCE
Status: CANCELLED | OUTPATIENT
Start: 2024-08-27 | End: 2024-08-27

## 2024-08-27 RX ORDER — LIDOCAINE HYDROCHLORIDE 40 MG/ML
SOLUTION TOPICAL ONCE
Status: CANCELLED | OUTPATIENT
Start: 2024-08-27 | End: 2024-08-27

## 2024-08-27 RX ORDER — SILVER SULFADIAZINE 10 MG/G
CREAM TOPICAL ONCE
OUTPATIENT
Start: 2024-08-27 | End: 2024-08-27

## 2024-08-27 RX ORDER — SILVER SULFADIAZINE 10 MG/G
CREAM TOPICAL ONCE
Status: CANCELLED | OUTPATIENT
Start: 2024-08-27 | End: 2024-08-27

## 2024-08-27 RX ORDER — GENTAMICIN SULFATE 1 MG/G
OINTMENT TOPICAL ONCE
Status: CANCELLED | OUTPATIENT
Start: 2024-08-27 | End: 2024-08-27

## 2024-08-27 RX ORDER — LIDOCAINE HYDROCHLORIDE 20 MG/ML
JELLY TOPICAL ONCE
Status: CANCELLED | OUTPATIENT
Start: 2024-08-27 | End: 2024-08-27

## 2024-08-27 RX ORDER — NEOMYCIN/BACITRACIN/POLYMYXINB 3.5-400-5K
OINTMENT (GRAM) TOPICAL ONCE
OUTPATIENT
Start: 2024-08-27 | End: 2024-08-27

## 2024-08-27 RX ORDER — GENTAMICIN SULFATE 1 MG/G
OINTMENT TOPICAL ONCE
OUTPATIENT
Start: 2024-08-27 | End: 2024-08-27

## 2024-08-27 RX ORDER — BETAMETHASONE DIPROPIONATE 0.5 MG/G
CREAM TOPICAL ONCE
OUTPATIENT
Start: 2024-08-27 | End: 2024-08-27

## 2024-08-27 RX ORDER — LIDOCAINE 50 MG/G
OINTMENT TOPICAL ONCE
Status: CANCELLED | OUTPATIENT
Start: 2024-08-27 | End: 2024-08-27

## 2024-08-27 RX ORDER — SODIUM CHLOR/HYPOCHLOROUS ACID 0.033 %
SOLUTION, IRRIGATION IRRIGATION ONCE
Status: CANCELLED | OUTPATIENT
Start: 2024-08-27 | End: 2024-08-27

## 2024-08-27 RX ORDER — BETAMETHASONE DIPROPIONATE 0.5 MG/G
CREAM TOPICAL ONCE
Status: CANCELLED | OUTPATIENT
Start: 2024-08-27 | End: 2024-08-27

## 2024-08-27 RX ORDER — CLOBETASOL PROPIONATE 0.5 MG/G
OINTMENT TOPICAL ONCE
Status: CANCELLED | OUTPATIENT
Start: 2024-08-27 | End: 2024-08-27

## 2024-08-27 RX ORDER — LIDOCAINE 50 MG/G
OINTMENT TOPICAL ONCE
OUTPATIENT
Start: 2024-08-27 | End: 2024-08-27

## 2024-08-27 RX ORDER — LIDOCAINE HYDROCHLORIDE 20 MG/ML
JELLY TOPICAL ONCE
OUTPATIENT
Start: 2024-08-27 | End: 2024-08-27

## 2024-08-27 RX ORDER — MUPIROCIN 20 MG/G
OINTMENT TOPICAL ONCE
OUTPATIENT
Start: 2024-08-27 | End: 2024-08-27

## 2024-08-27 RX ORDER — MUPIROCIN 20 MG/G
OINTMENT TOPICAL ONCE
Status: CANCELLED | OUTPATIENT
Start: 2024-08-27 | End: 2024-08-27

## 2024-08-27 RX ORDER — TRIAMCINOLONE ACETONIDE 1 MG/G
OINTMENT TOPICAL ONCE
Status: CANCELLED | OUTPATIENT
Start: 2024-08-27 | End: 2024-08-27

## 2024-08-27 RX ORDER — GINSENG 100 MG
CAPSULE ORAL ONCE
Status: CANCELLED | OUTPATIENT
Start: 2024-08-27 | End: 2024-08-27

## 2024-08-27 RX ORDER — LIDOCAINE 40 MG/G
CREAM TOPICAL ONCE
OUTPATIENT
Start: 2024-08-27 | End: 2024-08-27

## 2024-08-27 RX ORDER — SODIUM CHLOR/HYPOCHLOROUS ACID 0.033 %
SOLUTION, IRRIGATION IRRIGATION ONCE
OUTPATIENT
Start: 2024-08-27 | End: 2024-08-27

## 2024-08-27 RX ORDER — CLOBETASOL PROPIONATE 0.5 MG/G
OINTMENT TOPICAL ONCE
OUTPATIENT
Start: 2024-08-27 | End: 2024-08-27

## 2024-08-27 RX ORDER — GINSENG 100 MG
CAPSULE ORAL ONCE
OUTPATIENT
Start: 2024-08-27 | End: 2024-08-27

## 2024-08-27 RX ORDER — LIDOCAINE HYDROCHLORIDE 20 MG/ML
JELLY TOPICAL ONCE
Status: COMPLETED | OUTPATIENT
Start: 2024-08-27 | End: 2024-08-27

## 2024-08-27 RX ADMIN — LIDOCAINE HYDROCHLORIDE: 20 JELLY TOPICAL at 16:41

## 2024-08-27 ASSESSMENT — PAIN SCALES - GENERAL: PAINLEVEL_OUTOF10: 7

## 2024-08-27 ASSESSMENT — PAIN DESCRIPTION - LOCATION: LOCATION: KNEE

## 2024-08-27 ASSESSMENT — PAIN DESCRIPTION - ORIENTATION: ORIENTATION: RIGHT

## 2024-08-27 NOTE — WOUND CARE
[facility]   History and Physical Note   Referring Provider: Dr. Shay  Reason for Referral: Post op Knee wound     Adam May  MEDICAL RECORD NUMBER:  [id number]  AGE: 64 y.o.   GENDER: male  : 1960  EPISODE DATE:  2024    Chief complaint and reason for visit:     No chief complaint on file.        HISTORY of PRESENT ILLNESS HPI     Adam May is a 64 y.o. male who presents today for an initial evaluation of a wound/ulcer. Patient is new to the wound center. Wound duration:  2 month(s). On IV Abx under Infectious disease Dr. Winters     History of Wound Context: post op Right Knee revision wound  Pertinent associated symptoms: pain severity: moderate, pain quality: throbbing, redness, swelling, and skin discoloration    PAST MEDICAL HISTORY        Diagnosis Date    CAD (coronary artery disease)     Dr. Conn    Deaf, left     no hearing aids    Hyperlipidemia     Hypertension     before     Osteoarthritis     multiple joints    Wears dentures     bilateral    Wears glasses        PAST SURGICAL HISTORY  Past Surgical History:   Procedure Laterality Date    COLONOSCOPY  2018    CORONARY ANGIOPLASTY WITH STENT PLACEMENT      Dr. Conn    ENDOSCOPY, COLON, DIAGNOSTIC  2018    KNEE ARTHROPLASTY Right 2018    KNEE SURGERY Right 2018    wound infection    REVISION TOTAL KNEE ARTHROPLASTY Right 2024    RIGHT TOTAL KNEE REVISION **INPATIENT STATUS DUE TO MEDICARE** performed by Siddhartha Shay MD at [facility] MAIN OR    REVISION TOTAL KNEE ARTHROPLASTY Right 2024    RIGHT KNEE IRRIGATION & DEBRIDEMENT WITH POLY SWAP  ( FLAGGED AS INPATIENT DUE TO INSURANCE) performed by Siddhartha Shay MD at [facility] MAIN OR       FAMILY HISTORY  No family history on file.    SOCIAL HISTORY  Social History     Tobacco Use    Smoking status: Every Day     Current packs/day: 0.50     Types: Cigarettes    Smokeless tobacco: Never    Tobacco comments:     No smoking

## 2024-08-29 VITALS
SYSTOLIC BLOOD PRESSURE: 130 MMHG | OXYGEN SATURATION: 100 % | HEART RATE: 68 BPM | DIASTOLIC BLOOD PRESSURE: 79 MMHG | TEMPERATURE: 98 F | RESPIRATION RATE: 18 BRPM

## 2024-08-29 NOTE — FLOWSHEET NOTE
08/27/24 1704   Wound 08/27/24 Knee Right   Date First Assessed/Time First Assessed: 08/27/24 1703   Present on Original Admission: Yes  Wound Approximate Age at First Assessment (Weeks): 3 weeks  Primary Wound Type: Surgical Type  Location: Knee  Wound Location Orientation: Right   Wound Image     Wound Etiology Surgical   Dressing Status New dressing applied   Wound Cleansed Vashe   Dressing/Treatment Moisten with saline;Pharmaceutical agent (see MAR);Roll gauze;Ace wrap  (santyl applied)   Offloading for Diabetic Foot Ulcers Offloading not required   Dressing Change Due 09/03/24   Wound Length (cm) 2 cm   Wound Width (cm) 2.2 cm   Wound Depth (cm) 0.4 cm   Wound Surface Area (cm^2) 4.4 cm^2   Wound Volume (cm^3) 1.76 cm^3   Post-Procedure Length (cm) 2.3 cm   Post-Procedure Width (cm) 2.3 cm   Post-Procedure Depth (cm) 0.4 cm   Post-Procedure Surface Area (cm^2) 5.29 cm^2   Post-Procedure Volume (cm^3) 2.116 cm^3   Wound Assessment Devitalized tissue   Drainage Amount Moderate (25-50%)   Drainage Description Serosanguinous   Odor None   Pricila-wound Assessment Edematous   Margins Defined edges   Wound Thickness Description not for Pressure Injury Full thickness

## 2024-08-29 NOTE — DISCHARGE INSTRUCTIONS
Discharge Instructions from  Wound Care Clinic at [facility]   [address]   [address]  [place] [address] [phone] Fax [phone]    NAME:  Adam May  YOB: 1960  MEDICAL RECORD NUMBER:  [id number]  DATE: 8/27/2024    Wound Cleansing:   Do not scrub or use excessive force.  Cleanse wound prior to applying a clean dressing with:  [x] Normal Saline [] Keep Wound Dry in Shower    [] Wound Cleanser   [] Cleanse wound with Mild Soap & Water  [] May Shower at Discharge   [] Other:      Topical Treatments:  Do not apply lotions, creams, or ointments to wound bed unless directed.   [] Apply moisturizing lotion to skin surrounding the wound prior to dressing change.  [] Apply antifungal ointment to skin surrounding the wound prior to dressing change.  [] Apply thin film of moisture barrier ointment to skin immediately around wound.  [] Other:       Dressings:           Wound Location Right Knee   [x] Apply Primary Dressing:       [] MediHoney Gel [] Alginate with Silver [] Alginate   [] Collagen [] Collagen with Silver   [x] Santyl with Moisten saline gauze     [] Hydrocolloid   [] MediHoney Alginate [] Foam with Silver   [] Foam   [] Hydrofera Blue    [] Mepilex Border    [] Moisten with Saline [] Hydrogel [] Mepitel     [] Bactroban/Mupirocin [] Polysporin  [] Other:    [] Pack wound loosely with  [] Iodoform   [] Plain Packing  [] Other   [x] Cover and Secure with:     [] Gauze [] Amy [] Kerlix   [] Ace Wrap [] Cover Roll Tape [] ABD     [x] Other: Silicone border or cover dressing   Avoid contact of tape with skin.  [x] Change dressing: [x] Daily    [] Every Other Day [] Three times per week   [] Once a week [] Do Not Change Dressing   [] Other:       Off-Loading:   [] Off-loading when [] walking  [] in bed [] sitting  [] Total non-weight bearing  [] Right Leg  [] Left Leg   [] Assistive Device [] Walker [] Cane  [] Wheelchair  [] Crutches   [] Surgical shoe    []

## 2024-09-02 LAB
BACTERIA SPEC CULT: NORMAL
SERVICE CMNT-IMP: NORMAL

## 2024-10-01 ENCOUNTER — HOSPITAL ENCOUNTER (OUTPATIENT)
Facility: HOSPITAL | Age: 64
Discharge: HOME | End: 2024-10-01
Attending: PODIATRIST | Admitting: PODIATRIST
Payer: MEDICARE

## 2024-10-01 VITALS
TEMPERATURE: 99.1 F | SYSTOLIC BLOOD PRESSURE: 148 MMHG | DIASTOLIC BLOOD PRESSURE: 84 MMHG | HEART RATE: 66 BPM | RESPIRATION RATE: 18 BRPM

## 2024-10-01 DIAGNOSIS — T81.31XA WOUND DISRUPTION, POST-OP, SKIN, INITIAL ENCOUNTER: ICD-10-CM

## 2024-10-01 DIAGNOSIS — L97.913 DIABETIC ULCER OF RIGHT LOWER LEG ASSOCIATED WITH TYPE 2 DIABETES MELLITUS, WITH NECROSIS OF MUSCLE (HCC): Primary | ICD-10-CM

## 2024-10-01 DIAGNOSIS — E11.622 DIABETIC ULCER OF RIGHT LOWER LEG ASSOCIATED WITH TYPE 2 DIABETES MELLITUS, WITH NECROSIS OF MUSCLE (HCC): Primary | ICD-10-CM

## 2024-10-01 PROCEDURE — 11043 DBRDMT MUSC&/FSCA 1ST 20/<: CPT

## 2024-10-01 RX ORDER — BETAMETHASONE DIPROPIONATE 0.5 MG/G
CREAM TOPICAL ONCE
OUTPATIENT
Start: 2024-10-01 | End: 2024-10-01

## 2024-10-01 RX ORDER — BACITRACIN ZINC AND POLYMYXIN B SULFATE 500; 1000 [USP'U]/G; [USP'U]/G
OINTMENT TOPICAL ONCE
OUTPATIENT
Start: 2024-10-01 | End: 2024-10-01

## 2024-10-01 RX ORDER — TRIAMCINOLONE ACETONIDE 1 MG/G
OINTMENT TOPICAL ONCE
OUTPATIENT
Start: 2024-10-01 | End: 2024-10-01

## 2024-10-01 RX ORDER — LIDOCAINE 40 MG/G
CREAM TOPICAL ONCE
OUTPATIENT
Start: 2024-10-01 | End: 2024-10-01

## 2024-10-01 RX ORDER — CLOBETASOL PROPIONATE 0.5 MG/G
OINTMENT TOPICAL ONCE
OUTPATIENT
Start: 2024-10-01 | End: 2024-10-01

## 2024-10-01 RX ORDER — GINSENG 100 MG
CAPSULE ORAL ONCE
OUTPATIENT
Start: 2024-10-01 | End: 2024-10-01

## 2024-10-01 RX ORDER — NEOMYCIN/BACITRACIN/POLYMYXINB 3.5-400-5K
OINTMENT (GRAM) TOPICAL ONCE
OUTPATIENT
Start: 2024-10-01 | End: 2024-10-01

## 2024-10-01 RX ORDER — SODIUM CHLOR/HYPOCHLOROUS ACID 0.033 %
SOLUTION, IRRIGATION IRRIGATION ONCE
OUTPATIENT
Start: 2024-10-01 | End: 2024-10-01

## 2024-10-01 RX ORDER — GENTAMICIN SULFATE 1 MG/G
OINTMENT TOPICAL ONCE
OUTPATIENT
Start: 2024-10-01 | End: 2024-10-01

## 2024-10-01 RX ORDER — SILVER SULFADIAZINE 10 MG/G
CREAM TOPICAL ONCE
OUTPATIENT
Start: 2024-10-01 | End: 2024-10-01

## 2024-10-01 RX ORDER — LIDOCAINE HYDROCHLORIDE 20 MG/ML
JELLY TOPICAL ONCE
OUTPATIENT
Start: 2024-10-01 | End: 2024-10-01

## 2024-10-01 RX ORDER — LIDOCAINE HYDROCHLORIDE 40 MG/ML
SOLUTION TOPICAL ONCE
OUTPATIENT
Start: 2024-10-01 | End: 2024-10-01

## 2024-10-01 RX ORDER — LIDOCAINE 50 MG/G
OINTMENT TOPICAL ONCE
OUTPATIENT
Start: 2024-10-01 | End: 2024-10-01

## 2024-10-01 RX ORDER — MUPIROCIN 20 MG/G
OINTMENT TOPICAL ONCE
OUTPATIENT
Start: 2024-10-01 | End: 2024-10-01

## 2024-10-01 ASSESSMENT — PAIN SCALES - GENERAL: PAINLEVEL_OUTOF10: 6

## 2024-10-01 NOTE — WOUND CARE
Debridement Wound Care        Problem List Items Addressed This Visit    None      Procedure Note  Indications:  Based on my examination of this patient's wound(s)/ulcer(s) today, debridement is  required to promote healing and evaluate the wound base.    Performed by: Sivakumar Banks DPM    Consent obtained: Yes    Time out taken: Yes    Debridement: excisional    Using curette the wound(s)/ulcer(s) was/were sharply debrided down through and including the removal of    epidermis, dermis, subcutaneous tissue, and muscle/fascia    Devitalized Tissue Debrided: fibrin, biofilm, and slough    Pre Debridement Measurements:  Are located in the Wound/Ulcer Documentation Flow Sheet    Other post op ulcer     Wound/Ulcer #: 1    Post Debridement Measurements:  Wound/Ulcer Descriptions are Pre Debridement except measurements:    Wound Care Documentation:  Wound 08/27/24 Knee Right (Active)   Wound Image    08/27/24 1704   Wound Etiology Surgical 08/27/24 1704   Dressing Status New dressing applied 08/27/24 1704   Wound Cleansed Vashe 08/27/24 1704   Dressing/Treatment Moisten with saline;Pharmaceutical agent (see MAR);Roll gauze;Ace wrap 08/27/24 1704   Offloading for Diabetic Foot Ulcers Offloading not required 08/27/24 1704   Dressing Change Due 09/03/24 08/27/24 1704   Wound Length (cm) 0.9 cm 10/01/24 1324   Wound Width (cm) 2 cm 10/01/24 1324   Wound Depth (cm) 0.1 cm 10/01/24 1324   Wound Surface Area (cm^2) 1.8 cm^2 10/01/24 1324   Change in Wound Size % (l*w) 59.09 10/01/24 1324   Wound Volume (cm^3) 0.18 cm^3 10/01/24 1324   Wound Healing % 90 10/01/24 1324   Post-Procedure Length (cm) 2.3 cm 08/27/24 1704   Post-Procedure Width (cm) 2.3 cm 08/27/24 1704   Post-Procedure Depth (cm) 0.4 cm 08/27/24 1704   Post-Procedure Surface Area (cm^2) 5.29 cm^2 08/27/24 1704   Post-Procedure Volume (cm^3) 2.116 cm^3 08/27/24 1704   Wound Assessment Devitalized tissue 08/27/24 1704   Drainage Amount Moderate (25-50%) 08/27/24

## 2024-10-01 NOTE — DISCHARGE INSTRUCTIONS
Discharge Instructions from  [place] [address] [phone] Fax [phone]    Do not remove dressing     Return Appointment:  [] Wound and dressing supply provider:   [] ECF or Home Healthcare:  [] Wound Assessment: [] Physician or NP scheduled for Wound Assessment:   [x] Return Appointment: With MD  in  1 Week(s)  [] Ordered tests:       Wound Care Center Information: Should you experience any significant changes in your wound(s) or have questions about your wound care, please contact the [facility] at MONDAY - FRIDAY 8:00 am - 4:30.  If you need help with your wound outside these hours and cannot wait until we are again available, contact your PCP or go to the hospital emergency room.     PLEASE NOTE: IF YOU ARE UNABLE TO OBTAIN WOUND SUPPLIES, CONTINUE TO USE THE SUPPLIES YOU HAVE AVAILABLE UNTIL YOU ARE ABLE TO REACH US. IT IS MOST IMPORTANT TO KEEP THE WOUND COVERED AT ALL TIMES.

## 2024-10-01 NOTE — FLOWSHEET NOTE
10/01/24 1324   Wound 08/27/24 Knee Right   Date First Assessed/Time First Assessed: 08/27/24 1703   Present on Original Admission: Yes  Wound Approximate Age at First Assessment (Weeks): 3 weeks  Primary Wound Type: Surgical Type  Location: Knee  Wound Location Orientation: Right   Wound Image     Wound Etiology Surgical   Dressing Status New dressing applied   Wound Cleansed Vashe   Dressing/Treatment Moisten with saline;Pharmaceutical agent (see MAR);Roll gauze;Ace wrap  (santyl applied)   Offloading for Diabetic Foot Ulcers Offloading not required   Dressing Change Due 09/03/24   Wound Length (cm) 0.9 cm   Wound Width (cm) 2 cm   Wound Depth (cm) 0.1 cm   Wound Surface Area (cm^2) 1.8 cm^2   Change in Wound Size % (l*w) 59.09   Wound Volume (cm^3) 0.18 cm^3   Wound Healing % 90   Post-Procedure Length (cm) 1 cm   Post-Procedure Width (cm) 2.1 cm   Post-Procedure Depth (cm) 0.2 cm   Post-Procedure Surface Area (cm^2) 2.1 cm^2   Post-Procedure Volume (cm^3) 0.42 cm^3   Wound Assessment Devitalized tissue   Drainage Amount Moderate (25-50%)   Drainage Description Serosanguinous   Odor None   Pricila-wound Assessment Edematous   Margins Defined edges   Wound Thickness Description not for Pressure Injury Full thickness   Tim Scale   Sensory Perceptions 4   Moisture 4   Activity 3   Mobility 3   Nutrition 4   Friction and Shear 3   Tim Scale Score 21   Wound Follow Up   Require Follow Up Yes

## 2024-10-15 ENCOUNTER — HOSPITAL ENCOUNTER (OUTPATIENT)
Facility: HOSPITAL | Age: 64
Discharge: HOME | End: 2024-10-15
Attending: PODIATRIST | Admitting: PODIATRIST
Payer: MEDICARE

## 2024-10-15 VITALS
OXYGEN SATURATION: 100 % | HEART RATE: 61 BPM | SYSTOLIC BLOOD PRESSURE: 150 MMHG | RESPIRATION RATE: 18 BRPM | TEMPERATURE: 98.5 F | DIASTOLIC BLOOD PRESSURE: 72 MMHG

## 2024-10-15 DIAGNOSIS — E11.622 DIABETIC ULCER OF RIGHT LOWER LEG ASSOCIATED WITH TYPE 2 DIABETES MELLITUS, WITH NECROSIS OF MUSCLE (HCC): Primary | ICD-10-CM

## 2024-10-15 DIAGNOSIS — T81.31XA WOUND DISRUPTION, POST-OP, SKIN, INITIAL ENCOUNTER: ICD-10-CM

## 2024-10-15 DIAGNOSIS — L97.913 DIABETIC ULCER OF RIGHT LOWER LEG ASSOCIATED WITH TYPE 2 DIABETES MELLITUS, WITH NECROSIS OF MUSCLE (HCC): Primary | ICD-10-CM

## 2024-10-15 PROCEDURE — 99213 OFFICE O/P EST LOW 20 MIN: CPT

## 2024-10-15 PROCEDURE — 6370000000 HC RX 637 (ALT 250 FOR IP): Performed by: PODIATRIST

## 2024-10-15 RX ORDER — LIDOCAINE HYDROCHLORIDE 20 MG/ML
JELLY TOPICAL ONCE
Status: COMPLETED | OUTPATIENT
Start: 2024-10-15 | End: 2024-10-15

## 2024-10-15 RX ORDER — CLOBETASOL PROPIONATE 0.5 MG/G
OINTMENT TOPICAL ONCE
Status: CANCELLED | OUTPATIENT
Start: 2024-10-15 | End: 2024-10-15

## 2024-10-15 RX ORDER — GINSENG 100 MG
CAPSULE ORAL ONCE
OUTPATIENT
Start: 2024-10-15 | End: 2024-10-15

## 2024-10-15 RX ORDER — CLOBETASOL PROPIONATE 0.5 MG/G
OINTMENT TOPICAL ONCE
OUTPATIENT
Start: 2024-10-15 | End: 2024-10-15

## 2024-10-15 RX ORDER — LIDOCAINE HYDROCHLORIDE 40 MG/ML
SOLUTION TOPICAL ONCE
OUTPATIENT
Start: 2024-10-15 | End: 2024-10-15

## 2024-10-15 RX ORDER — GENTAMICIN SULFATE 1 MG/G
OINTMENT TOPICAL ONCE
OUTPATIENT
Start: 2024-10-15 | End: 2024-10-15

## 2024-10-15 RX ORDER — GINSENG 100 MG
CAPSULE ORAL ONCE
Status: CANCELLED | OUTPATIENT
Start: 2024-10-15 | End: 2024-10-15

## 2024-10-15 RX ORDER — BACITRACIN ZINC AND POLYMYXIN B SULFATE 500; 1000 [USP'U]/G; [USP'U]/G
OINTMENT TOPICAL ONCE
OUTPATIENT
Start: 2024-10-15 | End: 2024-10-15

## 2024-10-15 RX ORDER — LIDOCAINE HYDROCHLORIDE 20 MG/ML
JELLY TOPICAL ONCE
OUTPATIENT
Start: 2024-10-15 | End: 2024-10-15

## 2024-10-15 RX ORDER — LIDOCAINE 50 MG/G
OINTMENT TOPICAL ONCE
Status: CANCELLED | OUTPATIENT
Start: 2024-10-15 | End: 2024-10-15

## 2024-10-15 RX ORDER — SILVER SULFADIAZINE 10 MG/G
CREAM TOPICAL ONCE
OUTPATIENT
Start: 2024-10-15 | End: 2024-10-15

## 2024-10-15 RX ORDER — TRIAMCINOLONE ACETONIDE 1 MG/G
OINTMENT TOPICAL ONCE
Status: CANCELLED | OUTPATIENT
Start: 2024-10-15 | End: 2024-10-15

## 2024-10-15 RX ORDER — LIDOCAINE 50 MG/G
OINTMENT TOPICAL ONCE
OUTPATIENT
Start: 2024-10-15 | End: 2024-10-15

## 2024-10-15 RX ORDER — MUPIROCIN 20 MG/G
OINTMENT TOPICAL ONCE
Status: CANCELLED | OUTPATIENT
Start: 2024-10-15 | End: 2024-10-15

## 2024-10-15 RX ORDER — BETAMETHASONE DIPROPIONATE 0.5 MG/G
CREAM TOPICAL ONCE
Status: CANCELLED | OUTPATIENT
Start: 2024-10-15 | End: 2024-10-15

## 2024-10-15 RX ORDER — LIDOCAINE HYDROCHLORIDE 20 MG/ML
JELLY TOPICAL ONCE
Status: CANCELLED | OUTPATIENT
Start: 2024-10-15 | End: 2024-10-15

## 2024-10-15 RX ORDER — TRIAMCINOLONE ACETONIDE 1 MG/G
OINTMENT TOPICAL ONCE
OUTPATIENT
Start: 2024-10-15 | End: 2024-10-15

## 2024-10-15 RX ORDER — NEOMYCIN/BACITRACIN/POLYMYXINB 3.5-400-5K
OINTMENT (GRAM) TOPICAL ONCE
Status: CANCELLED | OUTPATIENT
Start: 2024-10-15 | End: 2024-10-15

## 2024-10-15 RX ORDER — SODIUM CHLOR/HYPOCHLOROUS ACID 0.033 %
SOLUTION, IRRIGATION IRRIGATION ONCE
Status: CANCELLED | OUTPATIENT
Start: 2024-10-15 | End: 2024-10-15

## 2024-10-15 RX ORDER — SILVER SULFADIAZINE 10 MG/G
CREAM TOPICAL ONCE
Status: CANCELLED | OUTPATIENT
Start: 2024-10-15 | End: 2024-10-15

## 2024-10-15 RX ORDER — MUPIROCIN 20 MG/G
OINTMENT TOPICAL ONCE
OUTPATIENT
Start: 2024-10-15 | End: 2024-10-15

## 2024-10-15 RX ORDER — BACITRACIN ZINC AND POLYMYXIN B SULFATE 500; 1000 [USP'U]/G; [USP'U]/G
OINTMENT TOPICAL ONCE
Status: CANCELLED | OUTPATIENT
Start: 2024-10-15 | End: 2024-10-15

## 2024-10-15 RX ORDER — GENTAMICIN SULFATE 1 MG/G
OINTMENT TOPICAL ONCE
Status: CANCELLED | OUTPATIENT
Start: 2024-10-15 | End: 2024-10-15

## 2024-10-15 RX ORDER — LIDOCAINE 40 MG/G
CREAM TOPICAL ONCE
OUTPATIENT
Start: 2024-10-15 | End: 2024-10-15

## 2024-10-15 RX ORDER — NEOMYCIN/BACITRACIN/POLYMYXINB 3.5-400-5K
OINTMENT (GRAM) TOPICAL ONCE
OUTPATIENT
Start: 2024-10-15 | End: 2024-10-15

## 2024-10-15 RX ORDER — SODIUM CHLOR/HYPOCHLOROUS ACID 0.033 %
SOLUTION, IRRIGATION IRRIGATION ONCE
OUTPATIENT
Start: 2024-10-15 | End: 2024-10-15

## 2024-10-15 RX ORDER — LIDOCAINE HYDROCHLORIDE 40 MG/ML
SOLUTION TOPICAL ONCE
Status: CANCELLED | OUTPATIENT
Start: 2024-10-15 | End: 2024-10-15

## 2024-10-15 RX ORDER — LIDOCAINE 40 MG/G
CREAM TOPICAL ONCE
Status: CANCELLED | OUTPATIENT
Start: 2024-10-15 | End: 2024-10-15

## 2024-10-15 RX ORDER — BETAMETHASONE DIPROPIONATE 0.5 MG/G
CREAM TOPICAL ONCE
OUTPATIENT
Start: 2024-10-15 | End: 2024-10-15

## 2024-10-15 RX ADMIN — LIDOCAINE HYDROCHLORIDE: 20 JELLY TOPICAL at 10:23

## 2024-10-15 ASSESSMENT — PAIN DESCRIPTION - ORIENTATION: ORIENTATION: RIGHT

## 2024-10-15 ASSESSMENT — PAIN DESCRIPTION - LOCATION: LOCATION: KNEE

## 2024-10-15 NOTE — WOUND CARE
[facility]   Progress Note and Procedure Note   NO Procedure      Adam May  MEDICAL RECORD NUMBER:  [id number]  AGE: 64 y.o. RACE Black /   GENDER: male  : 1960  EPISODE DATE:  10/15/2024    Subjective:     No chief complaint on file.        HISTORY of PRESENT ILLNESS HPI     Adam May is a 64 y.o. male who presents today for wound/ulcer evaluation.   History of Wound Context: post op right knee wound  Wound/Ulcer Pain Timing/Severity: severe  Quality of pain: sharp, tingling, throbbing, and radiating  Severity:  10 / 10   Modifying Factors:  simple touch re producing the pain  Associated Signs/Symptoms: none    Ulcer Identification:  Ulcer Type: non-healing surgical    Contributing Factors: edema    Wound: post op wound is stable and healing distal to the wound there is a suspected suture abscess and patient is contact with his surgeon and getting an appointment to see him for addressing the stitch abscess          PAST MEDICAL HISTORY    Past Medical History:   Diagnosis Date    CAD (coronary artery disease)     Dr. Conn    Deaf, left     no hearing aids    Hyperlipidemia     Hypertension     before     Osteoarthritis     multiple joints    Wears dentures     bilateral    Wears glasses         PAST SURGICAL HISTORY    Past Surgical History:   Procedure Laterality Date    COLONOSCOPY  2018    CORONARY ANGIOPLASTY WITH STENT PLACEMENT  2017    Dr. Conn    ENDOSCOPY, COLON, DIAGNOSTIC  2018    KNEE ARTHROPLASTY Right 2018    KNEE SURGERY Right 2018    wound infection    REVISION TOTAL KNEE ARTHROPLASTY Right 2024    RIGHT TOTAL KNEE REVISION **INPATIENT STATUS DUE TO MEDICARE** performed by Siddhartha Shay MD at [facility] MAIN OR    REVISION TOTAL KNEE ARTHROPLASTY Right 2024    RIGHT KNEE IRRIGATION & DEBRIDEMENT WITH POLY SWAP  ( FLAGGED AS INPATIENT DUE TO INSURANCE) performed by Siddhartha Shay MD at [facility] MAIN OR       FAMILY  Resp. Called to switch pt to high flow.

## 2024-10-15 NOTE — FLOWSHEET NOTE
10/15/24 1453   Wound 08/27/24 Knee Right   Date First Assessed/Time First Assessed: 08/27/24 1703   Present on Original Admission: Yes  Wound Approximate Age at First Assessment (Weeks): 3 weeks  Primary Wound Type: Surgical Type  Location: Knee  Wound Location Orientation: Right   Wound Image     Wound Etiology Surgical   Dressing Status New dressing applied   Wound Cleansed Vashe   Dressing/Treatment Moisten with saline;Pharmaceutical agent (see MAR);Silicone border  (santyl applied)   Offloading for Diabetic Foot Ulcers Offloading not required   Dressing Change Due   (will call for appointment)   Wound Length (cm) 0.9 cm   Wound Width (cm) 1 cm   Wound Depth (cm) 0.2 cm   Wound Surface Area (cm^2) 0.9 cm^2   Change in Wound Size % (l*w) 79.55   Wound Volume (cm^3) 0.18 cm^3   Wound Healing % 90   Wound Assessment Devitalized tissue   Drainage Amount Moderate (25-50%)   Drainage Description Serosanguinous   Odor None   Pricila-wound Assessment Edematous   Margins Defined edges   Wound Thickness Description not for Pressure Injury Full thickness   Pain Assessment   Pain Assessment 0-10   Patient's Stated Pain Goal 9   Pain Location Knee   Pain Orientation Right     Pt complains of severe pain to knee when touched.  Patient noted to have a knot at below wound.  Pt encouraged by Dr. Banks to to follow up with Surgeon.  Pt trying to call Dr. Shay's office from the room.  Pt to call for appointment with us.

## 2024-10-16 NOTE — DISCHARGE INSTRUCTIONS
off Work:     [] May return to full duty work:                                   [] Return to work with restrictions:             Return Appointment:  [] Wound and dressing supply provider:   [] ECF or Home Healthcare:  [] Wound Assessment: [] Physician or NP scheduled for Wound Assessment:   [x] Return Appointment: With Dr. Patton  as needed.  Follow up with surgeon as soon as possible  [] Ordered tests:      Electronically signed MEGHA DEAN RN on 10/15/2024 at 12:00 PM    Wound Care Center Information: Should you experience any significant changes in your wound(s) or have questions about your wound care, please contact the [facility] at MONDAY - FRIDAY 8:00 am - 4:30.  If you need help with your wound outside these hours and cannot wait until we are again available, contact your PCP or go to the hospital emergency room.     PLEASE NOTE: IF YOU ARE UNABLE TO OBTAIN WOUND SUPPLIES, CONTINUE TO USE THE SUPPLIES YOU HAVE AVAILABLE UNTIL YOU ARE ABLE TO REACH US. IT IS MOST IMPORTANT TO KEEP THE WOUND COVERED AT ALL TIMES.     Physician Signature:_______________________    Date: ___________ Time:  ____________

## 2024-10-16 NOTE — PLAN OF CARE
Problem: Pain  Goal: Verbalizes/displays adequate comfort level or baseline comfort level  10/16/2024 1203 by Ciarra Hernandez RN  Outcome: Not Progressing  Flowsheets (Taken 10/16/2024 1203)  Verbalizes/displays adequate comfort level or baseline comfort level:   Encourage patient to monitor pain and request assistance   Assess pain using appropriate pain scale   Notify Licensed Independent Practitioner if interventions unsuccessful or patient reports new pain  Note: Pt to call and make follow  up appointment with surgeon

## 2025-02-28 NOTE — PERIOP NOTE
TRANSFER - OUT REPORT:    Verbal report given to DAVID Stokes on Adam May  being transferred to [address] [place] [address] for routine progression of patient care       Report consisted of patient's Situation, Background, Assessment and   Recommendations(SBAR).     Information from the following report(s) Adult Overview, Surgery Report, Intake/Output, and MAR was reviewed with the receiving nurse.           Lines:   Peripheral IV 07/31/24 Posterior;Right Forearm (Active)   Site Assessment Clean, dry & intact 07/31/24 1500   Line Status Infusing 07/31/24 1500   Phlebitis Assessment No symptoms 07/31/24 1500   Infiltration Assessment 0 07/31/24 1500   Dressing Status Clean, dry & intact 07/31/24 1500   Dressing Type Transparent 07/31/24 1500   Dressing Intervention New 07/31/24 1235        Opportunity for questions and clarification was provided.      Patient transported with:  Registered Nurse        Yes

## (undated) PROCEDURE — 0SPC09Z REMOVAL OF LINER FROM RIGHT KNEE JOINT, OPEN APPROACH: ICD-10-PCS

## (undated) PROCEDURE — 0SUV09Z SUPPLEMENT RIGHT KNEE JOINT, TIBIAL SURFACE WITH LINER, OPEN APPROACH: ICD-10-PCS

## (undated) DEVICE — GLOVE SURG SZ 7 L12IN FNGR THK79MIL GRN LTX FREE

## (undated) DEVICE — ADHESIVE SKIN CLOSURE WND 8.661X1.5 IN 22 CM LIQUIBAND SECUR

## (undated) DEVICE — DRAPE TWL SURG 16X26IN BLU ORB04] ALLCARE INC]

## (undated) DEVICE — SWAB CULT LIQ STUART AGR AERB MOD IN BRK SGL RAYON TIP PLAS

## (undated) DEVICE — GARMENT COMPR M FOR 13IN FT INTMIT SGL BLDR HEM FORC II

## (undated) DEVICE — GOWN,SIRUS,NONRNF,SETINSLV,XL,20/CS: Brand: MEDLINE

## (undated) DEVICE — Device

## (undated) DEVICE — DISPOSABLE TOURNIQUET CUFF SINGLE BLADDER, SINGLE PORT AND QUICK CONNECT CONNECTOR: Brand: COLOR CUFF

## (undated) DEVICE — Z DISCONTINUED USE 2860600 SUTURE VICRYL 2-0 L27IN ABSRB UD OS-6 L36MM 1/2 CIR REV CUT J533H

## (undated) DEVICE — (D)PREP SKN CHLRAPRP APPL 26ML -- CONVERT TO ITEM 371833

## (undated) DEVICE — GUIDEPIN ORTH THRD HI PERF HD SIG

## (undated) DEVICE — GLOVE ORANGE PI 8   MSG9080

## (undated) DEVICE — NEEDLE HYPO 18GA L1.5IN PNK POLYPR HUB S STL THN WALL FILL

## (undated) DEVICE — STOCKING COMPR L L29-31IN REG 19MMHG ANK 9-10IN CALF

## (undated) DEVICE — PACK PROCEDURE SURG TOT KNEE CUST

## (undated) DEVICE — APPLICATOR MEDICATED 26 CC SOLUTION HI LT ORNG CHLORAPREP

## (undated) DEVICE — SOLUTION IV 100ML 0.9% SOD CHL DIL INJ

## (undated) DEVICE — 3 BONE CEMENT MIXER: Brand: MIXEVAC

## (undated) DEVICE — GLOVE SURG SZ 7 CRM LTX FREE POLYISOPRENE POLYMER BEAD ANTI

## (undated) DEVICE — CONCISE CEMENT SCULPS KIT: Brand: CONCISE

## (undated) DEVICE — TRAY PREP DRY W/ PREM GLV 2 APPL 6 SPNG 2 UNDPD 1 OVERWRAP

## (undated) DEVICE — ADHESIVE SKIN CLOSURE 4X22 CM PREMIERPRO EXOFINFUSION DISP

## (undated) DEVICE — BIPOLAR SEALER 23-301-1 AQM MBS: Brand: AQUAMANTYS™

## (undated) DEVICE — PAD,ABDOMINAL,5"X9",STERILE,LF,1/PK: Brand: MEDLINE INDUSTRIES, INC.

## (undated) DEVICE — SOL INJ L R 1000ML BG --

## (undated) DEVICE — DEVON™ KNEE AND BODY STRAP 60" X 3" (1.5 M X 7.6 CM): Brand: DEVON

## (undated) DEVICE — HANDPIECE SET WITH HIGH FLOW TIP AND SUCTION TUBE: Brand: INTERPULSE

## (undated) DEVICE — PIN DRL QUIK HI PERF FOR SIG SYS

## (undated) DEVICE — SOL IRR L R 3000ML BG --

## (undated) DEVICE — SYR LR LCK 1ML GRAD NSAF 30ML --

## (undated) DEVICE — SINGLE PORT MANIFOLD: Brand: NEPTUNE 2

## (undated) DEVICE — SUTURE VCRL 2-0 L27IN ABSRB UD OS-6 L36MM 1/2 CIR REV CUT J533H

## (undated) DEVICE — STRYKER PERFORMANCE SERIES SAGITTAL BLADE: Brand: STRYKER PERFORMANCE SERIES

## (undated) DEVICE — SWAB CULT SGL AMIES W/O CHAR FOR THRT VAG SKIN HRT CULTSWAB

## (undated) DEVICE — HANDPIECE SET WITH FAN SPRAY TIP: Brand: INTERPULSE

## (undated) DEVICE — ZIMMER® STERILE DISPOSABLE TOURNIQUET CUFF WITH PROTECTIVE SLEEVE AND PLC, SINGLE PORT, SINGLE BLADDER, 34 IN. (86 CM)

## (undated) DEVICE — GOWN,SIRUS,NONRNF,SETINSLV,2XL,18/CS: Brand: MEDLINE

## (undated) DEVICE — SUTURE PDS + SZ 1 L96IN ABSRB VLT L65MM TP-1 1/2 CIR PDP880G

## (undated) DEVICE — (D)PACK EXTREMITY CUSTOM -- DISC BY MFR USE ITEM 338833

## (undated) DEVICE — STERILE POLYISOPRENE POWDER-FREE SURGICAL GLOVES WITH EMOLLIENT COATING: Brand: PROTEXIS

## (undated) DEVICE — CATHETERIZATION TRAY 16 FR FOL DRAINAGE BG LUBRI-SIL IC

## (undated) DEVICE — STERILE POLYISOPRENE POWDER-FREE SURGICAL GLOVES: Brand: PROTEXIS

## (undated) DEVICE — SOLUTION SCRB 4OZ 10% PVP I POVIDONE IOD TOP PAINT EXIDINE

## (undated) DEVICE — NEEDLE HYPO 18GA L1.5IN PNK POLYPR HUB S STL REG BVL STR

## (undated) DEVICE — BLADE SAW 1.27X90 MM FOR LG BNE [EZ2513PM62STE] [KOMET MEDICAL]

## (undated) DEVICE — NEEDLE HYPO 21GA L1.5IN INTRAMUSCULAR S STL LATCH BVL UP

## (undated) DEVICE — Z CONVERTED USE 2271386 BANDAGE COMPR W6INXL11YD WVN COTTON/ELASTIC CLP CLSR DBL

## (undated) DEVICE — STAPLER SKIN H3.9MM WIRE DIA0.58MM CRWN 6.9MM 35 STPL ROT

## (undated) DEVICE — SPONGE GZ W4XL4IN COT 12 PLY TYP VII WVN C FLD DSGN

## (undated) DEVICE — 4-PORT MANIFOLD: Brand: NEPTUNE 2

## (undated) DEVICE — REM POLYHESIVE ADULT PATIENT RETURN ELECTRODE: Brand: VALLEYLAB

## (undated) DEVICE — SWAB CULT LIQ STUART AGR AERB MOD IN BRK SGL RAYON TIP PLAS 220099] BECTON DICKINSON MICRO]

## (undated) DEVICE — INTENDED FOR TISSUE SEPARATION, AND OTHER PROCEDURES THAT REQUIRE A SHARP SURGICAL BLADE TO PUNCTURE OR CUT.: Brand: BARD-PARKER SAFETY BLADES SIZE 10, STERILE

## (undated) DEVICE — DRESSING,GAUZE,XEROFORM,CURAD,5"X9",ST: Brand: CURAD

## (undated) DEVICE — RESERVOIR,SUCTION,100CC,SILICONE: Brand: MEDLINE

## (undated) DEVICE — WATERPROOF, BACTERIA PROOF DRESSING WITH ABSORBENT SEE THROUGH PAD: Brand: OPSITE POST-OP VISIBLE 30X10CM CTN 20

## (undated) DEVICE — SUTURE PDS II SZ 1 L36IN ABSRB VLT L36MM CT-1 1/2 CIR Z347H

## (undated) DEVICE — DRAIN SURG 15FR SIL RND CHN W/ TRCR FULL FLUT DBL WRP TRAD

## (undated) DEVICE — ANTI-EMBOLISM STOCKINGS,THIGH LENGTH WITH BELT,X-LARGE,LONG,SIZE H+: Brand: T.E.D.

## (undated) DEVICE — IMMOBILIZER KNEE UNIV L19IN FOR 12-24IN THGH FOAM T BAR

## (undated) DEVICE — KENDALL SCD EXPRESS FOOT CUFF, MEDIUM: Brand: KENDALL SCD